# Patient Record
Sex: FEMALE | Race: WHITE | Employment: OTHER | URBAN - METROPOLITAN AREA
[De-identification: names, ages, dates, MRNs, and addresses within clinical notes are randomized per-mention and may not be internally consistent; named-entity substitution may affect disease eponyms.]

---

## 2020-08-16 ENCOUNTER — HOSPITAL ENCOUNTER (EMERGENCY)
Age: 85
Discharge: HOME | End: 2020-08-16
Attending: EMERGENCY MEDICINE
Payer: COMMERCIAL

## 2020-08-16 ENCOUNTER — APPOINTMENT (OUTPATIENT)
Dept: CT IMAGING | Age: 85
End: 2020-08-16
Payer: COMMERCIAL

## 2020-08-16 ENCOUNTER — APPOINTMENT (OUTPATIENT)
Dept: GENERAL RADIOLOGY | Age: 85
End: 2020-08-16
Payer: COMMERCIAL

## 2020-08-16 VITALS
HEART RATE: 80 BPM | WEIGHT: 138.4 LBS | DIASTOLIC BLOOD PRESSURE: 75 MMHG | SYSTOLIC BLOOD PRESSURE: 162 MMHG | RESPIRATION RATE: 18 BRPM | TEMPERATURE: 98.4 F | OXYGEN SATURATION: 94 %

## 2020-08-16 PROCEDURE — 70450 CT HEAD/BRAIN W/O DYE: CPT

## 2020-08-16 PROCEDURE — 73560 X-RAY EXAM OF KNEE 1 OR 2: CPT

## 2020-08-16 PROCEDURE — 72125 CT NECK SPINE W/O DYE: CPT

## 2020-08-16 PROCEDURE — 72128 CT CHEST SPINE W/O DYE: CPT

## 2020-08-16 PROCEDURE — 99284 EMERGENCY DEPT VISIT MOD MDM: CPT

## 2020-08-16 PROCEDURE — 73502 X-RAY EXAM HIP UNI 2-3 VIEWS: CPT

## 2020-08-16 ASSESSMENT — PAIN DESCRIPTION - PROGRESSION: CLINICAL_PROGRESSION: NOT CHANGED

## 2020-08-16 ASSESSMENT — PAIN SCALES - GENERAL: PAINLEVEL_OUTOF10: 5

## 2020-08-16 ASSESSMENT — PAIN DESCRIPTION - PAIN TYPE: TYPE: ACUTE PAIN

## 2020-08-16 ASSESSMENT — PAIN DESCRIPTION - FREQUENCY: FREQUENCY: CONTINUOUS

## 2020-08-16 ASSESSMENT — PAIN DESCRIPTION - LOCATION: LOCATION: HEAD

## 2020-08-16 ASSESSMENT — PAIN DESCRIPTION - ONSET: ONSET: GRADUAL

## 2020-08-16 ASSESSMENT — PAIN - FUNCTIONAL ASSESSMENT: PAIN_FUNCTIONAL_ASSESSMENT: ACTIVITIES ARE NOT PREVENTED

## 2020-08-16 NOTE — ED PROVIDER NOTES
[address] [facility] [place]      Pt Name: Matthew Segovia  MRN: [id number]  [place] 1935  Date of evaluation: 8/16/2020  Provider: Claudean Shell, MD    CHIEF COMPLAINT       Chief Complaint   Patient presents with   Salvador Filter Fall     pt presents to ED via [facility] from [facility] for fall today. states she started with L knee pain last night and today she was standing and her knee gave out causing her to fall. hit head, no LOC. on 81mg of ASA         HISTORY OF PRESENT ILLNESS   (Location/Symptom, Timing/Onset,Context/Setting, Quality, Duration, Modifying Factors, Severity)  Note limiting factors. Matthew Segovia is a 82 y.o. female who presents to the emergency department after a fall today. Patient reports that she was standing at her dresser and was trying to open a drawer, when she lost her balance, which she states she does frequently due to her Parkinson's disease, and was unable to stop herself from falling backwards. She states she hit her upper back and her head on the ground. She denies dizziness, chest pain, palpitations before or after the fall. She not lose consciousness. She takes a baby aspirin daily, no other blood thinners. Denies any visual changes, focal numbness, tingling, or weakness but does endorse headache. Patient is also complaining of left hip and left knee pain. She states that the left knee has known arthritis and she had worsening pain throughout the day yesterday. She states that she did not have hip pain prior to the fall. She is able to range the hip and the knee, but endorses pain with this. She denies any chest pain, abdominal pain, other extremity pain. Otherwise has been feeling well recently has no other complaints at the time of my evaluation. NursingNotes were reviewed. REVIEW OF SYSTEMS    (2-9 systems for level 4, 10 or more for level 5)       Constitutional: No fever or chills.   Eye: No visual disturbances. No eye pain. Ear/Nose/Mouth/Throat: No nasal congestion. No sore throat. Respiratory: No cough, No shortness of breath, No sputum production. Cardiovascular: No chest pain. No palpitations. Gastrointestinal: No abdominal pain. No nausea or vomiting  Genitourinary: No dysuria. No hematuria. Hematology/Lymphatics: No bleeding or bruising tendency. Immunologic: No malaise. No swollen glands. Musculoskeletal: Upper back pain. Left hip and knee pain. Integumentary: No rash. No abrasions. Neurologic: Headache. No focal numbness or weakness. PAST MEDICAL HISTORY     Past Medical History:   Diagnosis Date    High blood pressure     Parkinson disease ([facility] [address]          SURGICALHISTORY       Past Surgical History:   Procedure Laterality Date    APPENDECTOMY  1948    DILATION AND CURETTAGE OF UTERUS  1959 58 64 57    HYSTERECTOMY  1974    TONSILLECTOMY AND ADENOIDECTOMY  1943    VAGINA SURGERY  2006    repair vagina bladder and rectum         CURRENT MEDICATIONS       Discharge Medication List as of 8/16/2020  8:29 PM      CONTINUE these medications which have NOT CHANGED    Details   verapamil (CALAN-SR) 240 MG CR tablet Take 240 mg by mouth nightly. Historical Med             ALLERGIES     Estrogens conjugated; Penicillins; and Terramycin [oxytetracycline-lidocaine]    FAMILY HISTORY     History reviewed. No pertinent family history.        SOCIAL HISTORY       Social History     Socioeconomic History    Marital status:      Spouse name: None    Number of children: None    Years of education: None    Highest education level: None   Occupational History    None   Social Needs    Financial resource strain: None    Food insecurity     Worry: None     Inability: None    Transportation needs     Medical: None     Non-medical: None   Tobacco Use    Smoking status: Never Smoker   Substance and Sexual Activity    Alcohol use: Yes     Comment: occasional    Drug use: No    Sexual activity: None   Lifestyle    Physical activity     Days per week: None     Minutes per session: None    Stress: None   Relationships    Social connections     Talks on phone: None     Gets together: None     Attends Mosque service: None     Active member of club or organization: None     Attends meetings of clubs or organizations: None     Relationship status: None    Intimate partner violence     Fear of current or ex partner: None     Emotionally abused: None     Physically abused: None     Forced sexual activity: None   Other Topics Concern    None   Social History Narrative    None       SCREENINGS             PHYSICAL EXAM    (up to 7 for level 4, 8 or more for level 5)     ED Triage Vitals [08/16/20 1720]   BP Temp Temp Source Pulse Resp SpO2 Height Weight   (!) 146/70 98.4 °F (36.9 °C) Oral 80 18 94 % -- 138 lb 6.4 oz (62.8 kg)       General: Alert and oriented, No acute distress. Eye: Normal conjunctiva. Pupils equal and reactive. HENT: Oral mucosa is moist.  Occipital scalp hematoma, no lacerations. No facial bruising or abrasions. Respiratory: Lungs are clear to auscultation, Respirations are non-labored. Cardiovascular: Normal rate, Regular rhythm. No chest wall tenderness. Gastrointestinal: Soft, Non-tender, Non-distended. Musculoskeletal: Tenderness laterally over the left hip, as well as the left knee. No deformity of either joint, pain with range of motion of both joints. No other extremity tenderness, deformity, or swelling. No cervical or lumbar spinal tenderness to palpation. Patient does have mid to upper thoracic spinal tenderness. No step-off. Integumentary: Warm, Dry. No other bruising or abrasions noted. Neurologic: Alert, Oriented, No focal defects. Normal speech and cranial nerve exam.  Motor and sensation intact in all extremities. Psychiatric: Cooperative.     DIAGNOSTIC RESULTS     EKG: All EKG's are interpreted by the Emergency Department Physician who thoracic spine tenderness, no cervical spine tenderness. Normal neurologic exam.  There is a scalp hematoma to the occipital scalp. CT of the head, cervical, and thoracic spines were obtained and negative for acute traumatic injury. X-rays of the left knee show arthritic changes, but no acute osseous abnormality. X-rays of the left hip are negative. Patient was counseled regarding all findings. Recommended to take Tylenol and Motrin as needed for headache, and to follow-up closely with her PCP for possible mild concussion. Her and her son are agreeable with plan of care and she is discharged her nursing facility in stable condition. CRITICAL CARE TIME   Total Critical Care time was 0 minutes, excluding separately reportable procedures. There was a high probability of clinically significant/life threatening deterioration in the patient's condition which required my urgent intervention. CONSULTS:  None    PROCEDURES:  Unless otherwise noted below, none         FINAL IMPRESSION      1. Closed head injury, initial encounter    2.  Hematoma of scalp, initial encounter          DISPOSITION/PLAN   DISPOSITION Decision To Discharge 08/16/2020 08:05:06 PM      PATIENT REFERRED TO:  Your PCP    In 1 day        DISCHARGE MEDICATIONS:  Discharge Medication List as of 8/16/2020  8:29 PM             (Please note that portions of this note were completed with a voice recognition program.Efforts were made to edit the dictations but occasionally words are mis-transcribed.)    Simona Cadet MD (electronically signed)  Attending Emergency Physician        Simona Cadet MD  08/16/20 [id number]

## 2020-08-16 NOTE — ED NOTES
Bed: B-05  Expected date: 8/16/20  Expected time: 5:13 PM  Means of arrival:   Comments:  [place] fall/head injury/loss balance d/t knee     Manolo Colvin RN  08/16/20 2813

## 2020-08-16 NOTE — ED TRIAGE NOTES
pt presents to ED via [facility] from [address] for fall today. states she started with L knee pain last night and today she was standing and her knee gave out causing her to fall. hit head, no LOC. on 81mg of ASA. States a month ago she fell and she thinks that is how she hurt her knee. Mild edema noted. Skin warm to touch with distal pulse intact. Sensation intact    Fall risk assessment completed every shift. All precautions in place. Pt has call light within reach at all times. Room clear of clutter. Pt aware to call for assistance when getting up.

## 2020-08-17 NOTE — ED NOTES
D/C: Order noted for d/c. Pt confirmed d/c paperwork does have correct name. Discharge and education instructions reviewed with patient. Teach-back successful. Pt verbalized understanding and signed d/c papers. Pt denied questions at this time. No acute distress noted. Patient instructed to follow-up as noted - return to emergency department if symptoms worsen. Patient verbalized understanding. Discharged per EDMD with discharge instructions. Pt [facility]. Patient stable upon departure. Thanked patient for choosing [facility] for care.       Neel Rodriguez RN  08/16/20 [id number]

## 2020-12-01 RX ORDER — LISINOPRIL 5 MG/1
5 TABLET ORAL DAILY
COMMUNITY

## 2020-12-01 RX ORDER — MULTIVITAMIN
1 TABLET ORAL DAILY
Status: ON HOLD | COMMUNITY
End: 2021-02-26 | Stop reason: ALTCHOICE

## 2020-12-01 RX ORDER — MULTIVIT WITH MINERALS/LUTEIN
1000 TABLET ORAL
COMMUNITY

## 2020-12-01 RX ORDER — POLYETHYLENE GLYCOL 3350 17 G/17G
17 POWDER, FOR SOLUTION ORAL DAILY PRN
COMMUNITY

## 2020-12-01 RX ORDER — BUSPIRONE HYDROCHLORIDE 5 MG/1
7.5 TABLET ORAL NIGHTLY
Status: ON HOLD | COMMUNITY
End: 2021-02-26 | Stop reason: ALTCHOICE

## 2020-12-01 RX ORDER — FAMOTIDINE 20 MG/1
20 TABLET, FILM COATED ORAL 2 TIMES DAILY
Status: ON HOLD | COMMUNITY
End: 2021-03-05 | Stop reason: HOSPADM

## 2020-12-01 RX ORDER — CARBOXYMETHYLCELLULOSE SODIUM 5 MG/ML
1 SOLUTION/ DROPS OPHTHALMIC 4 TIMES DAILY PRN
COMMUNITY

## 2020-12-01 RX ORDER — PRAVASTATIN SODIUM 20 MG
20 TABLET ORAL NIGHTLY
COMMUNITY
Start: 2019-02-11

## 2020-12-01 RX ORDER — ASPIRIN 81 MG/1
81 TABLET ORAL DAILY
COMMUNITY

## 2020-12-01 RX ORDER — ACETAMINOPHEN 325 MG/1
650 TABLET ORAL EVERY 6 HOURS PRN
COMMUNITY

## 2020-12-01 RX ORDER — ALENDRONATE SODIUM 70 MG/1
70 TABLET ORAL
COMMUNITY
Start: 2019-03-12

## 2020-12-01 NOTE — PROGRESS NOTES
Preoperative Screening for Elective Surgery/Invasive Procedures While COVID-19 present in the community     Have you tested positive or have been told to self-isolate for COVID-19 like symptoms within the past 28 days? no   Do you currently have any of the following symptoms?no  o Fever >100.0 F or 99.9 F in immunocompromised patients? o New onset cough, shortness of breath or difficulty breathing?  o New onset sore throat, myalgia (muscle aches and pains), headache, loss of taste/smell or diarrhea?  Have you had a potential exposure to COVID-19 within the past 14 days by:no  o Close contact with a confirmed case?no  o Close contact with a healthcare worker,  or essential infrastructure worker (grocery store, [employer], gas station, public utilities or transportation)?no  o Do you reside in a congregate setting such as; skilled nursing facility, adult home, correctional facility, homeless shelter or other institutional setting?no  o Have you had recent travel to a known COVID-19 hotspot? no    Indicate if the patient has a positive screen by answering yes to one or more of the above questions. Patients who test positive or screen positive prior to surgery or on the day of surgery should be evaluated in conjunction with the surgeon/proceduralist/anesthesiologist to determine the urgency of the procedure. Remember. Caroline Langston Safety First! Call before you Fall

## 2020-12-02 ENCOUNTER — ANESTHESIA EVENT (OUTPATIENT)
Dept: OPERATING ROOM | Age: 85
End: 2020-12-02
Payer: COMMERCIAL

## 2020-12-02 NOTE — PROGRESS NOTES
Pre-operative instructions faxed with confirmation, to nursing home and [facility] aware .

## 2020-12-02 NOTE — PROGRESS NOTES
[facility] time___1230_________        Surgery time___1440_________    Take the following medications with a sip of water: Follow your MD/Surgeons pre-procedure instructions regarding your medications    Do not eat or drink anything after 12:00 midnight prior to your surgery. This includes water chewing gum, mints and ice chips. You may brush your teeth and gargle the morning of your surgery, but do not swallow the water     Please see your family doctor/pediatrician for a history and physical and/or concerning medications. Bring any test results/reports from your physicians office. If you are under the care of a heart doctor or specialist doctor, please be aware that you may be asked to them for clearance    You may be asked to stop blood thinners such as Coumadin, Plavix, Fragmin, Lovenox, etc., or any anti-inflammatories such as:  Aspirin, Ibuprofen, Advil, Naproxen prior to your surgery. We also ask that you stop any OTC medications such as fish oil, vitamin E, glucosamine, garlic, Multivitamins, COQ 10, etc.    We ask that you do not smoke 24 hours prior to surgery  We ask that you do not  drink any alcoholic beverages 24 hours prior to surgery     You must make arrangements for a responsible adult to take you home after your surgery. For your safety you will not be allowed to leave alone or drive yourself home. Your surgery will be cancelled if you do not have a ride home. Also for your safety, it is strongly suggested that someone stay with you the first 24 hours after your surgery. A parent or legal guardian must accompany a child scheduled for surgery and plan to stay at the hospital until the child is discharged. Please do not bring other children with you. For your comfort, please wear simple loose fitting clothing to the hospital.  Please do not bring valuables.     Do not wear any make-up or nail polish on your fingers or toes      For your safety, please do not wear any jewelry or body piercing's on the day of surgery. All jewelry must be removed. If you have dentures, they will be removed before going to operating room. For your convenience, we will provide you with a container. If you wear contact lenses or glasses, they will be removed, please bring a case for them. If you have a living will and a durable power of  for healthcare, please bring in a copy. As part of our patient safety program to minimize surgical site infections, we ask you to do the following:    · Please notify your surgeon if you develop any illness between         now and the  day of your surgery. · This includes a cough, cold, fever, sore throat, nausea,         or vomiting, and diarrhea, etc.  ·  Please notify your surgeon if you experience dizziness, shortness         of breath or blurred vision between now and the time of your surgery. Do not shave your operative site 96 hours prior to surgery. For face and neck surgery, men may use an electric razor 48 hours   prior to surgery. You may shower the night before surgery or the morning of   your surgery with an antibacterial soap. You will need to bring a photo ID and insurance card    [facility] has an onsite pharmacy, would you like to utilize our pharmacy     If you will be staying overnight and use a C-pap machine, please bring   your C-pap to hospital     Our goal is to provide you with excellent care, therefore, visitors will be limited to two(2) in the room at a time so that we may focus on providing this care for you. Please contact pre-admission testing if you have any further questions. [facility] phone number:  [phone] PAT fax number:  [phone]  Please note these are generalized instructions for all surgical cases, you may be provided with more specific instructions according to your surgery.

## 2020-12-03 ENCOUNTER — ANESTHESIA (OUTPATIENT)
Dept: OPERATING ROOM | Age: 85
End: 2020-12-03
Payer: COMMERCIAL

## 2020-12-03 ENCOUNTER — HOSPITAL ENCOUNTER (OUTPATIENT)
Age: 85
Setting detail: OUTPATIENT SURGERY
Discharge: HOME | End: 2020-12-03
Attending: UROLOGY | Admitting: UROLOGY
Payer: COMMERCIAL

## 2020-12-03 VITALS
TEMPERATURE: 97.3 F | DIASTOLIC BLOOD PRESSURE: 78 MMHG | RESPIRATION RATE: 16 BRPM | HEART RATE: 76 BPM | BODY MASS INDEX: 26.73 KG/M2 | SYSTOLIC BLOOD PRESSURE: 152 MMHG | WEIGHT: 132.6 LBS | HEIGHT: 59 IN | OXYGEN SATURATION: 97 %

## 2020-12-03 VITALS
OXYGEN SATURATION: 100 % | RESPIRATION RATE: 15 BRPM | SYSTOLIC BLOOD PRESSURE: 125 MMHG | DIASTOLIC BLOOD PRESSURE: 62 MMHG | TEMPERATURE: 98.6 F

## 2020-12-03 PROCEDURE — 6360000002 HC RX W HCPCS: Performed by: NURSE ANESTHETIST, CERTIFIED REGISTERED

## 2020-12-03 PROCEDURE — 7100000001 HC PACU RECOVERY - ADDTL 15 MIN: Performed by: UROLOGY

## 2020-12-03 PROCEDURE — 7100000011 HC PHASE II RECOVERY - ADDTL 15 MIN: Performed by: UROLOGY

## 2020-12-03 PROCEDURE — 2500000003 HC RX 250 WO HCPCS: Performed by: NURSE ANESTHETIST, CERTIFIED REGISTERED

## 2020-12-03 PROCEDURE — 3700000001 HC ADD 15 MINUTES (ANESTHESIA): Performed by: UROLOGY

## 2020-12-03 PROCEDURE — 3600000002 HC SURGERY LEVEL 2 BASE: Performed by: UROLOGY

## 2020-12-03 PROCEDURE — 2709999900 HC NON-CHARGEABLE SUPPLY: Performed by: UROLOGY

## 2020-12-03 PROCEDURE — 7100000010 HC PHASE II RECOVERY - FIRST 15 MIN: Performed by: UROLOGY

## 2020-12-03 PROCEDURE — 3600000012 HC SURGERY LEVEL 2 ADDTL 15MIN: Performed by: UROLOGY

## 2020-12-03 PROCEDURE — 3700000000 HC ANESTHESIA ATTENDED CARE: Performed by: UROLOGY

## 2020-12-03 PROCEDURE — 7100000000 HC PACU RECOVERY - FIRST 15 MIN: Performed by: UROLOGY

## 2020-12-03 PROCEDURE — 6360000002 HC RX W HCPCS: Performed by: UROLOGY

## 2020-12-03 PROCEDURE — 2580000003 HC RX 258: Performed by: ANESTHESIOLOGY

## 2020-12-03 RX ORDER — PROMETHAZINE HYDROCHLORIDE 25 MG/ML
6.25 INJECTION, SOLUTION INTRAMUSCULAR; INTRAVENOUS
Status: DISCONTINUED | OUTPATIENT
Start: 2020-12-03 | End: 2020-12-03 | Stop reason: HOSPADM

## 2020-12-03 RX ORDER — MEPERIDINE HYDROCHLORIDE 25 MG/ML
12.5 INJECTION INTRAMUSCULAR; INTRAVENOUS; SUBCUTANEOUS
Status: DISCONTINUED | OUTPATIENT
Start: 2020-12-03 | End: 2020-12-03 | Stop reason: HOSPADM

## 2020-12-03 RX ORDER — SODIUM CHLORIDE 0.9 % (FLUSH) 0.9 %
10 SYRINGE (ML) INJECTION PRN
Status: DISCONTINUED | OUTPATIENT
Start: 2020-12-03 | End: 2020-12-03 | Stop reason: HOSPADM

## 2020-12-03 RX ORDER — HYDROCODONE BITARTRATE AND ACETAMINOPHEN 5; 325 MG/1; MG/1
2 TABLET ORAL PRN
Status: DISCONTINUED | OUTPATIENT
Start: 2020-12-03 | End: 2020-12-03 | Stop reason: HOSPADM

## 2020-12-03 RX ORDER — PROPOFOL 10 MG/ML
INJECTION, EMULSION INTRAVENOUS PRN
Status: DISCONTINUED | OUTPATIENT
Start: 2020-12-03 | End: 2020-12-03 | Stop reason: SDUPTHER

## 2020-12-03 RX ORDER — SODIUM CHLORIDE 9 MG/ML
INJECTION, SOLUTION INTRAVENOUS CONTINUOUS
Status: DISCONTINUED | OUTPATIENT
Start: 2020-12-03 | End: 2020-12-03 | Stop reason: HOSPADM

## 2020-12-03 RX ORDER — CIPROFLOXACIN 2 MG/ML
400 INJECTION, SOLUTION INTRAVENOUS ONCE
Status: COMPLETED | OUTPATIENT
Start: 2020-12-03 | End: 2020-12-03

## 2020-12-03 RX ORDER — GLYCOPYRROLATE 0.2 MG/ML
INJECTION INTRAMUSCULAR; INTRAVENOUS PRN
Status: DISCONTINUED | OUTPATIENT
Start: 2020-12-03 | End: 2020-12-03 | Stop reason: SDUPTHER

## 2020-12-03 RX ORDER — DEXAMETHASONE SODIUM PHOSPHATE 4 MG/ML
INJECTION, SOLUTION INTRA-ARTICULAR; INTRALESIONAL; INTRAMUSCULAR; INTRAVENOUS; SOFT TISSUE PRN
Status: DISCONTINUED | OUTPATIENT
Start: 2020-12-03 | End: 2020-12-03 | Stop reason: SDUPTHER

## 2020-12-03 RX ORDER — HYDRALAZINE HYDROCHLORIDE 20 MG/ML
5 INJECTION INTRAMUSCULAR; INTRAVENOUS EVERY 10 MIN PRN
Status: DISCONTINUED | OUTPATIENT
Start: 2020-12-03 | End: 2020-12-03 | Stop reason: HOSPADM

## 2020-12-03 RX ORDER — SODIUM CHLORIDE 0.9 % (FLUSH) 0.9 %
10 SYRINGE (ML) INJECTION EVERY 12 HOURS SCHEDULED
Status: DISCONTINUED | OUTPATIENT
Start: 2020-12-03 | End: 2020-12-03 | Stop reason: HOSPADM

## 2020-12-03 RX ORDER — ONDANSETRON 2 MG/ML
4 INJECTION INTRAMUSCULAR; INTRAVENOUS
Status: DISCONTINUED | OUTPATIENT
Start: 2020-12-03 | End: 2020-12-03 | Stop reason: HOSPADM

## 2020-12-03 RX ORDER — FENTANYL CITRATE 50 UG/ML
50 INJECTION, SOLUTION INTRAMUSCULAR; INTRAVENOUS EVERY 5 MIN PRN
Status: DISCONTINUED | OUTPATIENT
Start: 2020-12-03 | End: 2020-12-03 | Stop reason: HOSPADM

## 2020-12-03 RX ORDER — FENTANYL CITRATE 50 UG/ML
25 INJECTION, SOLUTION INTRAMUSCULAR; INTRAVENOUS EVERY 5 MIN PRN
Status: DISCONTINUED | OUTPATIENT
Start: 2020-12-03 | End: 2020-12-03 | Stop reason: HOSPADM

## 2020-12-03 RX ORDER — ONDANSETRON 2 MG/ML
INJECTION INTRAMUSCULAR; INTRAVENOUS PRN
Status: DISCONTINUED | OUTPATIENT
Start: 2020-12-03 | End: 2020-12-03 | Stop reason: SDUPTHER

## 2020-12-03 RX ORDER — HYDROCODONE BITARTRATE AND ACETAMINOPHEN 5; 325 MG/1; MG/1
1 TABLET ORAL PRN
Status: DISCONTINUED | OUTPATIENT
Start: 2020-12-03 | End: 2020-12-03 | Stop reason: HOSPADM

## 2020-12-03 RX ADMIN — PROPOFOL 50 MG: 10 INJECTION, EMULSION INTRAVENOUS at 15:05

## 2020-12-03 RX ADMIN — PROPOFOL 30 MG: 10 INJECTION, EMULSION INTRAVENOUS at 15:09

## 2020-12-03 RX ADMIN — GLYCOPYRROLATE 0.2 MG: 0.2 INJECTION, SOLUTION INTRAMUSCULAR; INTRAVENOUS at 15:05

## 2020-12-03 RX ADMIN — CIPROFLOXACIN 400 MG: 2 INJECTION, SOLUTION INTRAVENOUS at 13:09

## 2020-12-03 RX ADMIN — PROPOFOL 20 MG: 10 INJECTION, EMULSION INTRAVENOUS at 15:18

## 2020-12-03 RX ADMIN — PROPOFOL 30 MG: 10 INJECTION, EMULSION INTRAVENOUS at 15:08

## 2020-12-03 RX ADMIN — PROPOFOL 70 MG: 10 INJECTION, EMULSION INTRAVENOUS at 15:02

## 2020-12-03 RX ADMIN — ONDANSETRON 4 MG: 2 INJECTION INTRAMUSCULAR; INTRAVENOUS at 15:05

## 2020-12-03 RX ADMIN — PROPOFOL 30 MG: 10 INJECTION, EMULSION INTRAVENOUS at 15:16

## 2020-12-03 RX ADMIN — SODIUM CHLORIDE: 9 INJECTION, SOLUTION INTRAVENOUS at 13:09

## 2020-12-03 RX ADMIN — DEXAMETHASONE SODIUM PHOSPHATE 8 MG: 4 INJECTION, SOLUTION INTRAMUSCULAR; INTRAVENOUS at 15:05

## 2020-12-03 RX ADMIN — PROPOFOL 50 MG: 10 INJECTION, EMULSION INTRAVENOUS at 15:13

## 2020-12-03 ASSESSMENT — PULMONARY FUNCTION TESTS
PIF_VALUE: 0
PIF_VALUE: 1
PIF_VALUE: 0

## 2020-12-03 ASSESSMENT — PAIN - FUNCTIONAL ASSESSMENT: PAIN_FUNCTIONAL_ASSESSMENT: 0-10

## 2020-12-03 ASSESSMENT — PAIN SCALES - GENERAL
PAINLEVEL_OUTOF10: 0

## 2020-12-03 ASSESSMENT — LIFESTYLE VARIABLES: SMOKING_STATUS: 0

## 2020-12-03 NOTE — DISCHARGE INSTR - ACTIVITY
Learning How To Care for Someone Who Has COVID-19  Things to know  Most people who get COVID-19 will recover with time and home care. Here are some things to know if you're caring for someone who's sick. · Treat the symptoms. Common symptoms include a fever, coughing, and feeling short of breath. Urge the person to get extra rest and drink plenty of fluids to replace fluids lost from fever. To reduce a fever, offer acetaminophen (such as Tylenol). It may also help with muscle aches. Read and follow all instructions on the label. · Watch for signs that the illness is getting worse. The person may need medical care if they're getting sicker (for example, if it's hard to breathe). But call the doctor's office before you go. They can tell you what to do. Call 911 or emergency services if the person has any of these symptoms:  ? Severe trouble breathing or shortness of breath. ? Constant pain or pressure in their chest.  ? Confusion, or trouble thinking clearly. ? A blue tint to their lips or face. Some people are more likely to get very sick and need medical care. Call the doctor as soon as symptoms start if the person you're caring for is over 72, smokes, or has a serious health problem, like asthma, heart disease, diabetes, or an immune system problem. · Protect yourself and others. The virus spreads easily from person to person, so take extra care to avoid catching or spreading the infection. ? Keep the sick person away from others as much as you can. § Have the person stay in one room. If you can, give them their own bathroom to use. § Have only one person take care of them. Keep other people--and pets--out of the sickroom. § Have the person wear a cloth face cover around other people. This includes when anyone is in the room with them or if they leave their room (for example, to go to the bathroom).  If the face cover makes it harder for the sick person to breathe, the other person should wear a face cover. § Don't share personal items. These include dishes, cups, towels, and bedding. ? Wash your hands often and well. Use soap and water, and scrub for at least 20 seconds. This is especially important after you've been around the sick person or touched things they've touched. If soap and water aren't handy, use a hand  with at least 60% alcohol. ? Avoid touching your mouth, nose, and eyes. ? Take care with the person's laundry. It's okay to wash the sick person's laundry with yours. If you have them, wear disposable gloves when handling their dirty laundry, and wash your hands well after you touch it. Wash items in the warmest water allowed for the fabric type, and dry them completely. ? Clean high-touch items every day and anytime the sick person touches them. These include doorknobs, light switches, toilets, counters, and remote controls. Use a household disinfectant or a homemade bleach solution. (Follow the directions on the label.) If the sick person has their own room, have them disinfect it every day. ? Limit visitors to your home. To help protect family and friends, stay in touch with them only by phone or computer. Current as of: July 10, 2020               Content Version: 12.6  © 2991-0782 [employer]. Care instructions adapted under license by [facility] ([facility]). If you have questions about a medical condition or this instruction, always ask your healthcare professional. [place] [address] any warranty or liability for your use of this information.

## 2020-12-03 NOTE — H&P
Update History & Physical    The patient's History and Physical was reviewed with the patient and I examined the patient. There was no change. The surgical site was confirmed by the patient and me. Plan: The risks, benefits, expected outcome, and alternative to the recommended procedure have been discussed with the patient. Patient understands and wants to proceed with the procedure.      Electronically signed by Lorene Perry MD on 12/3/2020 at 2:50 PM

## 2020-12-03 NOTE — PROGRESS NOTES
Patient admitted to PACU 13. Following commands. Denies pain. Resting comfortably. HD's stable and ventilation adequate.

## 2020-12-03 NOTE — ANESTHESIA PRE PROCEDURE
[facility] Department of Anesthesiology  Pre-Anesthesia Evaluation/Consultation       Name:  Luis Lee  : 1935  Age:  82 y.o. MRN:  [id number]  Date: 12/3/2020           Surgeon: Surgeon(s):  Asya Retana MD    Procedure: Procedure(s):  CYSTOSCOPY,  WITH INTRAVESICAL INJECTION OF BOTOX 200UNITS, WITH SUPRAPUBIC TUBE EXCHANGE     Allergies   Allergen Reactions    Estrogens Conjugated Other (See Comments)     lightheaded    Penicillins Rash    Terramycin [Oxytetracycline-Lidocaine] Rash     There is no problem list on file for this patient. Past Medical History:   Diagnosis Date    High blood pressure     Parkinson disease ([address]     Suprapubic catheter ([address]      Past Surgical History:   Procedure Laterality Date    ABDOMEN SURGERY      bowel obstruction    APPENDECTOMY      DILATION AND CURETTAGE OF UTERUS   60 62 64    HYSTERECTOMY  1974    TONSILLECTOMY AND ADENOIDECTOMY      VAGINA SURGERY  2006    repair vagina bladder and rectum     Social History     Tobacco Use    Smoking status: Never Smoker    Smokeless tobacco: Never Used   Substance Use Topics    Alcohol use: Yes     Comment: occasional    Drug use: No     Medications  No current facility-administered medications on file prior to encounter.       Current Outpatient Medications on File Prior to Encounter   Medication Sig Dispense Refill    lisinopril (PRINIVIL;ZESTRIL) 5 MG tablet Take 5 mg by mouth daily      carbidopa-levodopa (SINEMET)  MG per tablet Take 1 tablet by mouth 6 times daily Takes 50mg ER at bedtime      pravastatin (PRAVACHOL) 20 MG tablet Take 20 mg by mouth daily      alendronate (FOSAMAX) 70 MG tablet Take 70 mg by mouth every 7 days      famotidine (PEPCID) 20 MG tablet Take 20 mg by mouth 2 times daily      busPIRone (BUSPAR) 5 MG tablet Take 7.5 mg by mouth nightly      sertraline (ZOLOFT) 50 MG tablet Take 50 mg by mouth daily      vitamin D (CHOLECALCIFEROL) 25 MCG (1000 UT) TABS tablet Take 1,000 Units by mouth daily      vitamin C (ASCORBIC ACID) 500 MG tablet Take 1,000 mg by mouth Daily with lunch      polyethylene glycol (GLYCOLAX) 17 g packet Take 17 g by mouth daily      acetaminophen (TYLENOL) 325 MG tablet Take 650 mg by mouth every 6 hours as needed for Pain      carboxymethylcellulose 1 % ophthalmic solution 1 drop as needed for Dry Eyes      verapamil (CALAN-SR) 240 MG CR tablet Take 360 mg by mouth daily       Cranberry 1000 MG CAPS Take by mouth daily      Multiple Vitamin (MULTIVITAMIN) tablet Take 1 tablet by mouth daily      Probiotic Product (PROBIOTIC-10 PO) Take 1 tablet by mouth daily      MAGNESIUM PO Take 1 tablet by mouth Daily with supper      aspirin (ASPIRIN 81) 81 MG EC tablet daily Stopped for surgery       Current Facility-Administered Medications   Medication Dose Route Frequency Provider Last Rate Last Dose    ciprofloxacin (CIPRO) IVPB 400 mg  400 mg Intravenous Once Amarjit Aburto MD        0.9 % sodium chloride infusion   Intravenous Continuous Allyson Abdalla MD        sodium chloride flush 0.9 % injection 10 mL  10 mL Intravenous 2 times per day Allyson Abdalla MD        sodium chloride flush 0.9 % injection 10 mL  10 mL Intravenous PRN Allyson Abdalla MD         Vital Signs (Current)   Vitals:    12/01/20 1630   Weight: 136 lb (61.7 kg)   Height: 4' 11\" (1.499 m)                                          BP Readings from Last 3 Encounters:   08/16/20 (!) 162/75     Vital Signs Statistics (for past 48 hrs)     No data recorded  BP Readings from Last 3 Encounters:   08/16/20 (!) 162/75       BMI  Body mass index is 27.47 kg/m². Estimated body mass index is 27.47 kg/m² as calculated from the following:    Height as of this encounter: 4' 11\" (1.499 m). Weight as of this encounter: 136 lb (61.7 kg).     CBC No results found for: WBC, RBC, HGB, HCT, MCV, RDW, PLT  CMP  No results found for: NA, K, CL, CO2, BUN, CREATININE, GFRAA, AGRATIO, LABGLOM, GLUCOSE, PROT, CALCIUM, BILITOT, ALKPHOS, AST, ALT  BMP  No results found for: NA, K, CL, CO2, BUN, CREATININE, CALCIUM, GFRAA, LABGLOM, GLUCOSE  POCGlucose  No results for input(s): GLUCOSE in the last 72 hours. Coags  No results found for: PROTIME, INR, APTT  HCG (If Applicable) No results found for: PREGTESTUR, PREGSERUM, HCG, HCGQUANT   ABGs No results found for: PHART, PO2ART, IKH4NLU, MSL4PZJ, BEART, E4NWQLWY   Type & Screen (If Applicable)  No results found for: LABABO, LABRH                         BMI: Wt Readings from Last 3 Encounters:       NPO Status:  >8h                          Anesthesia Evaluation  Patient summary reviewed no history of anesthetic complications:   Airway: Mallampati: II  TM distance: >3 FB   Neck ROM: full  Mouth opening: > = 3 FB Dental: normal exam         Pulmonary: breath sounds clear to auscultation      (-) COPD, asthma, sleep apnea and not a current smoker                           Cardiovascular:    (+) hypertension:,     (-) past MI and CABG/stent      Rhythm: regular  Rate: normal                    Neuro/Psych:   (+) neuromuscular disease: Parkinson's disease,    (-) seizures, TIA and CVA           GI/Hepatic/Renal:        (-) GERD       Endo/Other:        (-) diabetes mellitus, hypothyroidism               Abdominal:           Vascular:     - DVT and PE. Anesthesia Plan      MAC     ASA 3       Induction: intravenous. Anesthetic plan and risks discussed with patient. Plan discussed with CRNA. This pre-anesthesia assessment may be used as a history and physical.    DOS STAFF ADDENDUM:    Pt seen and examined, chart reviewed (including anesthesia, drug and allergy history). No interval changes to history and physical examination.   Anesthetic plan, risks, benefits, alternatives, and personnel involved discussed with patient. Patient verbalized an understanding and agrees to proceed.       Alexandra Alex MD  December 3, 2020  12:52 PM

## 2020-12-03 NOTE — PROGRESS NOTES
Pt alert on arrival to phase II. Denies pain at present. Urge to void. Pt states \"I think I am incontinent of urine. \" VSS. IV d/c'd. Assisted pt to side of stretcher, cleaned pt, placed pull up and assisted pt to dress. Pt is sitting up in chair. Given juice and crackers. Son to room.

## 2020-12-03 NOTE — PROGRESS NOTES
Pt tolerates PO and sitting up in chair. Son present. Both state that there is no need to call report to [facility] since pt is in assisted living and takes care of herself. Son left to get the car. Wheeled pt to son's car.

## 2020-12-04 NOTE — OP NOTE
[facility]           [address]                                OPERATIVE REPORT    PATIENT NAME: Zacarias Romo                   :        1935  MED REC NO:   [id number]                          ROOM:  ACCOUNT NO:   [de-identified]                           ADMIT DATE: 2020  PROVIDER:     Karl Hilario MD    DATE OF PROCEDURE:  2020    PREOPERATIVE DIAGNOSES:  Neurogenic bladder, urgency incontinence. POSTOPERATIVE DIAGNOSES:  Neurogenic bladder, urgency incontinence. OPERATION PERFORMED:  Suprapubic catheter exchange, cystoscopy,  intravesical injection of Botox 200 units. SURGEON:  Karl Hilario MD    ANESTHESIA:  TIVA. COMPLICATIONS:  None. BLOOD LOSS:  Minimal.    INDICATIONS:  An 61-year-old female with a history of Parkinson's  disease leading to neurogenic bladder. She has a hyperactive, hypotonic  bladder. She is managed with a suprapubic catheter and Botox  injections. She is here for her regular Botox injection. OPERATIVE PROCEDURE:  The preoperative urine culture was negative. She  was given Cipro. She was taken to the operative suite. She was moved  onto the table. Anesthesia was induced. Her indwelling suprapubic  catheter was removed. A new catheter of 16-Cuban was inserted and the  balloon was filled with 10 mL of sterile water. The catheter was  clamped, and then the genitalia were prepped and draped. The 21-Cuban cystoscope advanced easily through the urethra into the  bladder. The bladder showed no specific abnormalities. Both ureteral  orifices were normal.  The suprapubic catheter was seen at the dome. 200 units of Botox were reconstituted in 20 mL of injectable saline. The injection needle was inserted and flushed. The Botox was then  injected in 1 mL increments throughout the bladder.   A total of 21 injections were given with the final being flushed. The bladder was then drained, and the scope was withdrawn. The clamp  was taken off the suprapubic catheter and the catheter plug was placed. The patient was awakened and transferred to recovery having tolerated  the procedure well. She will follow up in the office in two weeks.         Graciela Fraser MD    D: 12/03/2020 15:36:22       T: 12/03/2020 15:44:57     [id number]  Job#: [id number]     Doc#: [id number]    CC:

## 2020-12-13 ENCOUNTER — APPOINTMENT (OUTPATIENT)
Dept: CT IMAGING | Age: 85
DRG: 300 | End: 2020-12-13
Payer: COMMERCIAL

## 2020-12-13 ENCOUNTER — HOSPITAL ENCOUNTER (INPATIENT)
Age: 85
LOS: 4 days | Discharge: NURSING, REHAB OR LONG-TERM CARE | DRG: 300 | End: 2020-12-17
Attending: EMERGENCY MEDICINE | Admitting: INTERNAL MEDICINE
Payer: COMMERCIAL

## 2020-12-13 ENCOUNTER — APPOINTMENT (OUTPATIENT)
Dept: GENERAL RADIOLOGY | Age: 85
DRG: 300 | End: 2020-12-13
Payer: COMMERCIAL

## 2020-12-13 PROBLEM — I26.99 PULMONARY EMBOLISM WITHOUT ACUTE COR PULMONALE (HCC): Status: ACTIVE | Noted: 2020-12-13

## 2020-12-13 LAB
A/G RATIO: 1.1 (ref 1.1–2.2)
A/G RATIO: 1.4 (ref 1.1–2.2)
ALBUMIN SERPL-MCNC: 3.3 G/DL (ref 3.4–5)
ALBUMIN SERPL-MCNC: 3.8 G/DL (ref 3.4–5)
ALP BLD-CCNC: 125 U/L (ref 40–129)
ALP BLD-CCNC: 92 U/L (ref 40–129)
ALT SERPL-CCNC: <5 U/L (ref 10–40)
ALT SERPL-CCNC: <5 U/L (ref 10–40)
ANION GAP SERPL CALCULATED.3IONS-SCNC: 11 MMOL/L (ref 3–16)
ANION GAP SERPL CALCULATED.3IONS-SCNC: 11 MMOL/L (ref 3–16)
ANION GAP SERPL CALCULATED.3IONS-SCNC: 12 MMOL/L (ref 3–16)
APTT: 47.7 SEC (ref 24.2–36.2)
APTT: 80.8 SEC (ref 24.2–36.2)
AST SERPL-CCNC: 12 U/L (ref 15–37)
AST SERPL-CCNC: 15 U/L (ref 15–37)
BASOPHILS ABSOLUTE: 0 K/UL (ref 0–0.2)
BASOPHILS ABSOLUTE: 0.1 K/UL (ref 0–0.2)
BASOPHILS RELATIVE PERCENT: 0.5 %
BASOPHILS RELATIVE PERCENT: 0.8 %
BILIRUB SERPL-MCNC: 0.3 MG/DL (ref 0–1)
BILIRUB SERPL-MCNC: 0.3 MG/DL (ref 0–1)
BILIRUBIN URINE: NEGATIVE
BLOOD, URINE: NEGATIVE
BUN BLDV-MCNC: 11 MG/DL (ref 7–20)
BUN BLDV-MCNC: 11 MG/DL (ref 7–20)
BUN BLDV-MCNC: 16 MG/DL (ref 7–20)
CALCIUM IONIZED: 1.06 MMOL/L (ref 1.12–1.32)
CALCIUM SERPL-MCNC: 7.7 MG/DL (ref 8.3–10.6)
CALCIUM SERPL-MCNC: 8.2 MG/DL (ref 8.3–10.6)
CALCIUM SERPL-MCNC: 8.3 MG/DL (ref 8.3–10.6)
CHLORIDE BLD-SCNC: 97 MMOL/L (ref 99–110)
CHLORIDE BLD-SCNC: 97 MMOL/L (ref 99–110)
CHLORIDE BLD-SCNC: 98 MMOL/L (ref 99–110)
CLARITY: CLEAR
CO2: 23 MMOL/L (ref 21–32)
COLOR: YELLOW
CREAT SERPL-MCNC: <0.5 MG/DL (ref 0.6–1.2)
EKG ATRIAL RATE: 77 BPM
EKG DIAGNOSIS: NORMAL
EKG P AXIS: 45 DEGREES
EKG P-R INTERVAL: 174 MS
EKG Q-T INTERVAL: 396 MS
EKG QRS DURATION: 74 MS
EKG QTC CALCULATION (BAZETT): 448 MS
EKG R AXIS: -24 DEGREES
EKG T AXIS: 32 DEGREES
EKG VENTRICULAR RATE: 77 BPM
EOSINOPHILS ABSOLUTE: 0.1 K/UL (ref 0–0.6)
EOSINOPHILS ABSOLUTE: 0.1 K/UL (ref 0–0.6)
EOSINOPHILS RELATIVE PERCENT: 1 %
EOSINOPHILS RELATIVE PERCENT: 1.4 %
GFR AFRICAN AMERICAN: >60
GFR NON-AFRICAN AMERICAN: >60
GLOBULIN: 2.8 G/DL
GLOBULIN: 2.9 G/DL
GLUCOSE BLD-MCNC: 107 MG/DL (ref 70–99)
GLUCOSE BLD-MCNC: 108 MG/DL (ref 70–99)
GLUCOSE BLD-MCNC: 113 MG/DL (ref 70–99)
GLUCOSE URINE: NEGATIVE MG/DL
HCT VFR BLD CALC: 34.7 % (ref 36–48)
HCT VFR BLD CALC: 38.5 % (ref 36–48)
HEMOGLOBIN: 11.7 G/DL (ref 12–16)
HEMOGLOBIN: 12.5 G/DL (ref 12–16)
KETONES, URINE: NEGATIVE MG/DL
LEUKOCYTE ESTERASE, URINE: NEGATIVE
LYMPHOCYTES ABSOLUTE: 1.7 K/UL (ref 1–5.1)
LYMPHOCYTES ABSOLUTE: 1.8 K/UL (ref 1–5.1)
LYMPHOCYTES RELATIVE PERCENT: 22.9 %
LYMPHOCYTES RELATIVE PERCENT: 26 %
MAGNESIUM: 2.2 MG/DL (ref 1.8–2.4)
MCH RBC QN AUTO: 29.9 PG (ref 26–34)
MCH RBC QN AUTO: 30.5 PG (ref 26–34)
MCHC RBC AUTO-ENTMCNC: 32.4 G/DL (ref 31–36)
MCHC RBC AUTO-ENTMCNC: 33.7 G/DL (ref 31–36)
MCV RBC AUTO: 90.5 FL (ref 80–100)
MCV RBC AUTO: 92.4 FL (ref 80–100)
MICROSCOPIC EXAMINATION: NORMAL
MONOCYTES ABSOLUTE: 0.5 K/UL (ref 0–1.3)
MONOCYTES ABSOLUTE: 0.6 K/UL (ref 0–1.3)
MONOCYTES RELATIVE PERCENT: 7.2 %
MONOCYTES RELATIVE PERCENT: 7.8 %
NEUTROPHILS ABSOLUTE: 4.5 K/UL (ref 1.7–7.7)
NEUTROPHILS ABSOLUTE: 5.1 K/UL (ref 1.7–7.7)
NEUTROPHILS RELATIVE PERCENT: 65.3 %
NEUTROPHILS RELATIVE PERCENT: 67.1 %
NITRITE, URINE: NEGATIVE
PDW BLD-RTO: 14.6 % (ref 12.4–15.4)
PDW BLD-RTO: 14.8 % (ref 12.4–15.4)
PH UA: 7.5 (ref 5–8)
PH VENOUS: 7.51 (ref 7.35–7.45)
PLATELET # BLD: 239 K/UL (ref 135–450)
PLATELET # BLD: 248 K/UL (ref 135–450)
PMV BLD AUTO: 7 FL (ref 5–10.5)
PMV BLD AUTO: 7.4 FL (ref 5–10.5)
POTASSIUM REFLEX MAGNESIUM: 4 MMOL/L (ref 3.5–5.1)
POTASSIUM REFLEX MAGNESIUM: 4.3 MMOL/L (ref 3.5–5.1)
POTASSIUM SERPL-SCNC: 4 MMOL/L (ref 3.5–5.1)
PRO-BNP: 120 PG/ML (ref 0–449)
PRO-BNP: 170 PG/ML (ref 0–449)
PROTEIN UA: NEGATIVE MG/DL
RBC # BLD: 3.83 M/UL (ref 4–5.2)
RBC # BLD: 4.17 M/UL (ref 4–5.2)
SARS-COV-2, NAAT: NOT DETECTED
SODIUM BLD-SCNC: 131 MMOL/L (ref 136–145)
SODIUM BLD-SCNC: 131 MMOL/L (ref 136–145)
SODIUM BLD-SCNC: 133 MMOL/L (ref 136–145)
SPECIFIC GRAVITY UA: >1.03 (ref 1–1.03)
TOTAL PROTEIN: 6.2 G/DL (ref 6.4–8.2)
TOTAL PROTEIN: 6.6 G/DL (ref 6.4–8.2)
TROPONIN: <0.01 NG/ML
TROPONIN: <0.01 NG/ML
URINE REFLEX TO CULTURE: NORMAL
URINE TYPE: NORMAL
UROBILINOGEN, URINE: 0.2 E.U./DL
WBC # BLD: 6.9 K/UL (ref 4–11)
WBC # BLD: 7.6 K/UL (ref 4–11)

## 2020-12-13 PROCEDURE — 80053 COMPREHEN METABOLIC PANEL: CPT

## 2020-12-13 PROCEDURE — U0002 COVID-19 LAB TEST NON-CDC: HCPCS

## 2020-12-13 PROCEDURE — 99284 EMERGENCY DEPT VISIT MOD MDM: CPT

## 2020-12-13 PROCEDURE — 82330 ASSAY OF CALCIUM: CPT

## 2020-12-13 PROCEDURE — 36415 COLL VENOUS BLD VENIPUNCTURE: CPT

## 2020-12-13 PROCEDURE — 71045 X-RAY EXAM CHEST 1 VIEW: CPT

## 2020-12-13 PROCEDURE — 6370000000 HC RX 637 (ALT 250 FOR IP): Performed by: INTERNAL MEDICINE

## 2020-12-13 PROCEDURE — 85730 THROMBOPLASTIN TIME PARTIAL: CPT

## 2020-12-13 PROCEDURE — 93005 ELECTROCARDIOGRAM TRACING: CPT | Performed by: EMERGENCY MEDICINE

## 2020-12-13 PROCEDURE — 81003 URINALYSIS AUTO W/O SCOPE: CPT

## 2020-12-13 PROCEDURE — 2500000003 HC RX 250 WO HCPCS: Performed by: EMERGENCY MEDICINE

## 2020-12-13 PROCEDURE — 83880 ASSAY OF NATRIURETIC PEPTIDE: CPT

## 2020-12-13 PROCEDURE — 2500000003 HC RX 250 WO HCPCS: Performed by: INTERNAL MEDICINE

## 2020-12-13 PROCEDURE — 85025 COMPLETE CBC W/AUTO DIFF WBC: CPT

## 2020-12-13 PROCEDURE — 6360000004 HC RX CONTRAST MEDICATION: Performed by: EMERGENCY MEDICINE

## 2020-12-13 PROCEDURE — 83735 ASSAY OF MAGNESIUM: CPT

## 2020-12-13 PROCEDURE — 71260 CT THORAX DX C+: CPT

## 2020-12-13 PROCEDURE — 2580000003 HC RX 258: Performed by: INTERNAL MEDICINE

## 2020-12-13 PROCEDURE — 6360000002 HC RX W HCPCS: Performed by: EMERGENCY MEDICINE

## 2020-12-13 PROCEDURE — 87040 BLOOD CULTURE FOR BACTERIA: CPT

## 2020-12-13 PROCEDURE — 1200000000 HC SEMI PRIVATE

## 2020-12-13 PROCEDURE — 84484 ASSAY OF TROPONIN QUANT: CPT

## 2020-12-13 RX ORDER — LOTEPREDNOL ETABONATE 5 MG/ML
1 SUSPENSION/ DROPS OPHTHALMIC 2 TIMES DAILY PRN
COMMUNITY

## 2020-12-13 RX ORDER — ASPIRIN 81 MG/1
81 TABLET ORAL DAILY
Status: DISCONTINUED | OUTPATIENT
Start: 2020-12-13 | End: 2020-12-17 | Stop reason: HOSPADM

## 2020-12-13 RX ORDER — SODIUM CHLORIDE 0.9 % (FLUSH) 0.9 %
10 SYRINGE (ML) INJECTION PRN
Status: DISCONTINUED | OUTPATIENT
Start: 2020-12-13 | End: 2020-12-17 | Stop reason: HOSPADM

## 2020-12-13 RX ORDER — CARBIDOPA AND LEVODOPA 50; 200 MG/1; MG/1
1 TABLET, EXTENDED RELEASE ORAL NIGHTLY
Status: DISCONTINUED | OUTPATIENT
Start: 2020-12-13 | End: 2020-12-17 | Stop reason: HOSPADM

## 2020-12-13 RX ORDER — TRAMADOL HYDROCHLORIDE 50 MG/1
50 TABLET ORAL EVERY 6 HOURS PRN
Status: DISCONTINUED | OUTPATIENT
Start: 2020-12-13 | End: 2020-12-17 | Stop reason: HOSPADM

## 2020-12-13 RX ORDER — HEPARIN SODIUM 1000 [USP'U]/ML
4200 INJECTION, SOLUTION INTRAVENOUS; SUBCUTANEOUS ONCE
Status: COMPLETED | OUTPATIENT
Start: 2020-12-13 | End: 2020-12-13

## 2020-12-13 RX ORDER — HEPARIN SODIUM 1000 [USP'U]/ML
40 INJECTION, SOLUTION INTRAVENOUS; SUBCUTANEOUS PRN
Status: DISCONTINUED | OUTPATIENT
Start: 2020-12-13 | End: 2020-12-13

## 2020-12-13 RX ORDER — PRAVASTATIN SODIUM 10 MG
20 TABLET ORAL DAILY
Status: CANCELLED | OUTPATIENT
Start: 2020-12-13

## 2020-12-13 RX ORDER — ACETAMINOPHEN 650 MG/1
650 SUPPOSITORY RECTAL EVERY 4 HOURS PRN
Status: DISCONTINUED | OUTPATIENT
Start: 2020-12-13 | End: 2020-12-17 | Stop reason: HOSPADM

## 2020-12-13 RX ORDER — POLYETHYLENE GLYCOL 3350 17 G/17G
17 POWDER, FOR SOLUTION ORAL DAILY
Status: DISCONTINUED | OUTPATIENT
Start: 2020-12-13 | End: 2020-12-17 | Stop reason: HOSPADM

## 2020-12-13 RX ORDER — POLYETHYLENE GLYCOL 3350 17 G/17G
17 POWDER, FOR SOLUTION ORAL DAILY PRN
Status: DISCONTINUED | OUTPATIENT
Start: 2020-12-13 | End: 2020-12-17 | Stop reason: HOSPADM

## 2020-12-13 RX ORDER — HEPARIN SODIUM 1000 [USP'U]/ML
80 INJECTION, SOLUTION INTRAVENOUS; SUBCUTANEOUS PRN
Status: DISCONTINUED | OUTPATIENT
Start: 2020-12-13 | End: 2020-12-13

## 2020-12-13 RX ORDER — ONDANSETRON 2 MG/ML
4 INJECTION INTRAMUSCULAR; INTRAVENOUS EVERY 4 HOURS PRN
Status: DISCONTINUED | OUTPATIENT
Start: 2020-12-13 | End: 2020-12-17 | Stop reason: HOSPADM

## 2020-12-13 RX ORDER — HEPARIN SODIUM 10000 [USP'U]/100ML
8.4 INJECTION, SOLUTION INTRAVENOUS CONTINUOUS
Status: DISCONTINUED | OUTPATIENT
Start: 2020-12-13 | End: 2020-12-17 | Stop reason: HOSPADM

## 2020-12-13 RX ORDER — VERAPAMIL HYDROCHLORIDE 360 MG/1
360 CAPSULE, DELAYED RELEASE PELLETS ORAL EVERY MORNING
COMMUNITY

## 2020-12-13 RX ORDER — ACETAMINOPHEN 325 MG/1
650 TABLET ORAL EVERY 4 HOURS PRN
Status: DISCONTINUED | OUTPATIENT
Start: 2020-12-13 | End: 2020-12-17 | Stop reason: HOSPADM

## 2020-12-13 RX ORDER — FAMOTIDINE 20 MG/1
20 TABLET, FILM COATED ORAL 2 TIMES DAILY
Status: DISCONTINUED | OUTPATIENT
Start: 2020-12-13 | End: 2020-12-17 | Stop reason: HOSPADM

## 2020-12-13 RX ORDER — LISINOPRIL 5 MG/1
5 TABLET ORAL DAILY
Status: DISCONTINUED | OUTPATIENT
Start: 2020-12-13 | End: 2020-12-17 | Stop reason: HOSPADM

## 2020-12-13 RX ORDER — BUSPIRONE HYDROCHLORIDE 7.5 MG/1
7.5 TABLET ORAL NIGHTLY
Status: DISCONTINUED | OUTPATIENT
Start: 2020-12-13 | End: 2020-12-17 | Stop reason: HOSPADM

## 2020-12-13 RX ORDER — SODIUM CHLORIDE 0.9 % (FLUSH) 0.9 %
10 SYRINGE (ML) INJECTION EVERY 12 HOURS SCHEDULED
Status: DISCONTINUED | OUTPATIENT
Start: 2020-12-13 | End: 2020-12-17 | Stop reason: HOSPADM

## 2020-12-13 RX ADMIN — HEPARIN SODIUM 9.4 ML/HR: 10000 INJECTION, SOLUTION INTRAVENOUS at 03:23

## 2020-12-13 RX ADMIN — LISINOPRIL 5 MG: 5 TABLET ORAL at 08:26

## 2020-12-13 RX ADMIN — HYDROMORPHONE HYDROCHLORIDE 0.5 MG: 1 INJECTION, SOLUTION INTRAMUSCULAR; INTRAVENOUS; SUBCUTANEOUS at 03:08

## 2020-12-13 RX ADMIN — SODIUM CHLORIDE, PRESERVATIVE FREE 10 ML: 5 INJECTION INTRAVENOUS at 21:06

## 2020-12-13 RX ADMIN — TRAMADOL HYDROCHLORIDE 50 MG: 50 TABLET, FILM COATED ORAL at 11:43

## 2020-12-13 RX ADMIN — CALCIUM GLUCONATE 1 G: 20 INJECTION, SOLUTION INTRAVENOUS at 15:16

## 2020-12-13 RX ADMIN — FAMOTIDINE 20 MG: 20 TABLET, FILM COATED ORAL at 08:26

## 2020-12-13 RX ADMIN — IOPAMIDOL 75 ML: 755 INJECTION, SOLUTION INTRAVENOUS at 02:11

## 2020-12-13 RX ADMIN — CARBIDOPA AND LEVODOPA 2 TABLET: 25; 100 TABLET ORAL at 11:43

## 2020-12-13 RX ADMIN — ASPIRIN 81 MG: 81 TABLET, COATED ORAL at 08:26

## 2020-12-13 RX ADMIN — BUSPIRONE HYDROCHLORIDE 7.5 MG: 7.5 TABLET ORAL at 21:05

## 2020-12-13 RX ADMIN — VERAPAMIL HYDROCHLORIDE 360 MG: 180 TABLET, FILM COATED, EXTENDED RELEASE ORAL at 08:26

## 2020-12-13 RX ADMIN — HEPARIN SODIUM 4200 UNITS: 1000 INJECTION INTRAVENOUS; SUBCUTANEOUS at 03:24

## 2020-12-13 RX ADMIN — CARBIDOPA AND LEVODOPA 2 TABLET: 25; 100 TABLET ORAL at 08:26

## 2020-12-13 RX ADMIN — CARBIDOPA AND LEVODOPA 1 TABLET: 50; 200 TABLET, EXTENDED RELEASE ORAL at 21:13

## 2020-12-13 RX ADMIN — CARBIDOPA AND LEVODOPA 2 TABLET: 25; 100 TABLET ORAL at 15:16

## 2020-12-13 RX ADMIN — SERTRALINE HYDROCHLORIDE 50 MG: 50 TABLET ORAL at 08:26

## 2020-12-13 RX ADMIN — FAMOTIDINE 20 MG: 20 TABLET, FILM COATED ORAL at 21:05

## 2020-12-13 RX ADMIN — CARBIDOPA AND LEVODOPA 2 TABLET: 25; 100 TABLET ORAL at 17:13

## 2020-12-13 ASSESSMENT — PAIN DESCRIPTION - ORIENTATION
ORIENTATION: RIGHT
ORIENTATION: RIGHT
ORIENTATION: RIGHT;UPPER
ORIENTATION: LEFT

## 2020-12-13 ASSESSMENT — PAIN DESCRIPTION - LOCATION
LOCATION: CHEST
LOCATION: CHEST;NECK

## 2020-12-13 ASSESSMENT — PAIN SCALES - GENERAL
PAINLEVEL_OUTOF10: 10
PAINLEVEL_OUTOF10: 8
PAINLEVEL_OUTOF10: 6
PAINLEVEL_OUTOF10: 6
PAINLEVEL_OUTOF10: 8
PAINLEVEL_OUTOF10: 8

## 2020-12-13 ASSESSMENT — PAIN DESCRIPTION - FREQUENCY
FREQUENCY: CONTINUOUS

## 2020-12-13 ASSESSMENT — PAIN DESCRIPTION - DESCRIPTORS
DESCRIPTORS: SHARP
DESCRIPTORS: SHARP

## 2020-12-13 ASSESSMENT — PAIN DESCRIPTION - ONSET
ONSET: ON-GOING

## 2020-12-13 ASSESSMENT — PAIN DESCRIPTION - PROGRESSION
CLINICAL_PROGRESSION: GRADUALLY WORSENING
CLINICAL_PROGRESSION: NOT CHANGED

## 2020-12-13 ASSESSMENT — PAIN DESCRIPTION - PAIN TYPE
TYPE: ACUTE PAIN

## 2020-12-13 ASSESSMENT — PAIN - FUNCTIONAL ASSESSMENT
PAIN_FUNCTIONAL_ASSESSMENT: PREVENTS OR INTERFERES SOME ACTIVE ACTIVITIES AND ADLS
PAIN_FUNCTIONAL_ASSESSMENT: PREVENTS OR INTERFERES SOME ACTIVE ACTIVITIES AND ADLS

## 2020-12-13 NOTE — PROGRESS NOTES
Clinical Pharmacy Note  Heparin Dosing       Lab Results   Component Value Date    APTT 47.7 12/13/2020     Lab Results   Component Value Date    HGB 11.7 12/13/2020    HCT 34.7 12/13/2020     12/13/2020       Current Infusion Rate: 8.4 mL/hr    Plan:  Bolus: 000 units  Rate: 9.4 mL/hr  Next aPTT: 0100    Pharmacy will continue to monitor and adjust based on aPTT results.

## 2020-12-13 NOTE — ED NOTES
Pt daughter at bedside now and requesting to speak to MD and asking for pain medication for the patient.       Elinor Warren RN  12/13/20 6440

## 2020-12-13 NOTE — ED NOTES
ED SBAR report provider to Rajani Lopez RN. Patient to be transported to Room 4130 via stretcher by ED tech. Patient transported with bedside cardiac monitor. IV site clean, dry, and intact. MEWS score and pain assessed as 1 and documented. Updated patient and family on plan of care.      Ira Haines RN  12/13/20 7841

## 2020-12-13 NOTE — CONSULTS
Clinical Pharmacy Note  Heparin Dosing Consult    Luis Lee is a 82 y.o. female ordered heparin per high dose nomogram by Dr. Vlad Espinoza. No results found for: APTT  Lab Results   Component Value Date    HGB 12.5 12/13/2020    HCT 38.5 12/13/2020     12/13/2020       Ht Readings from Last 1 Encounters:   12/03/20 4' 11\" (1.499 m)        Wt Readings from Last 1 Encounters:   12/13/20 144 lb 10 oz (65.6 kg)     Dosing weight: 52.2 kg    Assessment/Plan:  Initial bolus: 4200 units  Initial infusion rate: 9.4 mL/hr  Next aPTT: 0930  12/13/20    Pharmacy will continue to monitor adjust heparin based on aPTT results using nomogram below:     HIGH DOSE HEPARIN PROTOCOL (DVT/PE)     Initial Bolus: 80 units/kg Max Bolus: 10,000 units       Initial Rate: 18 units/kg/hr Max Initial Rate: 2,100 units/hr     aPTT < 36   Heparin 80 units/kg bolus Increase infusion by 4 units/kg/hr       (maximum 10,000 units)   aPTT 37-48   Heparin 40 units/kg bolus Increase infusion by 2 units/kg/hr       (maximum 5,000 units)   aPTT 49-76   No bolus   No change   aPTT 77-85   No bolus   Decrease infusion by 2 units/kg/hr   aPTT 86-94   Hold heparin for 1 hour Decrease infusion by 3 units/kg/hr   aPTT      Hold heparin for 1 hour Decrease infusion by 4 units/kg/hr   aPTT > 103   Hold heparin for 1 hour Decrease infusion by 6 units/kg/hr    Obtain aPTT 6 hours after initial bolus and 6 hours after any dose change until two consecutive therapeutic aPTTs are achieved - then daily.     Sangeetha Mcneill, PharmD  12/13/2020 4:39 AM

## 2020-12-13 NOTE — PROGRESS NOTES
Pt arrived on the unit @ 5:33 am pt is alert and oriented*4, pt vs stable and does not complain of any pain, bed is locked and in the lowest position and call light within reach.

## 2020-12-13 NOTE — PROGRESS NOTES
Pt asking to walk to restroom to have a BM. Assisted pt with standing, but she was unable to maintain posture and unable to take any steps. Assisted pt to sit on edge of bed, but she was then unable to assist herself back into bed due to weakness. Called for assistance from other staff who helped reposition pt back in bed. Utilized bedpan, but pt unable to have BM.

## 2020-12-13 NOTE — CONSULTS
[address] [address]                                  CONSULTATION    PATIENT NAME: Kaitlyn Pal                   :        1935  MED REC NO:   [id number]                          ROOM:       9984  ACCOUNT NO:   [de-identified]                           ADMIT DATE: 2020  PROVIDER:     Renee Montague MD    HEMATOLOGY CONSULTATION    CONSULT DATE:  2020    REASON FOR CONSULTATION:  Pulmonary embolus. CONSULTING PROVIDER:  Amee Olvera MD    HISTORY OF PRESENT ILLNESS:  The patient is an 82-year-old female with a  history of Parkinson's disease who recently underwent a procedure for a  neurogenic bladder who presented to the hospital with a few day history  of progressive pleuritic chest pain especially on the right side. A CT  pulmonary embolus study was done that showed right-sided pulmonary  emboli. There was a small right pleural effusion. She is now on  heparin. She is feeling a bit better. She has never had a clot in the  past nor has a family history of clotting. She denies any weight loss. A few years ago, she stopped getting her cancer screenings including  mammograms and colonoscopies due to her advanced age and desire not to  treat to get them anymore. She does follow quite a bit because of her  Parkinson's disease unfortunately. PAST MEDICAL HISTORY:  1. Hypertension. 2.  Parkinson's disease. 3.  Neurogenic bladder. 4.  Hiatal hernia. PAST SURGICAL HISTORY:  1.  Status post _____  2. Status post hysterectomy. ALLERGIES:  She is allergic to NORVASC, ESTROGEN, PENICILLIN. MEDICINES:  Her medicines are BuSpar 7.5 mg p.o. daily, aspirin 81 mg  p.o. daily, Pepcid 20 mg p.o. b.i.d., Sinemet two tablets five times  daily, lisinopril 5 mg p.o. daily, Zoloft 50 mg p.o. daily, verapamil SR  360 mg p.o. daily. SOCIAL HISTORY:  She is  and lives in an assisted living at [facility]. She does not drink or smoke. She is retired. FAMILY HISTORY:  Her daughter had breast cancer and has CML. There were  no other cancers or blood clots in the family. REVIEW OF SYSTEMS:  She denies any recent fever, chills, sweats, nausea,  vomiting, abdominal pain, shortness of breath, headaches, any new bone  aches, dysphagia, odynophagia, diarrhea, constipation, hemoptysis,  hematemesis, change in vision/hearing/smell/taste, weakness, neuropathy,  skin rashes, productive cough, urinary or bowel prolapse or  incontinence, petechiae, vaginal bleeding, pruritus, hallucinations,  nasal congestion or drainage, seizures, strokes, syncope, depression,  anxiety, suicidal ideations, melena, or hematochezia. She has mild to  moderate fatigue and chest pain as above. Her 10-system review of  systems is otherwise negative. PHYSICAL EXAMINATION:  VITAL SIGNS:  She is afebrile with normal vital signs. GENERAL:  She is in no acute distress. HEENT:  Her pupils are round and reactive to light and accommodation. Extraocular muscles are intact. NECK:  She has no jugular venous distention. No thyromegaly. Oropharynx is clear. She has no carotid bruits. She has no palpable  supraclavicular or axillary lymphadenopathy. HEART:  Regular rate and rhythm. LUNGS:  Clear to auscultation bilaterally. ABDOMEN:  Nondistended, nontender with bowel sounds x4. No  hepatosplenomegaly. EXTREMITIES:  She has no peripheral clubbing, cyanosis, or edema. NEUROLOGIC EXAM:  Stable. LABORATORY DATA:  Her white blood cell count is 6.9, hemoglobin 11.7,  platelets of 366. ASSESSMENT AND PLAN:  Pulmonary embolus. This could have been provoked  by her chronic immobility. Her Parkinson's causes her to be chronically  immobile.   She does get up several times a day to go to the bathroom and do various other chores, however. I will send a partial hypercoag  workup. I do typically recommend Eliquis or Xarelto. We will need to  make sure it does not interact with any of her other medications. In  addition, her main problem is that she falls frequently. She often hits  her head. This could be a very dangerous situation on blood thinners. Therefore, we may need to consider putting in a temporary IVC filter and  not sending her out on anticoagulation. I will discuss this with the  other doctors. She has no signs or symptoms of an occult malignancy. She has no desire to go looking for that since she would not treat it  even if she found it. Thank you for the consultation. I will follow closely.         Juan Rojo MD    D: 12/13/2020 11:05:27       T: 12/13/2020 12:58:57     [id number]  Job#: [id number]     Doc#: [id number]    CC:  Negrita Estrella MD

## 2020-12-13 NOTE — PLAN OF CARE
Problem: Falls - Risk of:  Goal: Will remain free from falls  Description: Will remain free from falls  Outcome: Ongoing  Note: Pt free from injury or falls at this time, fall precautions in place, bed in low position, side rail up x2, Ware Fall Risk: Medium (25-44), bed alarm on, reoriented to room and call light, reminded not to get up without assistance, call light in reach, will continue to monitor. Pt verbalizes understanding of fall risk procedures. Goal: Absence of physical injury  Description: Absence of physical injury  Outcome: Ongoing  Note: Pt has not incurred any type of physical injury this shift. Problem: Pain:  Goal: Patient's pain/discomfort is manageable  Description: Patient's pain/discomfort is manageable  Outcome: Ongoing  Note: Pt c/o right-sided chest pain. Will monitor and medicate as needed.

## 2020-12-13 NOTE — PROGRESS NOTES
4 Eyes Skin Assessment     NAME:  Krissy Ybarra  YOB: 1935  MEDICAL RECORD NUMBER:  [id number]    The patient is being assess for  Admission    I agree that 2 RN's have performed a thorough Head to Toe Skin Assessment on the patient. ALL assessment sites listed below have been assessed. Areas assessed by both nurses:    Head, Face, Ears, Shoulders, Back, Chest, Arms, Elbows, Hands, Sacrum. Buttock, Coccyx, Ischium and Legs. Feet and Heels        Does the Patient have a Wound?  No noted wound(s)       Raheem Prevention initiated:  Yes   Wound Care Orders initiated:  No    Pressure Injury (Stage 3,4, Unstageable, DTI, NWPT, and Complex wounds) if present place consult order under [de-identified] No    New and Established Ostomies if present place consult order under : No      Nurse 1 eSignature: Electronically signed by Lynda Shi RN on 12/13/20 at 7:01 AM EST    **SHARE this note so that the co-signing nurse is able to place an eSignature**    Nurse 2 eSignature: Electronically signed by Yuriy Singh RN on 12/13/20 at 2:18 PM EST

## 2020-12-13 NOTE — ED NOTES
Pt in by squad from [facility] for chest pain. Pt resting comfortably in bed. She still complains of right sided chest pain with pain by neck as well. I spoke to daughter who called and gave her an update and told her I'd call when the covid swab was back and she was allowed to have visitors. No needs at this time. Will continue to monitor.      Andreea Swann RN  12/13/20 [id number]

## 2020-12-13 NOTE — ED NOTES
Bed: B-33  Expected date: 12/13/20  Expected time: 12:27 AM  Means of arrival: [facility]  Comments:  Chest pain     Adant Bell Doe, [address] [facility]  12/13/20 9891

## 2020-12-13 NOTE — CONSULTS
Hematology Consult    See dictation    A/P:  1. PE. She is chronically immobile due to Parkinson's. I would switch to DOAC but need to make sure it doesn't interact with her other meds. The other problem is that she falls frequently. Therefore, we may need to put temporary filter in and not anticoagulate. Will think about it for now. Continue heparin. Will send partial hypercoag workup. Thanks for consult. Will follow closely.     Nancy Sutton MD

## 2020-12-13 NOTE — PROGRESS NOTES
Clinical Pharmacy Note  Heparin Dosing       Lab Results   Component Value Date    APTT 80.8 12/13/2020     Lab Results   Component Value Date    HGB 11.7 12/13/2020    HCT 34.7 12/13/2020     12/13/2020       Current Infusion Rate: 9.4 mL/hr    Plan:  Rate: decrease to 8.4 mL/hr  Next aPTT: 12/13/20 1630    Pharmacy will continue to monitor and adjust based on aPTT results.   Becky Hardwick RP,12/13/2020,10:25 AM

## 2020-12-13 NOTE — ED PROVIDER NOTES
[facility]  eMERGENCY dEPARTMENT eNCOUnter      Pt Name: Annamaria Mariano  MRN: [id number]  [place] 1935  Date of evaluation: 12/13/2020  Provider: Rodolfo Bartlett MD  PCP: [facility]       Chief Complaint   Patient presents with    Chest Pain       HISTORY OFPRESENT ILLNESS   (Location/Symptom, Timing/Onset, Context/Setting, Quality, Duration, Modifying Factors,Severity)  Note limiting factors. Annamaria Mariano is a 82 y.o. female pain on the right side of her chest that was on and off all day then at night she is felt like she could fall asleep and she felt short of breath like she could not catch her breath she denies any fever she lives in a nursing home she says she is not been exposed to coronavirus    Nursing Notes were all reviewed and agreed with or any disagreements were addressed  in the HPI. REVIEW OF SYSTEMS    (2-9 systems for level 4, 10 or more for level 5)     Review of Systems    Positives and Pertinent negatives as per HPI. Except as noted above in the ROS, all other systems were reviewed andnegative.        PASTMEDICAL HISTORY     Past Medical History:   Diagnosis Date    Hiatal hernia     High blood pressure     Parkinson disease ([address]     Suprapubic catheter ([address]          SURGICAL HISTORY       Past Surgical History:   Procedure Laterality Date    ABDOMEN SURGERY      bowel obstruction    APPENDECTOMY  1948    CYSTOSCOPY N/A 12/3/2020    CYSTOSCOPY,  WITH INTRAVESICAL INJECTION OF BOTOX 200UNITS, WITH SUPRAPUBIC TUBE EXCHANGE performed by Jing Shook MD at [address] [facility] 60 62 63   R [facility] 56 SURGERY  2006    repair vagina bladder and rectum         CURRENT MEDICATIONS       Previous Medications    ACETAMINOPHEN (TYLENOL) 325 MG TABLET    Take 650 mg by mouth every 6 hours as needed for Pain    ALENDRONATE (FOSAMAX) 70 MG TABLET    Take 70 mg by mouth every 7 days    ASPIRIN (ASPIRIN 81) 81 MG EC TABLET    daily Stopped for surgery    BUSPIRONE (BUSPAR) 5 MG TABLET    Take 7.5 mg by mouth nightly    CARBIDOPA-LEVODOPA (SINEMET)  MG PER TABLET    Take 1 tablet by mouth 6 times daily Takes 50mg ER at bedtime    CARBOXYMETHYLCELLULOSE 1 % OPHTHALMIC SOLUTION    1 drop as needed for Dry Eyes    CRANBERRY 1000 MG CAPS    Take by mouth daily    FAMOTIDINE (PEPCID) 20 MG TABLET    Take 20 mg by mouth 2 times daily    LISINOPRIL (PRINIVIL;ZESTRIL) 5 MG TABLET    Take 5 mg by mouth daily    MAGNESIUM PO    Take 1 tablet by mouth Daily with supper    MULTIPLE VITAMIN (MULTIVITAMIN) TABLET    Take 1 tablet by mouth daily    POLYETHYLENE GLYCOL (GLYCOLAX) 17 G PACKET    Take 17 g by mouth daily    PRAVASTATIN (PRAVACHOL) 20 MG TABLET    Take 20 mg by mouth daily    PROBIOTIC PRODUCT (PROBIOTIC-10 PO)    Take 1 tablet by mouth daily    SERTRALINE (ZOLOFT) 50 MG TABLET    Take 50 mg by mouth daily    VERAPAMIL (CALAN-SR) 240 MG CR TABLET    Take 360 mg by mouth daily     VITAMIN C (ASCORBIC ACID) 500 MG TABLET    Take 1,000 mg by mouth Daily with lunch    VITAMIN D (CHOLECALCIFEROL) 25 MCG (1000 UT) TABS TABLET    Take 1,000 Units by mouth daily       ALLERGIES     Caduet [amlodipine-atorvastatin], Estrogens conjugated, Penicillins, and Terramycin [oxytetracycline-lidocaine]    FAMILY HISTORY     No family history on file.        SOCIAL HISTORY       Social History     Socioeconomic History    Marital status:      Spouse name: Not on file    Number of children: Not on file    Years of education: Not on file    Highest education level: Not on file   Occupational History    Not on file   Social Needs    Financial resource strain: Not on file    Food insecurity     Worry: Not on file     Inability: Not on file    Transportation needs     Medical: Not on file     Non-medical: Not on file Tobacco Use    Smoking status: Never Smoker    Smokeless tobacco: Never Used   Substance and Sexual Activity    Alcohol use: Yes     Comment: occasional    Drug use: No    Sexual activity: Not on file   Lifestyle    Physical activity     Days per week: Not on file     Minutes per session: Not on file    Stress: Not on file   Relationships    Social connections     Talks on phone: Not on file     Gets together: Not on file     Attends Synagogue service: Not on file     Active member of club or organization: Not on file     Attends meetings of clubs or organizations: Not on file     Relationship status: Not on file    Intimate partner violence     Fear of current or ex partner: Not on file     Emotionally abused: Not on file     Physically abused: Not on file     Forced sexual activity: Not on file   Other Topics Concern    Not on file   Social History Narrative    Not on file       SCREENINGS      @[facility]([id number])@      PHYSICAL EXAM    (up to 7 for level 4, 8 or more for level 5)     ED Triage Vitals [12/13/20 0039]   BP Temp Temp Source Pulse Resp SpO2 Height Weight   (!) 176/76 98.2 °F (36.8 °C) Oral 78 16 100 % -- 144 lb 10 oz (65.6 kg)       Physical Exam      General Appearance:  Alert, cooperative, no distress, appears stated age. Head:  Normocephalic, without obviousabnormality, atraumatic. Eyes:  conjunctiva/corneas clear, EOM's intact. Sclera anicteric. ENT: Mucous membranes moist.   Neck: Supple, symmetrical, trachea midline, no adenopathy. No jugular venous distention. Lungs:   Clear to auscultation bilaterally, respirationsunlabored. No rales, rhonchi or wheezes. Chest Wall:  No tenderness. Heart:  Regular rate and rhythm, S1 and S2 normal, no murmur, rub or gallop. Abdomen:   Soft, non-tender, bowel sounds active,   no masses, no organomegaly. Extremities: No edema, cords or calf tenderness. Full range of motion.    Pulses: 2+ and symmetric   Skin: Turgor is normal, no rashes or lesions. Neurologic: Alert and oriented X 3. No focal findings. Motor grossly normal.  Speech clear, no drift, CN III-XII grossly intact,        DIAGNOSTIC RESULTS   LABS:    Labs Reviewed   COMPREHENSIVE METABOLIC PANEL - Abnormal; Notable for the following components:       Result Value    Sodium 133 (*)     Chloride 98 (*)     Glucose 107 (*)     CREATININE <0.5 (*)     ALT <5 (*)     All other components within normal limits    Narrative:     Performed at:  [facility]  [address], [place] [address]   Phone [phone]   CULTURE, BLOOD 1   CULTURE, BLOOD 2   CBC WITH AUTO DIFFERENTIAL    Narrative:     Performed at:  [facility]  [address], [facility] [place] [address]   Phone [phone]   MAGNESIUM    Narrative:     Performed at:  [place] [address], [facility] [place] [address]   Phone [phone]   TROPONIN    Narrative:     Performed at:  [address] [facility] [place] [address]   Phone [phone]   BRAIN NATRIURETIC PEPTIDE    Narrative:     Performed at:  [facility]  [facility] [address], [facility] [place] [address]   Phone [phone]    Narrative:     Performed at:  [facility]  [facility] [address], [facility] [place] [address]   Phone [phone]   CBC WITH AUTO DIFFERENTIAL   COMPREHENSIVE METABOLIC PANEL W/ REFLEX TO MG FOR LOW K   TROPONIN   BRAIN NATRIURETIC PEPTIDE   URINE RT REFLEX TO CULTURE   COVID-19   COVID-19   COVID-19   COVID-19   CBC   APTT   APTT       All other labs were within normal range or not returned as of this dictation. EKG:  All EKG's are interpreted by the Emergency Department Physician who eithersigns or Co-signs this chart in the absence of a cardiologist.    The Ekg interpreted by me in the absence of a cardiologist shows.  Normal Sinus rhythm   Rate of   77  Axis is   Normal  QTc is  within an acceptable range  Intervals and Durations are unremarkable. Nonspecific ST-T wave changes appreciated. No evidence of acute ischemia. RADIOLOGY:   Non-plain film images such as CT, Ultrasound and MRI are read by the radiologist. Plain radiographic images are visualized by myself. *    Interpretation per the Radiologist below, if available at the time of this note:    CT CHEST PULMONARY EMBOLISM W CONTRAST   Preliminary Result   1. Right lung pulmonary emboli. 2. Small right pleural effusion with adjacent atelectasis. Findings were discussed with Dr. Philippa Holstein on 12/13/2020 at 2:35 a.m.         XR CHEST PORTABLE   Preliminary Result   1. Mild perihilar vascular congestion without overt failure. PROCEDURES   Unless otherwise noted below, none     Procedures    *    CRITICAL CARE TIME   N/A      EMERGENCY DEPARTMENT COURSE and DIFFERENTIALDIAGNOSIS/MDM:   Vitals:    Vitals:    12/13/20 0039 12/13/20 0100 12/13/20 0130 12/13/20 0145   BP: (!) 176/76 (!) 156/66 (!) 148/64 (!) 157/56   Pulse: 78 78 76 79   Resp: 16 24 24 21   Temp: 98.2 °F (36.8 °C)      TempSrc: Oral      SpO2: 100% 92% 95% 95%   Weight: 144 lb 10 oz (65.6 kg)          Patient was given thefollowing medications:  Medications   heparin (porcine) injection 5,250 Units (has no administration in time range)   heparin (porcine) injection 5,250 Units (has no administration in time range)   heparin (porcine) injection 2,620 Units (has no administration in time range)   heparin 25,000 unit in sodium chloride 0.45% 250 mL infusion (has no administration in time range)   iopamidol (ISOVUE-370) 76 % injection 75 mL (75 mLs Intravenous Given 12/13/20 0211)           The patient tolerated their visit well. The patient and / or the familywere informed of the results of any tests, a time was given to answer questions.     FINAL IMPRESSION      1. Pulmonary embolus, right [facility])          DISPOSITION/PLAN   DISPOSITION Decision To Admit 12/13/2020 02:35:34 AM      PATIENT REFERRED TO:  No follow-up provider specified.     DISCHARGE MEDICATIONS:  New Prescriptions    No medications on file       DISCONTINUED MEDICATIONS:  Discontinued Medications    No medications on file              (Please note that portions of this note were completed with a voice recognition program.  Efforts were made to edit the dictations but occasionally words are mis-transcribed.)    Rodolfo Bartlett MD (electronically signed)      Rodolfo Bartlett MD  12/13/20 7435

## 2020-12-13 NOTE — ED NOTES
Pt is resting comfortably at this time. Pain is minimal. Daughter is at bedside. Catheter emptied (700cc output). Waiting on room upstairs to get cleaned. Will continue to monitor.      Tommy Magallon RN  12/13/20 9310

## 2020-12-13 NOTE — PROGRESS NOTES
Pt sitting up in bed eating breakfast at beginning of shift. She c/o ongoing right-sided chest pain rated 6/10, described as sharp. She denies having any nausea, numbness, tingling, or SOB. LS clear. She is SR on telemetry. Suprapubic catheter in place to drainage bag with clear yellow urine present. Fall precautions in place, belongings within reach. Will continue to monitor and assess.

## 2020-12-13 NOTE — PROGRESS NOTES
Pt resting in bed in evening. She seems to be slightly confused at times, but is easily reoriented. Heparin gtt remains infusing at 8.4 mL/hr. Pt remains SR on telemetry. Will continue to monitor.

## 2020-12-13 NOTE — H&P
rectum       Medications Prior to Admission:    Prior to Admission medications    Medication Sig Start Date End Date Taking?  Authorizing Provider   carbidopa-levodopa (SINEMET)  MG per tablet Take 2 tablets by mouth 5 times daily    Yes Historical Provider, MD   verapamil (CALAN SR) 180 MG extended release tablet Take 360 mg by mouth every morning   Yes Historical Provider, MD   loteprednol (LOTEMAX) 0.5 % ophthalmic suspension Place 1 drop into both eyes 2 times daily   Yes Historical Provider, MD   lisinopril (PRINIVIL;ZESTRIL) 5 MG tablet Take 5 mg by mouth daily   Yes Historical Provider, MD   carbidopa-levodopa (SINEMET)  MG per tablet Take 2 tablets by mouth 5 times daily    Yes Historical Provider, MD   pravastatin (PRAVACHOL) 20 MG tablet Take 20 mg by mouth daily 2/11/19  Yes Historical Provider, MD   alendronate (FOSAMAX) 70 MG tablet Take 70 mg by mouth every 7 days Indications: Sundays  3/12/19  Yes Historical Provider, MD   famotidine (PEPCID) 20 MG tablet Take 20 mg by mouth 2 times daily   Yes Historical Provider, MD   busPIRone (BUSPAR) 5 MG tablet Take 7.5 mg by mouth nightly   Yes Historical Provider, MD   sertraline (ZOLOFT) 50 MG tablet Take 50 mg by mouth daily 3/11/19  Yes Historical Provider, MD   Cranberry 1000 MG CAPS Take by mouth daily   Yes Historical Provider, MD   Multiple Vitamin (MULTIVITAMIN) tablet Take 1 tablet by mouth daily   Yes Historical Provider, MD   Probiotic Product (PROBIOTIC-10 PO) Take 1 tablet by mouth daily   Yes Historical Provider, MD   vitamin D (CHOLECALCIFEROL) 25 MCG (1000 UT) TABS tablet Take 1,000 Units by mouth daily   Yes Historical Provider, MD   vitamin C (ASCORBIC ACID) 500 MG tablet Take 1,000 mg by mouth Daily with lunch   Yes Historical Provider, MD   MAGNESIUM PO Take 400 mg by mouth Daily with supper    Yes Historical Provider, MD   aspirin (ASPIRIN 81) 81 MG EC tablet Take 81 mg by mouth daily Stopped for surgery   Yes Historical Provider, MD   polyethylene glycol (GLYCOLAX) 17 g packet Take 17 g by mouth daily   Yes Historical Provider, MD   acetaminophen (TYLENOL) 325 MG tablet Take 650 mg by mouth every 6 hours as needed for Pain   Yes Historical Provider, MD   carboxymethylcellulose 1 % ophthalmic solution 1 drop as needed for Dry Eyes   Yes Historical Provider, MD       Allergies:  Caduet [amlodipine-atorvastatin], Estrogens conjugated, Penicillins, and Terramycin [oxytetracycline-lidocaine]    Social History:  The patient currently lives at [facility]:   reports that she has never smoked. She has never used smokeless tobacco.  ETOH:   reports current alcohol use. Family History:  Reviewed in detail and negative for DM, Early CAD, Cancer, CVA. Positive as follows:    No family history on file. REVIEW OF SYSTEMS:   Positive for and as noted in the HPI. All other systems reviewed and negative. PHYSICAL EXAM:    BP (!) 158/82   Pulse 81   Temp 98.2 °F (36.8 °C) (Oral)   Resp 18   Ht 4' 11\" (1.499 m)   Wt 143 lb 11.8 oz (65.2 kg)   SpO2 92%   BMI 29.03 kg/m²     General appearance: No apparent distress appears stated age and cooperative. HEENT Normal cephalic, atraumatic without obvious deformity. Pupils equal, round, and reactive to light. Extra ocular muscles intact. Conjunctivae/corneas clear. Neck: Supple, No jugular venous distention/bruits. Trachea midline without thyromegaly or adenopathy with full range of motion. Lungs: Clear to auscultation, bilaterally without Rales/Wheezes/Rhonchi with good respiratory effort. Heart: Regular rate and rhythm with Normal S1/S2 without murmurs, rubs or gallops, point of maximum impulse non-displaced  Abdomen: Soft, non-tender or non-distended without rigidity or guarding and positive bowel sounds all four quadrants. Extremities: No clubbing, cyanosis, or edema bilaterally. Full range of motion without deformity and normal gait intact.   Skin: Skin color, Hospital Problems    Diagnosis Date Noted    Pulmonary embolism without acute cor pulmonale ([address] [I26.99] 12/13/2020         PHYSICIANS CERTIFICATION:    I certify that Krissy Ybarra is expected to be hospitalized for more than 2 midnights based on the following assessment and plan:      ASSESSMENT/PLAN:      Pulmonary embolism - unclear if provoked or unprovoked. Possibly provoked by recent procedure, great immobility per patient but not clear. COVID PCR test is pending.  -on heparin gtt  -heme/onc consulted, recs appreciated  -Echocardiogram pending  -LE Dopplers pending  -no evidence of right heart strain on CTA Chest with contrast and hemodynamically stable  -rapid COVID negative, PCR test is pending  -droplet plus precautions until PCR result returns  -warfarin vs DOAC at discharge, will confer with hematology    Essential hypertension  -continue home meds    Parkinson's disease  -continue home meds    Neurogenic bladder s/p suprapubic catheter  -no active issues    DVT Prophylaxis: heparin gtt  Diet: DIET GENERAL;  Code Status: Full Code  PT/OT Eval Status: ordered    Dispo - admit med/surg tele inpatient       Evone MD Barbara    Thank you Ambika Hall for the opportunity to be involved in this patient's care. If you have any questions or concerns please feel free to contact me at [phone].

## 2020-12-13 NOTE — ED TRIAGE NOTES
Patient came in from [facility] via EMS complaining of chest pain x1 day. States her chest started hurting in the morning and progressed throughout the da. Pain is 8/10 under her left breast and increases with inspiration. States she has bladder surgery last week. A&O x4.

## 2020-12-14 LAB
ANION GAP SERPL CALCULATED.3IONS-SCNC: 8 MMOL/L (ref 3–16)
APTT: 42.1 SEC (ref 24.2–36.2)
APTT: 55.4 SEC (ref 24.2–36.2)
APTT: 80.1 SEC (ref 24.2–36.2)
BASOPHILS ABSOLUTE: 0.1 K/UL (ref 0–0.2)
BASOPHILS RELATIVE PERCENT: 1 %
BUN BLDV-MCNC: 8 MG/DL (ref 7–20)
CALCIUM SERPL-MCNC: 8.4 MG/DL (ref 8.3–10.6)
CHLORIDE BLD-SCNC: 102 MMOL/L (ref 99–110)
CO2: 27 MMOL/L (ref 21–32)
CREAT SERPL-MCNC: <0.5 MG/DL (ref 0.6–1.2)
EOSINOPHILS ABSOLUTE: 0.1 K/UL (ref 0–0.6)
EOSINOPHILS RELATIVE PERCENT: 2.3 %
GFR AFRICAN AMERICAN: >60
GFR NON-AFRICAN AMERICAN: >60
GLUCOSE BLD-MCNC: 112 MG/DL (ref 70–99)
HCT VFR BLD CALC: 36.5 % (ref 36–48)
HEMOGLOBIN: 12.3 G/DL (ref 12–16)
LV EF: 60 %
LVEF MODALITY: NORMAL
LYMPHOCYTES ABSOLUTE: 1.4 K/UL (ref 1–5.1)
LYMPHOCYTES RELATIVE PERCENT: 23.6 %
MCH RBC QN AUTO: 30.2 PG (ref 26–34)
MCHC RBC AUTO-ENTMCNC: 33.6 G/DL (ref 31–36)
MCV RBC AUTO: 89.8 FL (ref 80–100)
MONOCYTES ABSOLUTE: 0.5 K/UL (ref 0–1.3)
MONOCYTES RELATIVE PERCENT: 7.8 %
NEUTROPHILS ABSOLUTE: 3.8 K/UL (ref 1.7–7.7)
NEUTROPHILS RELATIVE PERCENT: 65.3 %
PDW BLD-RTO: 14.6 % (ref 12.4–15.4)
PLATELET # BLD: 232 K/UL (ref 135–450)
PMV BLD AUTO: 6.9 FL (ref 5–10.5)
POTASSIUM REFLEX MAGNESIUM: 3.9 MMOL/L (ref 3.5–5.1)
RBC # BLD: 4.07 M/UL (ref 4–5.2)
SODIUM BLD-SCNC: 137 MMOL/L (ref 136–145)
WBC # BLD: 5.8 K/UL (ref 4–11)

## 2020-12-14 PROCEDURE — 93970 EXTREMITY STUDY: CPT

## 2020-12-14 PROCEDURE — 86146 BETA-2 GLYCOPROTEIN ANTIBODY: CPT

## 2020-12-14 PROCEDURE — 85025 COMPLETE CBC W/AUTO DIFF WBC: CPT

## 2020-12-14 PROCEDURE — 2500000003 HC RX 250 WO HCPCS: Performed by: INTERNAL MEDICINE

## 2020-12-14 PROCEDURE — 85730 THROMBOPLASTIN TIME PARTIAL: CPT

## 2020-12-14 PROCEDURE — 97116 GAIT TRAINING THERAPY: CPT

## 2020-12-14 PROCEDURE — 81241 F5 GENE: CPT

## 2020-12-14 PROCEDURE — 36415 COLL VENOUS BLD VENIPUNCTURE: CPT

## 2020-12-14 PROCEDURE — 1200000000 HC SEMI PRIVATE

## 2020-12-14 PROCEDURE — 86147 CARDIOLIPIN ANTIBODY EA IG: CPT

## 2020-12-14 PROCEDURE — 85613 RUSSELL VIPER VENOM DILUTED: CPT

## 2020-12-14 PROCEDURE — 97535 SELF CARE MNGMENT TRAINING: CPT

## 2020-12-14 PROCEDURE — 6360000002 HC RX W HCPCS: Performed by: FAMILY MEDICINE

## 2020-12-14 PROCEDURE — 2580000003 HC RX 258: Performed by: INTERNAL MEDICINE

## 2020-12-14 PROCEDURE — 97166 OT EVAL MOD COMPLEX 45 MIN: CPT

## 2020-12-14 PROCEDURE — 80048 BASIC METABOLIC PNL TOTAL CA: CPT

## 2020-12-14 PROCEDURE — 97530 THERAPEUTIC ACTIVITIES: CPT

## 2020-12-14 PROCEDURE — 97162 PT EVAL MOD COMPLEX 30 MIN: CPT

## 2020-12-14 PROCEDURE — 81240 F2 GENE: CPT

## 2020-12-14 PROCEDURE — 93306 TTE W/DOPPLER COMPLETE: CPT

## 2020-12-14 PROCEDURE — 6370000000 HC RX 637 (ALT 250 FOR IP): Performed by: INTERNAL MEDICINE

## 2020-12-14 PROCEDURE — 99221 1ST HOSP IP/OBS SF/LOW 40: CPT | Performed by: STUDENT IN AN ORGANIZED HEALTH CARE EDUCATION/TRAINING PROGRAM

## 2020-12-14 PROCEDURE — 85610 PROTHROMBIN TIME: CPT

## 2020-12-14 RX ORDER — HEPARIN SODIUM 1000 [USP'U]/ML
2100 INJECTION, SOLUTION INTRAVENOUS; SUBCUTANEOUS ONCE
Status: COMPLETED | OUTPATIENT
Start: 2020-12-14 | End: 2020-12-14

## 2020-12-14 RX ADMIN — SODIUM CHLORIDE, PRESERVATIVE FREE 10 ML: 5 INJECTION INTRAVENOUS at 21:31

## 2020-12-14 RX ADMIN — CARBIDOPA AND LEVODOPA 2 TABLET: 25; 100 TABLET ORAL at 10:44

## 2020-12-14 RX ADMIN — SODIUM CHLORIDE, PRESERVATIVE FREE 10 ML: 5 INJECTION INTRAVENOUS at 10:43

## 2020-12-14 RX ADMIN — HEPARIN SODIUM 9.4 ML/HR: 10000 INJECTION, SOLUTION INTRAVENOUS at 08:10

## 2020-12-14 RX ADMIN — BUSPIRONE HYDROCHLORIDE 7.5 MG: 7.5 TABLET ORAL at 21:02

## 2020-12-14 RX ADMIN — LISINOPRIL 5 MG: 5 TABLET ORAL at 10:48

## 2020-12-14 RX ADMIN — FAMOTIDINE 20 MG: 20 TABLET, FILM COATED ORAL at 21:02

## 2020-12-14 RX ADMIN — CARBIDOPA AND LEVODOPA 1 TABLET: 50; 200 TABLET, EXTENDED RELEASE ORAL at 21:30

## 2020-12-14 RX ADMIN — CARBIDOPA AND LEVODOPA 2 TABLET: 25; 100 TABLET ORAL at 12:47

## 2020-12-14 RX ADMIN — CARBIDOPA AND LEVODOPA 2 TABLET: 25; 100 TABLET ORAL at 17:43

## 2020-12-14 RX ADMIN — ASPIRIN 81 MG: 81 TABLET, COATED ORAL at 10:44

## 2020-12-14 RX ADMIN — FAMOTIDINE 20 MG: 20 TABLET, FILM COATED ORAL at 10:43

## 2020-12-14 RX ADMIN — CARBIDOPA AND LEVODOPA 2 TABLET: 25; 100 TABLET ORAL at 06:51

## 2020-12-14 RX ADMIN — CARBIDOPA AND LEVODOPA 2 TABLET: 25; 100 TABLET ORAL at 15:06

## 2020-12-14 RX ADMIN — HEPARIN SODIUM 2100 UNITS: 1000 INJECTION INTRAVENOUS; SUBCUTANEOUS at 12:47

## 2020-12-14 RX ADMIN — VERAPAMIL HYDROCHLORIDE 360 MG: 180 TABLET, FILM COATED, EXTENDED RELEASE ORAL at 10:48

## 2020-12-14 RX ADMIN — SERTRALINE HYDROCHLORIDE 50 MG: 50 TABLET ORAL at 10:44

## 2020-12-14 RX ADMIN — POLYETHYLENE GLYCOL 3350 17 G: 17 POWDER, FOR SOLUTION ORAL at 10:43

## 2020-12-14 ASSESSMENT — PAIN SCALES - GENERAL: PAINLEVEL_OUTOF10: 0

## 2020-12-14 ASSESSMENT — ENCOUNTER SYMPTOMS
BACK PAIN: 0
ABDOMINAL PAIN: 0
COUGH: 0
SHORTNESS OF BREATH: 1

## 2020-12-14 NOTE — PROGRESS NOTES
Clinical Pharmacy Note  Heparin Dosing       Lab Results   Component Value Date    APTT 55.4 12/14/2020     Lab Results   Component Value Date    HGB 11.7 12/13/2020    HCT 34.7 12/13/2020     12/13/2020       Current Infusion Rate: 8.4 mL/hr    Plan:  Rate: Continue 9.4 mL/hr  Next aPTT: 0800  12/14/20    Pharmacy will continue to monitor and adjust based on aPTT results.     Rossi Ervin PharmD  12/14/2020 5:13 AM

## 2020-12-14 NOTE — CARE COORDINATION
12/14 Patient is from [address] [place] [address]. Plan is to return to facility. Electronically signed by Galina Traylor RN on 12/14/2020 at 2:29 PM

## 2020-12-14 NOTE — CONSULTS
[facility]  Consultation Note    Chief Complaint / Reason for Consultation  Pulmonary Embolism, contraindication to anticoagulation    History of Present Illness  Patient is a 82 y.o. female presenting with acute right sided PE. The patient states that since Saturday evening into Sunday she has had intermittent shortness of breath and right sided pleuritic chest pain. She denies any history of clotting disorders nor family history. She does have a history of Parkinson's disease and apparently this limits her mobility and makes her prone to falls and accidents. She denies any fever or chills. She was tested for COVID which was negative. She denies any pain in her legs. She does have some edema. She has a suprapubic catheter in place that was recently replaced due to neurogenic bladder. Overall she is feeling better than prior to admission secondary to heparin initiation. Review of Systems  Review of Systems   Constitutional: Negative for activity change, chills and fever. HENT: Negative for congestion. Eyes: Negative for visual disturbance. Respiratory: Positive for shortness of breath. Negative for cough. Cardiovascular: Positive for chest pain and leg swelling. Negative for palpitations. Gastrointestinal: Negative for abdominal pain. Genitourinary: Positive for difficulty urinating. Musculoskeletal: Negative for back pain. Skin: Negative for wound. Neurological: Positive for tremors and weakness. Hematological: Does not bruise/bleed easily. Psychiatric/Behavioral: Negative for agitation and behavioral problems.         Past Medical History:   Diagnosis Date    Hiatal hernia     High blood pressure     Parkinson disease [address] [address]     Suprapubic catheter [facility])        Past Surgical History:   Procedure Laterality Date    ABDOMEN SURGERY      bowel obstruction    APPENDECTOMY  1948    CYSTOSCOPY N/A 12/3/2020    CYSTOSCOPY,  WITH INTRAVESICAL INJECTION OF BOTOX 200UNITS, WITH SUPRAPUBIC TUBE EXCHANGE performed by Bonnie Arce MD at [address] 62 63   R [facility] 56 SURGERY  2006    repair vagina bladder and rectum       Allergies   Allergen Reactions    Caduet [Amlodipine-Atorvastatin]     Estrogens Conjugated Other (See Comments)     lightheaded    Penicillins Rash    Terramycin [Oxytetracycline-Lidocaine] Rash       Social History     Socioeconomic History    Marital status:      Spouse name: Not on file    Number of children: Not on file    Years of education: Not on file    Highest education level: Not on file   Occupational History    Not on file   Social Needs    Financial resource strain: Not on file    Food insecurity     Worry: Not on file     Inability: Not on file    Transportation needs     Medical: Not on file     Non-medical: Not on file   Tobacco Use    Smoking status: Never Smoker    Smokeless tobacco: Never Used   Substance and Sexual Activity    Alcohol use: Yes     Comment: occasional    Drug use: No    Sexual activity: Not on file   Lifestyle    Physical activity     Days per week: Not on file     Minutes per session: Not on file    Stress: Not on file   Relationships    Social connections     Talks on phone: Not on file     Gets together: Not on file     Attends Church service: Not on file     Active member of club or organization: Not on file     Attends meetings of clubs or organizations: Not on file     Relationship status: Not on file    Intimate partner violence     Fear of current or ex partner: Not on file     Emotionally abused: Not on file     Physically abused: Not on file     Forced sexual activity: Not on file   Other Topics Concern    Not on file   Social History Narrative    Not on file       No family history on file.   - No history of bleeding or clotting disorders    Vital Signs  Vitals: 12/13/20 2004 12/14/20 0023 12/14/20 0422 12/14/20 1047   BP: 112/68 (!) 145/69 (!) 182/89 (!) 171/77   Pulse: 76 77 75 70   Resp: 18 18 18 17   Temp: 97.4 °F (36.3 °C) 98.2 °F (36.8 °C) 98 °F (36.7 °C) 97.9 °F (36.6 °C)   TempSrc: Oral Oral Oral Oral   SpO2: 92% 92% 95% 98%   Weight:   143 lb 8.3 oz (65.1 kg)    Height:           Physical Examination  General: No acute distress, on nasal cannula, conversant  Psychiatric: affect appropriate  Head/Eyes/Ears/Nose/Throat:  Atraumatic, vision and hearing intact, face symmetric  Neck:  supple  Chest/Lungs: short shallow breaths  Cardiac:  Regular rate and rhythm  Abdomen: soft, nontender  Extremities: warm and well perfused, mild edema-pitting  - bilateral upper extremity motorsensory intact  - bilateral lower extremity motorsensory intact  Vascular exam:  - R femoral: palp  - L femoral: palp  Skin: no wounds present    Labs  Lab Results   Component Value Date    WBC 5.8 12/14/2020    HGB 12.3 12/14/2020    HCT 36.5 12/14/2020    MCV 89.8 12/14/2020     12/14/2020     Lab Results   Component Value Date     12/14/2020    K 3.9 12/14/2020     12/14/2020    CO2 27 12/14/2020    BUN 8 12/14/2020    CREATININE <0.5 12/14/2020      No components found for: GLU    Imaging: CTA-PE  1. Right lung pulmonary emboli. 2. Small right pleural effusion with adjacent atelectasis. Assessment:   PE with concerns about anticoagulation      Plan: This is an 82year old female patient who presents with a right sided symptomatic PE. Due to her propensity for falls there is concern about full anticoagulation and its potential detrimental effect should she ever fall and hit her head. Would follow the results of the echocardiogram first and foremost and see how her heart is responding to the PE given that it is fairly sizeable.  If her right heart is strained then it might be worth considering pulmonary thrombectomy or EKOS to debulk the thrombus and then place filter

## 2020-12-14 NOTE — FLOWSHEET NOTE
Pt a&o x4 and follows commands. Pt ambulated to transport bed x2 with walker. Pt tolerated well. Pt expresses no needs at this time. Pt going down to vascular.  Electronically signed by Goldie Hassan RN on 12/14/2020 at 1:15 PM

## 2020-12-14 NOTE — PROGRESS NOTES
Hematology Oncology Daily Progress Note    Admit Date: 12/13/2020  Hospital day several    Subjective:     Patient has complaints of improved ADAIR/CP. Medication side effects: none    Scheduled Meds:   verapamil  360 mg Oral Daily    sertraline  50 mg Oral Daily    polyethylene glycol  17 g Oral Daily    lisinopril  5 mg Oral Daily    famotidine  20 mg Oral BID    busPIRone  7.5 mg Oral Nightly    aspirin  81 mg Oral Daily    sodium chloride flush  10 mL Intravenous 2 times per day    carbidopa-levodopa  1 tablet Oral Nightly    carbidopa-levodopa  2 tablet Oral 5x Daily     Continuous Infusions:   heparin (PORCINE) Infusion 9.4 mL/hr (12/14/20 0810)     PRN Meds:dextran 70-hypromellose, sodium chloride flush, ondansetron, polyethylene glycol, acetaminophen **OR** acetaminophen, carbidopa-levodopa, traMADol    Review of Systems  Pertinent items are noted in HPI. REVIEW OF SYSTEMS:         · Constitutional: Denies fever, sweats, weight loss     · Eyes: No visual changes or diplopia. No scleral icterus. · ENT: No Headaches, hearing loss or vertigo. No mouth sores or sore throat. · Cardiovascular: No chest pain, dyspnea on exertion, palpitations or loss of consciousness. · Respiratory: No cough or wheezing, no sputum production. No hemoptysis. .    · Gastrointestinal: No abdominal pain, appetite loss, blood in stools. No change in bowel habits. · Genitourinary: No dysuria, trouble voiding, or hematuria. · Musculoskeletal:  Generalized weakness. No joint complaints. · Integumentary: No rash or pruritis. · Neurological: No headache, diplopia. No change in gait, balance, or coordination. No paresthesias. · Endocrine: No temperature intolerance. No excessive thirst, fluid intake, or urination. · Hematologic/Lymphatic: No abnormal bruising or ecchymoses, blood clots or swollen lymph nodes. · Allergic/Immunologic: No nasal congestion or hives.    ·     Objective:     Patient Vitals for the past 8 hrs:   BP Temp Temp src Pulse Resp SpO2 Weight   12/14/20 0422 (!) 182/89 98 °F (36.7 °C) Oral 75 18 95 % 143 lb 8.3 oz (65.1 kg)     I/O last 3 completed shifts: In: 842.9 [P.O.:720; I.V.:122.9]  Out: 2450 [Urine:2450]  No intake/output data recorded. BP (!) 182/89   Pulse 75   Temp 98 °F (36.7 °C) (Oral)   Resp 18   Ht 4' 11\" (1.499 m)   Wt 143 lb 8.3 oz (65.1 kg)   SpO2 95%   BMI 28.99 kg/m²     General Appearance:    Alert, cooperative, no distress, appears stated age   Head:    Normocephalic, without obvious abnormality, atraumatic   Eyes:    PERRL, conjunctiva/corneas clear, EOM's intact, fundi     benign, both eyes        Ears:    Normal TM's and external ear canals, both ears   Nose:   Nares normal, septum midline, mucosa normal, no drainage    or sinus tenderness   Throat:   Lips, mucosa, and tongue normal; teeth and gums normal   Neck:   Supple, symmetrical, trachea midline, no adenopathy;        thyroid:  No enlargement/tenderness/nodules; no carotid    bruit or JVD   Back:     Symmetric, no curvature, ROM normal, no CVA tenderness   Lungs:     Clear to auscultation bilaterally, respirations unlabored   Chest wall:    No tenderness or deformity   Heart:    Regular rate and rhythm, S1 and S2 normal, no murmur, rub   or gallop   Abdomen:     Soft, non-tender, bowel sounds active all four quadrants,     no masses, no organomegaly           Extremities:   Extremities normal, atraumatic, no cyanosis or edema   Pulses:   2+ and symmetric all extremities   Skin:   Skin color, texture, turgor normal, no rashes or lesions   Lymph nodes:   Cervical, supraclavicular, and axillary nodes normal   Neurologic:   Stable         Data Review  CBC:   Lab Results   Component Value Date    WBC 5.8 12/14/2020    RBC 4.07 12/14/2020       Assessment:     Active Problems:    Pulmonary embolism without acute cor pulmonale (HCC)  Resolved Problems:    * No resolved hospital problems. *      Plan:     1.   Pulmonary embolus. This is likely provoked by her chronic immobility. I did send a hypercoag work-up. Typically I would send her out on Eliquis or Xarelto but she has frequent falls due to her Parkinson's. She frequently hits her head. Therefore, she will likely need a few more days of intravenous anticoagulation and an IVC filter placed.   Perhaps vascular surgery can weigh in.        Electronically signed by Kacy Kidd MD on 12/14/2020 at 9:44 AM

## 2020-12-14 NOTE — PROGRESS NOTES
Hospitalist Progress Note      PCP: Orquidea Liz    Date of Admission: 12/13/2020    Chief Complaint: Acute PE    Hospital Course:     Subjective: No chest pain or shortness of breath. Medications:  Reviewed    Infusion Medications    heparin (PORCINE) Infusion 10.4 mL/hr (12/14/20 1248)     Scheduled Medications    verapamil  360 mg Oral Daily    sertraline  50 mg Oral Daily    polyethylene glycol  17 g Oral Daily    lisinopril  5 mg Oral Daily    famotidine  20 mg Oral BID    busPIRone  7.5 mg Oral Nightly    aspirin  81 mg Oral Daily    sodium chloride flush  10 mL Intravenous 2 times per day    carbidopa-levodopa  1 tablet Oral Nightly    carbidopa-levodopa  2 tablet Oral 5x Daily     PRN Meds: perflutren lipid microspheres, dextran 70-hypromellose, sodium chloride flush, ondansetron, polyethylene glycol, acetaminophen **OR** acetaminophen, carbidopa-levodopa, traMADol      Intake/Output Summary (Last 24 hours) at 12/14/2020 1802  Last data filed at 12/14/2020 1043  Gross per 24 hour   Intake 250 ml   Output 1700 ml   Net -1450 ml       Exam:    /65   Pulse 80   Temp 98 °F (36.7 °C) (Axillary)   Resp 16   Ht 4' 11\" (1.499 m)   Wt 143 lb 8.3 oz (65.1 kg)   SpO2 96%   BMI 28.99 kg/m²     General appearance: No apparent distress, appears stated age and cooperative. HEENT: Pupils equal, round, and reactive to light. Conjunctivae/corneas clear. Neck: Supple, with full range of motion. No jugular venous distention. Trachea midline. Respiratory:  Normal respiratory effort. Clear to auscultation, bilaterally without Rales/Wheezes/Rhonchi. Cardiovascular: Regular rate and rhythm with normal S1/S2 without murmurs, rubs or gallops. Abdomen: Soft, non-tender, non-distended with normal bowel sounds. Musculoskeletal: No clubbing, cyanosis or edema bilaterally. Full range of motion without deformity. Skin: Skin color, texture, turgor normal.  No rashes or lesions.   Neurologic: Neurovascularly intact without any focal sensory/motor deficits. Cranial nerves: II-XII intact, grossly non-focal.  Psychiatric: Alert and oriented, thought content appropriate, normal insight  Capillary Refill: Brisk,< 3 seconds   Peripheral Pulses: +2 palpable, equal bilaterally       Labs:   Recent Labs     12/13/20 0055 12/13/20  0638 12/14/20  0903   WBC 7.6 6.9 5.8   HGB 12.5 11.7* 12.3   HCT 38.5 34.7* 36.5    239 232     Recent Labs     12/13/20 0055 12/13/20  0638 12/14/20  0903   * 131*  131* 137   K 4.0 4.0  4.3 3.9   CL 98* 97*  97* 102   CO2 23 23  23 27   BUN 16 11  11 8   CREATININE <0.5* <0.5*  <0.5* <0.5*   CALCIUM 8.3 7.7*  8.2* 8.4     Recent Labs     12/13/20 0055 12/13/20  0638   AST 15 12*   ALT <5* <5*   BILITOT 0.3 0.3   ALKPHOS 125 92     No results for input(s): INR in the last 72 hours. Recent Labs     12/13/20 0055 12/13/20  0638   TROPONINI <0.01 <0.01       Urinalysis:      Lab Results   Component Value Date    NITRU Negative 12/13/2020    BLOODU Negative 12/13/2020    SPECGRAV >1.030 12/13/2020    GLUCOSEU Negative 12/13/2020       Radiology:  VL Extremity Venous Bilateral         CT CHEST PULMONARY EMBOLISM W CONTRAST   Final Result   1. Right lung pulmonary emboli. 2. Small right pleural effusion with adjacent atelectasis. Findings were discussed with Dr. Destiny Jackson on 12/13/2020 at 2:35 a.m.         XR CHEST PORTABLE   Final Result   Mild perihilar vascular congestion without overt failure. Assessment/Plan:    Active Hospital Problems    Diagnosis Date Noted    Pulmonary embolism without acute cor pulmonale ([facility] [address] [I26.99] 12/13/2020        Pulmonary embolism - unclear if provoked or unprovoked. Possibly provoked by recent procedure, great immobility per patient but not clear.  COVID PCR test is pending.  -on heparin gtt  -heme/onc consulted, recs appreciated  -Echocardiogram pending  -LE Dopplers pending  -no evidence of right heart strain on CTA Chest with contrast and hemodynamically stable  -rapid COVID negative, PCR test is pending  -droplet plus precautions until PCR result returns  -warfarin vs DOAC at discharge, will confer with hematology     Essential hypertension  -continue home meds     Parkinson's disease  -continue home meds     Neurogenic bladder s/p suprapubic catheter  -no active issues    DVT Prophylaxis: Heparin drip  Diet: DIET GENERAL;  Code Status: Full Code    PT/OT Eval Status: SNF    Nicholas Diamond MD

## 2020-12-14 NOTE — PROGRESS NOTES
Occupational Therapy   Occupational Therapy Initial Assessment  Date: 2020   Patient Name: Ina Pratt  MRN: [id number]     : 1935    Date of Service: 2020    Discharge Recommendations:  3-5 sessions per week  OT Equipment Recommendations  Equipment Needed: No     Ina [place] scored a 16/24 on the AM-PAC ADL Inpatient form. Current research shows that an AM-PAC score of 17 or less is typically not associated with a discharge to the patient's home setting. Based on the patient's AM-PAC score and their current ADL deficits, it is recommended that the patient have 3-5 sessions per week of Occupational Therapy at d/c to increase the patient's independence. Please see assessment section for further patient specific details. If patient discharges prior to next session this note will serve as a discharge summary. Please see below for the latest assessment towards goals. Assessment   Performance deficits / Impairments: Decreased functional mobility ; Decreased safe awareness;Decreased balance;Decreased ADL status; Decreased endurance;Decreased strength  Assessment: Pt is a 83 yo female admitted to the hospital for Pulmonary Embolism and HTN with right sided chest pain. Pt lives in assisted living at [facility] in a private room recieving assist with IADLs, bathing (~5 days a week) and dressing ~3 days a week. Pt completes grooming and ambulates with 4WW throughout [facility] independently. Today, pt requires Max A for LE dressing, Mod A for toileting and transfers, and Min A for functional mobility with 4WW. Pt with decreased safety awareness and posterior lean with transfers. Pt would benefit from continued acute OT services and OT services 3-5x/week upon acute d/c.   Prognosis: Good  Decision Making: Medium Complexity  OT Education: OT Role;Plan of Care;Family Education  REQUIRES OT FOLLOW UP: Yes  Activity Tolerance  Activity Tolerance: Patient limited by fatigue;Patient Tolerated right pleural effusion with adjacent atelectasis\"  Family / Caregiver Present: Yes(Son)  Referring Practitioner: Lidia Diaz MD  Diagnosis: Pulmonary Embolism. HTN  Subjective  Subjective: Pt cooperative and agreeable to OT/PT session. General Comment  Comments: RN ok to tx. 2L 02  Patient Currently in Pain: No  Vital Signs  Temp: 98 °F (36.7 °C)  Temp Source: Axillary  Pulse: 80  Heart Rate Source: Monitor  Resp: 16  BP: 107/65  BP Location: Right Arm  MAP (mmHg): 79  Patient Currently in Pain: No  Oxygen Therapy  SpO2: 96 %  O2 Device: Nasal cannula  Social/Functional History  Social/Functional History  Lives With: ([facility]- 6 years. Own room. Assistive Living)  Type of Home: Assisted living  Bathroom Shower/Tub: Walk-in shower, Shower chair without back  Bathroom Toilet: Handicap height  Bathroom Equipment: Grab bars around toilet, Grab bars in shower  Bathroom Accessibility: Accessible  Home Equipment: 4 wheeled walker(All the time)  Receives Help From: Family(Assisted living)  ADL Assistance: Needs assistance(Assist with bathing always. Dressing about half the days a week from daughter and assisted livng. Has a cathetar at all times)  Homemaking Assistance: Needs assistance(Assisted living)  Homemaking Responsibilities: No  Ambulation Assistance: Independent(4WW)  Transfer Assistance: Independent(uses leg lifters)  Active : No  Occupation: Retired  Additional Comments: Adjustable bed. No O2 at home. 1 fall a week ago. 3-4 falls a month     Objective   Vision: Impaired  Vision Exceptions: Wears glasses at all times  Hearing: Within functional limits    Orientation  Overall Orientation Status: Within Normal Limits     Balance  Sitting Balance: Stand by assistance  Standing Balance:  Moderate assistance  Standing Balance  Time: 2 minutes  Activity: pt stood for toileting management and pericare with grab bar use  Functional Mobility  Functional - Mobility Device: 4-Wheeled Walker  Activity: To/from bathroom(bed>chair>bathroom)  Assist Level: Minimal assistance  Toilet Transfers  Toilet - Technique: Ambulating(4WW)  Equipment Used: Grab bars  Toilet Transfer: Moderate assistance  ADL  LE Dressing: Maximum assistance  Toileting: Moderate assistance(Assist with Pericare and steading)  Additional Comments: Anticipate Mod A with UE dressing and Max A bathing due to endurance, safety awareness and strength     Bed mobility  Supine to Sit: Minimal assistance(HOB elevated (uses at home))  Scooting: Contact guard assistance  Comment: Pt resting in chair with alarm placed, call light within reach and all needs met at EOS  Transfers  Sit to stand: Moderate assistance(4WW)  Stand to sit: Moderate assistance(4WW)     Cognition  Overall Cognitive Status: Exceptions  Arousal/Alertness: Appropriate responses to stimuli  Following Commands:  Follows all commands without difficulty  Attention Span: Appears intact  Memory: Appears intact  Safety Judgement: Decreased awareness of need for assistance  Problem Solving: Assistance required to generate solutions;Assistance required to implement solutions  Insights: Decreased awareness of deficits  Initiation: Requires cues for some  Sequencing: Requires cues for some     Sensation  Overall Sensation Status: WFL      LUE AROM (degrees)  LUE AROM : WFL  RUE AROM (degrees)  RUE AROM : WFL    Plan   Plan  Times per week: 3-5  Times per day: Daily  Current Treatment Recommendations: Strengthening, Safety Education & Training, Balance Training, Patient/Caregiver Education & Training, Self-Care / ADL, Functional Mobility Training, Endurance Training    AM-PAC Score        AM-[facility] Inpatient Daily Activity Raw Score: 16 (12/14/20 1611)  AM-PAC Inpatient ADL T-Scale Score : 35.96 (12/14/20 1611)  ADL Inpatient CMS 0-100% Score: 53.32 (12/14/20 1611)  ADL Inpatient CMS G-Code Modifier : CK (12/14/20 1611)    Goals  Short term goals  Time Frame for Short term goals: Prior to d/c  Short term goal 1: tolerate functional standing activity for 5 minutes  Short term goal 2: complete toileting with SBA  Short term goal 3: complete ADL transfers with SBA  Short term goal 4: complete LB with AE with SBA  Patient Goals   Patient goals : To return to assisted living       Therapy Time   Individual Concurrent Group Co-treatment   Time In 1513         Time Out 1606         Minutes 53         Timed Code Treatment Minutes: 38 Minutes     This note to serve as OT d/c summary if pt is d/c-ed prior to next therapy session.     MARY De Leon, OTR/L

## 2020-12-14 NOTE — PROGRESS NOTES
Physical Therapy  Attempt Note  Adine Dice    The pt out of the room at testing. Will attempt later if the pt is available; if not will see the pt tomorrow.    Electronically signed by Mateusz Jimenez, PT [id number] on 12/14/2020 at 2:44 PM

## 2020-12-14 NOTE — PROGRESS NOTES
Clinical Pharmacy Note  Heparin Dosing       Lab Results   Component Value Date    APTT 42.1 12/14/2020     Lab Results   Component Value Date    HGB 12.3 12/14/2020    HCT 36.5 12/14/2020     12/14/2020       Current Infusion Rate: 9.4 mL/hr    Plan:  Rate: Continue 10.4 mL/hr  Next aPTT: 1830 12/14/20    Pharmacy will continue to monitor and adjust based on aPTT results.     Valentine Estrada, PharmSKYLA  12/14/2020 12:16 PM

## 2020-12-14 NOTE — PROGRESS NOTES
Physical Therapy    Facility/Department: [address] MED SURG  Initial Assessment  If patient discharges prior to next session this note will serve as a discharge summary. Please see below for the latest assessment towards goals. NAME: Benito Neil  : 1935  MRN: [id number]    Date of Service: 2020    Discharge Recommendations:  Patient would benefit from continued therapy after discharge, 3-5 sessions per week   Benito Neil scored a 15/24 on the AM-PAC short mobility form. Current research shows that an AM-PAC score of 17 or less is typically not associated with a discharge to the patient's home setting. Based on the patient's AM-PAC score and their current functional mobility deficits, it is recommended that the patient have 3-5 sessions per week of Physical Therapy at d/c to increase the patient's independence. Please see assessment section for further patient specific details. PT Equipment Recommendations  Equipment Needed: No  Other: will monitor    Assessment   Assessment: The pt is an 83 yo female who came to the ED from the [facility] with chest pain. R sided pleuritic chest pain that is owrsened with deep breathing. CTA Chest with contrast showed right lung pulmonary emboli with small right pleural effusion with adjacent atelectasis. The pt recently had a suprapubic catheter placed due to neurogenic bladder. Doppler negative for DVT's. The pt lives at [facility] [address] and uses a rollator for all mobility. She does report numerous falls. PMHx: HTN, Parkinson's, suprapubic catheter, bowel resection     Today, the pt presnted as willing to work with therapy, she denies pain. She appears to be functioning below her reported baseline as she needed min A for bed mobility, mod A for transfers and min A for ambulation in the room with her rollator. She admits to frequent falls and would benefit from con't skilled PT at d/c to work on strnegthening, balance and progressive mobility.  Will con't Limits  Subjective  Subjective: the pt was found to already be sitting EOB with OT; the pt denies pain and was willing to work with therapy  Pain Screening  Patient Currently in Pain: No          Orientation  Orientation  Overall Orientation Status: Within Functional Limits  Social/Functional History  Social/Functional History  Lives With: ([facility]- 6 years. Own room. Assistive Living)  Type of Home: Assisted living  Bathroom Shower/Tub: Walk-in shower, Shower chair without back  Bathroom Toilet: Handicap height  Bathroom Equipment: Grab bars around toilet, Grab bars in shower  Bathroom Accessibility: Accessible  Home Equipment: 4 wheeled walker(All the time)  Receives Help From: Family(Assisted living)  ADL Assistance: Needs assistance(Assist with bathing always. Dressing about half the days a week from daughter and assisted livng. Has a cathetar at all times)  Homemaking Assistance: Needs assistance(Assisted living)  Homemaking Responsibilities: No  Ambulation Assistance: Independent(4WW)  Transfer Assistance: Independent(uses leg lifters)  Active : No  Occupation: Retired  Additional Comments: Adjustable bed. No O2 at home. 1 fall a week ago. 3-4 falls a month  Cognition   Cognition  Overall Cognitive Status: Exceptions  Arousal/Alertness: Appropriate responses to stimuli  Following Commands:  Follows all commands without difficulty  Attention Span: Appears intact  Memory: Appears intact  Safety Judgement: Decreased awareness of need for assistance  Problem Solving: Assistance required to generate solutions;Assistance required to implement solutions  Insights: Decreased awareness of deficits  Initiation: Requires cues for some  Sequencing: Requires cues for some    Objective          AROM RLE (degrees)  RLE General AROM: limited knee extension, other functional  AROM LLE (degrees)  LLE General AROM: limited knee extension, other functional  Strength RLE  Comment: functionally fair  Strength LLE  Comment: functionally fair        Bed mobility  Supine to Sit: Minimal assistance(HOB elevated (uses at home))  Scooting: Contact guard assistance  Transfers  Sit to Stand: Moderate Assistance  Stand to sit: Minimal Assistance  Ambulation  Ambulation?: Yes  Ambulation 1  Surface: level tile  Device: Rolling Walker  Other Apparatus: O2(O2 line and IV pole managed by therapist)  Assistance: Minimal assistance  Quality of Gait: slowed pace, posterior lean, fall risk, slightly impulsive with the rollator  Distance: 10 feet x 1, 25 feet x 1  Comments: the pt used the commode during session  Stairs/Curb  Stairs?: No     Balance  Posture: Fair  Sitting - Static: Fair  Sitting - Dynamic: Fair;-  Standing - Static: Fair;-  Standing - Dynamic: Fair(with rollator)  Comments: the pt has a posterior lean in static standing requiring at least min A and when in the chair she had a lean to the L requiring a pillow for support (the pt reports she always falls backward and she always leans to the L when sitting)        Plan   Plan  Times per week: 3-5x/week  Current Treatment Recommendations: Functional Mobility Training, Transfer Training, Gait Training, Strengthening  Safety Devices  Type of devices: Call light within reach, Chair alarm in place, Gait belt, Patient at risk for falls, Left in chair, Nurse notified      AM-PAC Score  AM-PAC Inpatient Mobility Raw Score : 15 (12/14/20 1601)  AM-PAC Inpatient T-Scale Score : 39.45 (12/14/20 1601)  Mobility Inpatient CMS 0-100% Score: 57.7 (12/14/20 1601)  Mobility Inpatient CMS G-Code Modifier : CK (12/14/20 1601)          Goals  Short term goals  Time Frame for Short term goals: upon d/c  Short term goal 1: Bed mobility with CGA. Short term goal 2: Transfer sit <> stand with CGA. Short term goal 3: Ambulate with rollator 50 feet with CGA.   Patient Goals   Patient goals : to go back to [facility] and get therapy       Therapy Time   Individual Concurrent Group Co-treatment   Time In  Time Out 1610         Minutes 38         Timed Code Treatment Minutes: 23 Minutes       Electronically signed by Manuel Chiu, PT [id number] on 12/14/2020 at 4:10 PM

## 2020-12-14 NOTE — PROGRESS NOTES
PCR covid came back non-detected. MD notified, okay to remove precautions.     Electronically signed by Ihsan Barba RN on 12/14/2020 at 11:55 AM

## 2020-12-15 LAB
ANION GAP SERPL CALCULATED.3IONS-SCNC: 10 MMOL/L (ref 3–16)
ANTICARDIOLIPIN IGG ANTIBODY: 6 GPL (ref 0–14)
APTT: 57.5 SEC (ref 24.2–36.2)
APTT: 57.8 SEC (ref 24.2–36.2)
APTT: 69.5 SEC (ref 24.2–36.2)
APTT: 77.1 SEC (ref 24.2–36.2)
BASOPHILS ABSOLUTE: 0 K/UL (ref 0–0.2)
BASOPHILS RELATIVE PERCENT: 0.7 %
BETA-2 GLYCOPROTEIN 1 IGG ANTIBODY: 0 SGU (ref 0–20)
BETA-2 GLYCOPROTEIN 1 IGM ANTIBODY: 2 SMU (ref 0–20)
BUN BLDV-MCNC: 13 MG/DL (ref 7–20)
CALCIUM SERPL-MCNC: 8.2 MG/DL (ref 8.3–10.6)
CARDIOLIPIN AB IGM: 19 MPL (ref 0–12)
CHLORIDE BLD-SCNC: 101 MMOL/L (ref 99–110)
CO2: 25 MMOL/L (ref 21–32)
CREAT SERPL-MCNC: <0.5 MG/DL (ref 0.6–1.2)
EOSINOPHILS ABSOLUTE: 0.2 K/UL (ref 0–0.6)
EOSINOPHILS RELATIVE PERCENT: 3.1 %
GFR AFRICAN AMERICAN: >60
GFR NON-AFRICAN AMERICAN: >60
GLUCOSE BLD-MCNC: 106 MG/DL (ref 70–99)
HCT VFR BLD CALC: 34 % (ref 36–48)
HEMOGLOBIN: 11.3 G/DL (ref 12–16)
LYMPHOCYTES ABSOLUTE: 2 K/UL (ref 1–5.1)
LYMPHOCYTES RELATIVE PERCENT: 34.3 %
MAGNESIUM: 2.3 MG/DL (ref 1.8–2.4)
MCH RBC QN AUTO: 30.3 PG (ref 26–34)
MCHC RBC AUTO-ENTMCNC: 33.3 G/DL (ref 31–36)
MCV RBC AUTO: 90.9 FL (ref 80–100)
MONOCYTES ABSOLUTE: 0.4 K/UL (ref 0–1.3)
MONOCYTES RELATIVE PERCENT: 7.4 %
NEUTROPHILS ABSOLUTE: 3.2 K/UL (ref 1.7–7.7)
NEUTROPHILS RELATIVE PERCENT: 54.5 %
PDW BLD-RTO: 14.4 % (ref 12.4–15.4)
PLATELET # BLD: 242 K/UL (ref 135–450)
PMV BLD AUTO: 7.1 FL (ref 5–10.5)
POTASSIUM REFLEX MAGNESIUM: 3.5 MMOL/L (ref 3.5–5.1)
RBC # BLD: 3.74 M/UL (ref 4–5.2)
SODIUM BLD-SCNC: 136 MMOL/L (ref 136–145)
WBC # BLD: 5.8 K/UL (ref 4–11)

## 2020-12-15 PROCEDURE — 2580000003 HC RX 258: Performed by: INTERNAL MEDICINE

## 2020-12-15 PROCEDURE — 97530 THERAPEUTIC ACTIVITIES: CPT

## 2020-12-15 PROCEDURE — 83735 ASSAY OF MAGNESIUM: CPT

## 2020-12-15 PROCEDURE — 80048 BASIC METABOLIC PNL TOTAL CA: CPT

## 2020-12-15 PROCEDURE — 2500000003 HC RX 250 WO HCPCS: Performed by: INTERNAL MEDICINE

## 2020-12-15 PROCEDURE — 85730 THROMBOPLASTIN TIME PARTIAL: CPT

## 2020-12-15 PROCEDURE — 97116 GAIT TRAINING THERAPY: CPT

## 2020-12-15 PROCEDURE — 85025 COMPLETE CBC W/AUTO DIFF WBC: CPT

## 2020-12-15 PROCEDURE — 36415 COLL VENOUS BLD VENIPUNCTURE: CPT

## 2020-12-15 PROCEDURE — 6370000000 HC RX 637 (ALT 250 FOR IP): Performed by: INTERNAL MEDICINE

## 2020-12-15 PROCEDURE — 1200000000 HC SEMI PRIVATE

## 2020-12-15 PROCEDURE — 99232 SBSQ HOSP IP/OBS MODERATE 35: CPT | Performed by: STUDENT IN AN ORGANIZED HEALTH CARE EDUCATION/TRAINING PROGRAM

## 2020-12-15 RX ADMIN — BUSPIRONE HYDROCHLORIDE 7.5 MG: 7.5 TABLET ORAL at 22:38

## 2020-12-15 RX ADMIN — HEPARIN SODIUM 8.4 ML/HR: 10000 INJECTION, SOLUTION INTRAVENOUS at 12:25

## 2020-12-15 RX ADMIN — SERTRALINE HYDROCHLORIDE 50 MG: 50 TABLET ORAL at 09:28

## 2020-12-15 RX ADMIN — CARBIDOPA AND LEVODOPA 2 TABLET: 25; 100 TABLET ORAL at 09:28

## 2020-12-15 RX ADMIN — VERAPAMIL HYDROCHLORIDE 360 MG: 180 TABLET, FILM COATED, EXTENDED RELEASE ORAL at 09:28

## 2020-12-15 RX ADMIN — CARBIDOPA AND LEVODOPA 2 TABLET: 25; 100 TABLET ORAL at 12:22

## 2020-12-15 RX ADMIN — ASPIRIN 81 MG: 81 TABLET, COATED ORAL at 09:28

## 2020-12-15 RX ADMIN — CARBIDOPA AND LEVODOPA 2 TABLET: 25; 100 TABLET ORAL at 15:34

## 2020-12-15 RX ADMIN — SODIUM CHLORIDE, PRESERVATIVE FREE 10 ML: 5 INJECTION INTRAVENOUS at 22:39

## 2020-12-15 RX ADMIN — POLYETHYLENE GLYCOL 3350 17 G: 17 POWDER, FOR SOLUTION ORAL at 09:28

## 2020-12-15 RX ADMIN — FAMOTIDINE 20 MG: 20 TABLET, FILM COATED ORAL at 22:38

## 2020-12-15 RX ADMIN — CARBIDOPA AND LEVODOPA 1 TABLET: 50; 200 TABLET, EXTENDED RELEASE ORAL at 22:38

## 2020-12-15 RX ADMIN — LISINOPRIL 5 MG: 5 TABLET ORAL at 09:28

## 2020-12-15 RX ADMIN — CARBIDOPA AND LEVODOPA 2 TABLET: 25; 100 TABLET ORAL at 06:14

## 2020-12-15 RX ADMIN — FAMOTIDINE 20 MG: 20 TABLET, FILM COATED ORAL at 09:28

## 2020-12-15 RX ADMIN — CARBIDOPA AND LEVODOPA 2 TABLET: 25; 100 TABLET ORAL at 18:21

## 2020-12-15 ASSESSMENT — PAIN SCALES - GENERAL
PAINLEVEL_OUTOF10: 0
PAINLEVEL_OUTOF10: 0

## 2020-12-15 NOTE — PROGRESS NOTES
[facility]) Vascular Surgery Progress Note       : SAJAN Umanzor is a 82 y.o. female patient. Subjective  Chest pain overnight  Appears resolved as of now  ECHO completed  No shortness of breath    1. Pulmonary embolus, right [facility])      Past Medical History:   Diagnosis Date    Hiatal hernia     High blood pressure     Parkinson disease ([address]     Suprapubic catheter (HCC)      No past surgical history pertinent negatives on file. Scheduled Meds:   verapamil  360 mg Oral Daily    sertraline  50 mg Oral Daily    polyethylene glycol  17 g Oral Daily    lisinopril  5 mg Oral Daily    famotidine  20 mg Oral BID    busPIRone  7.5 mg Oral Nightly    aspirin  81 mg Oral Daily    sodium chloride flush  10 mL Intravenous 2 times per day    carbidopa-levodopa  1 tablet Oral Nightly    carbidopa-levodopa  2 tablet Oral 5x Daily     Continuous Infusions:   heparin (PORCINE) Infusion 9.4 mL/hr (12/14/20 2102)     PRN Meds:perflutren lipid microspheres, dextran 70-hypromellose, sodium chloride flush, ondansetron, polyethylene glycol, acetaminophen **OR** acetaminophen, carbidopa-levodopa, traMADol    Active Problems:    Pulmonary embolism without acute cor pulmonale (HCC)  Resolved Problems:    * No resolved hospital problems. *    Blood pressure (!) 179/78, pulse 72, temperature 97.6 °F (36.4 °C), resp. rate 15, height 4' 11\" (1.499 m), weight 143 lb 1.3 oz (64.9 kg), SpO2 94 %. Objective:  Vital signs (most recent): Blood pressure (!) 179/78, pulse 72, temperature 97.6 °F (36.4 °C), resp. rate 15, height 4' 11\" (1.499 m), weight 143 lb 1.3 oz (64.9 kg), SpO2 94 %. General appearance: Comfortable and in no acute distress. Lungs:  Normal respiratory rate and normal effort. Heart: Normal rate. Chest: Asymmetric chest wall expansion. Extremities: There is normal range of motion. Neurological: The patient is alert.         Assessment & Plan    Noted that ECHO WNL  No need for pulmonary thrombectomy  Given patient's symptoms I do believe that [facility] is probably warranted  However willing to place IVC filter as stop-gap in case patient would have an issue while being on [facility] or this could be done at a later date      Kendy Sousa DO, [address] [facility] Vascular and Endovascular Surgery

## 2020-12-15 NOTE — PLAN OF CARE
Problem: Falls - Risk of:  Goal: Will remain free from falls  Description: Will remain free from falls  Outcome: Ongoing  Goal: Absence of physical injury  Description: Absence of physical injury  Outcome: Ongoing     Problem: Pain:  Goal: Patient's pain/discomfort is manageable  Description: Patient's pain/discomfort is manageable  Outcome: Ongoing  Goal: Pain level will decrease  Description: Pain level will decrease  Outcome: Ongoing  Goal: Control of acute pain  Description: Control of acute pain  Outcome: Ongoing  Goal: Control of chronic pain  Description: Control of chronic pain  Outcome: Ongoing

## 2020-12-15 NOTE — PROGRESS NOTES
Clinical Pharmacy Note  Heparin Dosing       Lab Results   Component Value Date    APTT 77.1 12/15/2020     Lab Results   Component Value Date    HGB 11.3 12/15/2020    HCT 34.0 12/15/2020     12/15/2020       Current Infusion Rate: 9.4 mL/hr    Plan:  Rate: Decrease to 8.4 mL/hr  Next aPTT: 1700  12/15/20    Pharmacy will continue to monitor and adjust based on aPTT results.   Becky Hardwick RP,12/15/2020,11:36 AM

## 2020-12-15 NOTE — FLOWSHEET NOTE
Pt A&O x4 and responds to commands. Pt complains of headache pain 2/10 and does not want anything for pain at this time. Pt resting comfortably in bed. Bedside table within reach. Call light within reach. Will continue to monitor.

## 2020-12-15 NOTE — PROGRESS NOTES
Hematology Oncology Daily Progress Note    Admit Date: 12/13/2020  Hospital day several    Subjective:     Patient has complaints of improved ADAIR and CP. Medication side effects: none    Scheduled Meds:   verapamil  360 mg Oral Daily    sertraline  50 mg Oral Daily    polyethylene glycol  17 g Oral Daily    lisinopril  5 mg Oral Daily    famotidine  20 mg Oral BID    busPIRone  7.5 mg Oral Nightly    aspirin  81 mg Oral Daily    sodium chloride flush  10 mL Intravenous 2 times per day    carbidopa-levodopa  1 tablet Oral Nightly    carbidopa-levodopa  2 tablet Oral 5x Daily     Continuous Infusions:   heparin (PORCINE) Infusion 9.4 mL/hr (12/14/20 2102)     PRN Meds:perflutren lipid microspheres, dextran 70-hypromellose, sodium chloride flush, ondansetron, polyethylene glycol, acetaminophen **OR** acetaminophen, carbidopa-levodopa, traMADol    Review of Systems  Pertinent items are noted in HPI. REVIEW OF SYSTEMS:         · Constitutional: Denies fever, sweats, weight loss     · Eyes: No visual changes or diplopia. No scleral icterus. · ENT: No Headaches, hearing loss or vertigo. No mouth sores or sore throat. · Cardiovascular: No chest pain, dyspnea on exertion, palpitations or loss of consciousness. · Respiratory: No cough or wheezing, no sputum production. No hemoptysis. .    · Gastrointestinal: No abdominal pain, appetite loss, blood in stools. No change in bowel habits. · Genitourinary: No dysuria, trouble voiding, or hematuria. · Musculoskeletal:  Generalized weakness. No joint complaints. · Integumentary: No rash or pruritis. · Neurological: No headache, diplopia. No change in gait, balance, or coordination. No paresthesias. · Endocrine: No temperature intolerance. No excessive thirst, fluid intake, or urination. · Hematologic/Lymphatic: No abnormal bruising or ecchymoses, blood clots or swollen lymph nodes. · Allergic/Immunologic: No nasal congestion or hives.    ·     Objective: Patient Vitals for the past 8 hrs:   BP Temp Temp src Pulse Resp SpO2 Weight   12/15/20 0927 109/65 98.3 °F (36.8 °C) Oral 74 16 95 % --   12/15/20 0546 -- -- -- -- -- -- 143 lb 1.3 oz (64.9 kg)   12/15/20 0423 (!) 179/78 97.6 °F (36.4 °C) -- 72 15 94 % --     I/O last 3 completed shifts: In: 841.6 [P.O.:360; I.V.:481.6]  Out: 1475 [Urine:1475]  No intake/output data recorded.     /65   Pulse 74   Temp 98.3 °F (36.8 °C) (Oral)   Resp 16   Ht 4' 11\" (1.499 m)   Wt 143 lb 1.3 oz (64.9 kg)   SpO2 95%   BMI 28.90 kg/m²     General Appearance:    Alert, cooperative, no distress, appears stated age   Head:    Normocephalic, without obvious abnormality, atraumatic   Eyes:    PERRL, conjunctiva/corneas clear, EOM's intact, fundi     benign, both eyes        Ears:    Normal TM's and external ear canals, both ears   Nose:   Nares normal, septum midline, mucosa normal, no drainage    or sinus tenderness   Throat:   Lips, mucosa, and tongue normal; teeth and gums normal   Neck:   Supple, symmetrical, trachea midline, no adenopathy;        thyroid:  No enlargement/tenderness/nodules; no carotid    bruit or JVD   Back:     Symmetric, no curvature, ROM normal, no CVA tenderness   Lungs:     Clear to auscultation bilaterally, respirations unlabored   Chest wall:    No tenderness or deformity   Heart:    Regular rate and rhythm, S1 and S2 normal, no murmur, rub   or gallop   Abdomen:     Soft, non-tender, bowel sounds active all four quadrants,     no masses, no organomegaly           Extremities:   Extremities normal, atraumatic, no cyanosis or edema   Pulses:   2+ and symmetric all extremities   Skin:   Skin color, texture, turgor normal, no rashes or lesions   Lymph nodes:   Cervical, supraclavicular, and axillary nodes normal   Neurologic:   Stable         Data Review  CBC:   Lab Results   Component Value Date    WBC 5.8 12/15/2020    RBC 3.74 12/15/2020       Assessment:     Active Problems:    Pulmonary

## 2020-12-15 NOTE — PROGRESS NOTES
last 72 hours. UA:No results for input(s): NITRITE, LABCAST, WBCUA, RBCUA, MUCUS in the last 72 hours. TROPONIN:   Recent Labs     12/13/20  0055 12/13/20  0638   TROPONINI <0.01 <0.01       Lab Results   Component Value Date/Time    URRFLXCULT Not Indicated 12/13/2020 04:48 AM       No results for input(s): TSHREFLEX in the last 72 hours. No components found for: AVF5199  POC GLUCOSE:  No results for input(s): POCGLU in the last 72 hours. No results for input(s): LABA1C in the last 72 hours. No results found for: LABA1C      ASSESSMENT AND PLAN  Pulmonary embolism -  Possibly provoked by recent procedure, COVID PCR test is pending.  -Echocardiogram pending  -LE Dopplers pending  -no evidence of right heart strain on CTA Chest with contrast and hemodynamically stable  -rapid COVID negative,   -droplet plus precautions until PCR result returns  Hematology consult is appreciated  They recommend heparin infusion for now and no anticoagulation on discharge  IVC filter is recommended  Patient has a history of frequent falls       Essential hypertension  -continue home meds     Parkinson's disease  -continue home meds     Neurogenic bladder s/p suprapubic catheter  -no active issues      Code Status: Full Code        Dispo -cc        The patient and / or the family were informed of the results of any tests, a time was given to answer questions, a plan was proposed and they agreed with plan. Pravin Howell MD    This note was transcribed using [id number]. Please disregard any translational errors.

## 2020-12-15 NOTE — PROGRESS NOTES
Clinical Pharmacy Note  Heparin Dosing       Lab Results   Component Value Date    APTT 69.5 12/15/2020     Lab Results   Component Value Date    HGB 11.3 12/15/2020    HCT 34.0 12/15/2020     12/15/2020       Current Infusion Rate: 9.4 mL/hr    Plan:  Rate: Continue 9.4 mL/hr  Next aPTT: 1000  12/15/20    Pharmacy will continue to monitor and adjust based on aPTT results.     Isha Marte, PharmD  12/15/2020 5:40 AM

## 2020-12-15 NOTE — PROGRESS NOTES
IVC filter Thursday Morning    Lien Nguyen DO, [address] [facility] Vascular and Endovascular Surgery

## 2020-12-15 NOTE — PROGRESS NOTES
Physical Therapy  Facility/Department: [address] MED SURG  Daily Treatment Note  NAME: Desirae Bonilla  : 1935  MRN: [id number]    Date of Service: 12/15/2020    Discharge Recommendations:  Patient would benefit from continued therapy after discharge, 3-5 sessions per week   PT Equipment Recommendations  Equipment Needed: No  Other: will monitor  Desirae Bonilla scored a 16/24 on the AM-PAC short mobility form. Current research shows that an AM-PAC score of 17 or less is typically not associated with a discharge to the patient's home setting. Based on the patient's AM-PAC score and their current functional mobility deficits, it is recommended that the patient have 3-5 sessions per week of Physical Therapy at d/c to increase the patient's independence. Please see assessment section for further patient specific details. If patient discharges prior to next session this note will serve as a discharge summary. Please see below for the latest assessment towards goals. Assessment   Body structures, Functions, Activity limitations: Decreased functional mobility ; Decreased safe awareness;Decreased endurance;Decreased coordination;Decreased balance  Assessment: The pt is an 83 yo female who came to the ED from the [facility] with chest pain. R sided pleuritic chest pain that is owrsened with deep breathing. CTA Chest with contrast showed right lung pulmonary emboli with small right pleural effusion with adjacent atelectasis. The pt recently had a suprapubic catheter placed due to neurogenic bladder. Doppler negative for DVT's. The pt lives at [address] [place] [address] and uses a rollator for all mobility. She does report numerous falls. PMHx: HTN, Parkinson's, suprapubic catheter, bowel resection     Today, the pt presnted as willing to work with therapy, she denies pain.  She appears to be functioning below her reported baseline as she needed min A for bed mobility, CGA-min A with increased time for transfers and min A for ambulation in the room with her rollator. One episode of moderate assistance due to LOB with safety concerns with O2 line management. She admits to frequent falls and would benefit from con't skilled PT at d/c to work on strnegthening, balance and progressive mobility. Will con't to follow on acute care. Prognosis: Good  PT Education: PT Role;General Safety;Goals;Transfer Training;Equipment;Orientation;Gait Training  Barriers to Learning: none  REQUIRES PT FOLLOW UP: Yes  Activity Tolerance  Activity Tolerance: Patient Tolerated treatment well  Activity Tolerance: poor safety insight     Patient Diagnosis(es): The encounter diagnosis was Pulmonary embolus, right ([address] has a past medical history of Hiatal hernia, High blood pressure, Parkinson disease ([address] and Suprapubic catheter ([address] has a past surgical history that includes Tonsillectomy and adenoidectomy (1943); Appendectomy (1948); Dilation and curettage of uterus (1959 60 62 63); Hysterectomy (1974); Vagina surgery (2006); Abdomen surgery; and Cystoscopy (N/A, 12/3/2020). Restrictions  Restrictions/Precautions  Restrictions/Precautions: Fall Risk  Position Activity Restriction  Other position/activity restrictions: camp, IV and O2 at 2L  Subjective   General  Chart Reviewed: Yes  Additional Pertinent Hx: Per H&P on 12- of Mario Angelo MD: The pt is an 83 yo female who came to the ED from the [facility] with chest pain. R sided pleuritic chest pain that is owrsened with deep breathing. CTA Chest with contrast showed right lung pulmonary emboli with small right pleural effusion with adjacent atelectasis. The pt recently had a suprapubic catheter placed due to neurogenic bladder. Doppler negative for DVT's. PMHx: HTN, Parkinson's, suprapubic catheter, bowel resection  Response To Previous Treatment: Patient with no complaints from previous session.   Family / Caregiver Present: No  Referring Practitioner: Mario Angelo MD  Subjective  Subjective: Pt sitting in recliner upon arrival to room. Pt without complaints at this time, eager to participate with therapy. Orientation  Orientation  Overall Orientation Status: Within Functional Limits    Objective   Bed mobility  Supine to Sit: Unable to assess  Sit to Supine: Unable to assess  Scooting: Unable to assess  Comment: OOB at this time, unable to perform at this time  Transfers  Sit to Stand: Contact guard assistance;Minimal Assistance(increased time to perform with posterior leaning pushing through chair with posterior aspect of posterior thighs)  Stand to sit: Contact guard assistance;Minimal Assistance(poor eccentric control, including one episodeof LOB backwards into couch with difficulty maintian O2 lines due to impulsive in nature)  Ambulation  Ambulation?: Yes  Ambulation 1  Surface: level tile  Device: Rollator  Other Apparatus: O2(2L for ambulation although patient pushing for removal, PT managed IV pole)  Assistance: Minimal assistance  Quality of Gait: slowed pace, posterior lean, fall risk, slightly impulsive with the rollator, festering gait with turns and retro with multiple small LOB posterior needing assistance for safety, impuslive with poor safety awareness with O2 line needing frequent readjustment. Distance: 61' wiht multiple turns X 2 trials  Comments: first ambulation with 2 L O2 >97% throughout without . Second ambulation on room air initially 85% at rest but quickly recovers to >90% with rest break. 2L replaced increased 98%, educated patient on importance of slow removal of O2 but poor understanding  Stairs/Curb  Stairs?: No     Balance  Posture: Fair  Sitting - Static: Good  Sitting - Dynamic: Good  Standing - Static: Fair  Standing - Dynamic: Fair  Comments: Sitting supervivision.  Standing due to frequent posterior leaning CGA_min assist for safety throughout with constant cues, mod assist one episode due to distracted with line needs safety assistance at this time. Exercises  Comments: Seated exercises X 15 each including LAQ and marching. Standing B heel raises X 10 and marching X 10 BLEs max cues and increased time to perform. AM-PAC Score  AM-PAC Inpatient Mobility Raw Score : 16 (12/15/20 1551)  AM-PAC Inpatient T-Scale Score : 40.78 (12/15/20 1551)  Mobility Inpatient CMS 0-100% Score: 54.16 (12/15/20 1551)  Mobility Inpatient CMS G-Code Modifier : CK (12/15/20 1551)          Goals  Short term goals  Time Frame for Short term goals: upon d/c  Short term goal 1: Bed mobility with CGA. Short term goal 2: Transfer sit <> stand with CGA. Short term goal 3: Ambulate with rollator 50 feet with CGA. Patient Goals   Patient goals : to go back to [facility] and get therapy    Plan    Plan  Times per week: 3-5x/week  Current Treatment Recommendations: Functional Mobility Training, Transfer Training, Gait Training, Strengthening  Safety Devices  Type of devices:  All fall risk precautions in place, Call light within reach, Chair alarm in place, Gait belt, Patient at risk for falls, Left in chair  Restraints  Initially in place: No     Therapy Time   Individual Concurrent Group Co-treatment   Time In 1505         Time Out 1550         Minutes 45         Timed Code Treatment Minutes: George Carbone, PT     Electronically signed by Sasha Deal PT on 12/15/20 at 3:53 PM EST

## 2020-12-15 NOTE — PLAN OF CARE
Problem: Falls - Risk of:  Goal: Will remain free from falls  Description: Will remain free from falls  12/15/2020 1746 by Stefanie Dumont RN  Outcome: Ongoing  Note: Pt free from falls this shift. Fall precautions in place at all times. Call light within reach. Pt able to and agreeable to contact for safety appropriately. Problem: Falls - Risk of:  Goal: Absence of physical injury  Description: Absence of physical injury  12/15/2020 1746 by Stefnaie Dumont RN  Outcome: Ongoing     Problem: Pain:  Goal: Patient's pain/discomfort is manageable  Description: Patient's pain/discomfort is manageable  12/15/2020 1746 by Stefanie Dumont RN  Outcome: Ongoing  Note: Pain/discomfort being managed with PRN analgesics per MD order.   Pt able to express presence and absence of pain and rate pain appropriately using numerical scale       Problem: Pain:  Goal: Pain level will decrease  Description: Pain level will decrease  12/15/2020 1746 by Stefanie Dumont RN  Outcome: Ongoing     Problem: Pain:  Goal: Control of chronic pain  Description: Control of chronic pain  12/15/2020 1746 by Stefanie Dumont RN  Outcome: Ongoing

## 2020-12-15 NOTE — PROGRESS NOTES
Clinical Pharmacy Note  Heparin Dosing       Lab Results   Component Value Date    APTT 80.1 12/14/2020     Lab Results   Component Value Date    HGB 12.3 12/14/2020    HCT 36.5 12/14/2020     12/14/2020       Current Infusion Rate: 10.4 mL/hr    Plan:  Bolus: 000 units  Rate: 9.4 mL/hr  Next aPTT: 0300    Pharmacy will continue to monitor and adjust based on aPTT results.

## 2020-12-16 LAB
ANION GAP SERPL CALCULATED.3IONS-SCNC: 12 MMOL/L (ref 3–16)
APTT: 60.9 SEC (ref 24.2–36.2)
BASOPHILS ABSOLUTE: 0.1 K/UL (ref 0–0.2)
BASOPHILS RELATIVE PERCENT: 0.9 %
BUN BLDV-MCNC: 10 MG/DL (ref 7–20)
CALCIUM SERPL-MCNC: 8.7 MG/DL (ref 8.3–10.6)
CHLORIDE BLD-SCNC: 97 MMOL/L (ref 99–110)
CO2: 24 MMOL/L (ref 21–32)
CREAT SERPL-MCNC: 0.5 MG/DL (ref 0.6–1.2)
EOSINOPHILS ABSOLUTE: 0.2 K/UL (ref 0–0.6)
EOSINOPHILS RELATIVE PERCENT: 2.8 %
GFR AFRICAN AMERICAN: >60
GFR NON-AFRICAN AMERICAN: >60
GLUCOSE BLD-MCNC: 126 MG/DL (ref 70–99)
HCT VFR BLD CALC: 37.8 % (ref 36–48)
HEMOGLOBIN: 12.6 G/DL (ref 12–16)
LYMPHOCYTES ABSOLUTE: 1.4 K/UL (ref 1–5.1)
LYMPHOCYTES RELATIVE PERCENT: 23.6 %
MCH RBC QN AUTO: 30.3 PG (ref 26–34)
MCHC RBC AUTO-ENTMCNC: 33.2 G/DL (ref 31–36)
MCV RBC AUTO: 91.3 FL (ref 80–100)
MONOCYTES ABSOLUTE: 0.3 K/UL (ref 0–1.3)
MONOCYTES RELATIVE PERCENT: 5.4 %
NEUTROPHILS ABSOLUTE: 4 K/UL (ref 1.7–7.7)
NEUTROPHILS RELATIVE PERCENT: 67.3 %
PDW BLD-RTO: 14.5 % (ref 12.4–15.4)
PLATELET # BLD: 292 K/UL (ref 135–450)
PMV BLD AUTO: 6.9 FL (ref 5–10.5)
POTASSIUM REFLEX MAGNESIUM: 4 MMOL/L (ref 3.5–5.1)
RBC # BLD: 4.14 M/UL (ref 4–5.2)
SODIUM BLD-SCNC: 133 MMOL/L (ref 136–145)
WBC # BLD: 6 K/UL (ref 4–11)

## 2020-12-16 PROCEDURE — 6370000000 HC RX 637 (ALT 250 FOR IP): Performed by: INTERNAL MEDICINE

## 2020-12-16 PROCEDURE — 36415 COLL VENOUS BLD VENIPUNCTURE: CPT

## 2020-12-16 PROCEDURE — 97530 THERAPEUTIC ACTIVITIES: CPT

## 2020-12-16 PROCEDURE — 85025 COMPLETE CBC W/AUTO DIFF WBC: CPT

## 2020-12-16 PROCEDURE — 2580000003 HC RX 258: Performed by: STUDENT IN AN ORGANIZED HEALTH CARE EDUCATION/TRAINING PROGRAM

## 2020-12-16 PROCEDURE — 2580000003 HC RX 258: Performed by: INTERNAL MEDICINE

## 2020-12-16 PROCEDURE — 80048 BASIC METABOLIC PNL TOTAL CA: CPT

## 2020-12-16 PROCEDURE — 85730 THROMBOPLASTIN TIME PARTIAL: CPT

## 2020-12-16 PROCEDURE — 1200000000 HC SEMI PRIVATE

## 2020-12-16 PROCEDURE — 2500000003 HC RX 250 WO HCPCS: Performed by: INTERNAL MEDICINE

## 2020-12-16 PROCEDURE — 97116 GAIT TRAINING THERAPY: CPT

## 2020-12-16 RX ORDER — SODIUM CHLORIDE 0.9 % (FLUSH) 0.9 %
10 SYRINGE (ML) INJECTION EVERY 12 HOURS SCHEDULED
Status: DISCONTINUED | OUTPATIENT
Start: 2020-12-16 | End: 2020-12-17 | Stop reason: HOSPADM

## 2020-12-16 RX ORDER — SODIUM CHLORIDE 0.9 % (FLUSH) 0.9 %
10 SYRINGE (ML) INJECTION PRN
Status: DISCONTINUED | OUTPATIENT
Start: 2020-12-16 | End: 2020-12-17 | Stop reason: HOSPADM

## 2020-12-16 RX ORDER — SODIUM CHLORIDE 9 MG/ML
INJECTION, SOLUTION INTRAVENOUS CONTINUOUS
Status: DISCONTINUED | OUTPATIENT
Start: 2020-12-16 | End: 2020-12-17 | Stop reason: HOSPADM

## 2020-12-16 RX ADMIN — FAMOTIDINE 20 MG: 20 TABLET, FILM COATED ORAL at 20:20

## 2020-12-16 RX ADMIN — CARBIDOPA AND LEVODOPA 2 TABLET: 25; 100 TABLET ORAL at 09:10

## 2020-12-16 RX ADMIN — VERAPAMIL HYDROCHLORIDE 360 MG: 180 TABLET, FILM COATED, EXTENDED RELEASE ORAL at 09:10

## 2020-12-16 RX ADMIN — ASPIRIN 81 MG: 81 TABLET, COATED ORAL at 09:10

## 2020-12-16 RX ADMIN — FAMOTIDINE 20 MG: 20 TABLET, FILM COATED ORAL at 09:10

## 2020-12-16 RX ADMIN — BUSPIRONE HYDROCHLORIDE 7.5 MG: 7.5 TABLET ORAL at 20:20

## 2020-12-16 RX ADMIN — SERTRALINE HYDROCHLORIDE 50 MG: 50 TABLET ORAL at 09:10

## 2020-12-16 RX ADMIN — CARBIDOPA AND LEVODOPA 2 TABLET: 25; 100 TABLET ORAL at 07:23

## 2020-12-16 RX ADMIN — SODIUM CHLORIDE, PRESERVATIVE FREE 10 ML: 5 INJECTION INTRAVENOUS at 11:40

## 2020-12-16 RX ADMIN — CARBIDOPA AND LEVODOPA 2 TABLET: 25; 100 TABLET ORAL at 18:21

## 2020-12-16 RX ADMIN — CARBIDOPA AND LEVODOPA 2 TABLET: 25; 100 TABLET ORAL at 16:49

## 2020-12-16 RX ADMIN — SODIUM CHLORIDE, PRESERVATIVE FREE 10 ML: 5 INJECTION INTRAVENOUS at 11:39

## 2020-12-16 RX ADMIN — CARBIDOPA AND LEVODOPA 1 TABLET: 50; 200 TABLET, EXTENDED RELEASE ORAL at 20:20

## 2020-12-16 RX ADMIN — LISINOPRIL 5 MG: 5 TABLET ORAL at 09:10

## 2020-12-16 RX ADMIN — SODIUM CHLORIDE: 9 INJECTION, SOLUTION INTRAVENOUS at 16:49

## 2020-12-16 RX ADMIN — HEPARIN SODIUM 8.4 ML/HR: 10000 INJECTION, SOLUTION INTRAVENOUS at 20:21

## 2020-12-16 RX ADMIN — CARBIDOPA AND LEVODOPA 2 TABLET: 25; 100 TABLET ORAL at 12:15

## 2020-12-16 ASSESSMENT — PAIN SCALES - GENERAL
PAINLEVEL_OUTOF10: 0
PAINLEVEL_OUTOF10: 0

## 2020-12-16 NOTE — ACP (ADVANCE CARE PLANNING)
ADVANCED CARE PLANNING    Sravanthi Ryan       :  1935              MRN:  [id number]      Purpose of Encounter: Advanced care planning in light of  PE  Parties in attendance: :Razia Stallings, Mirian Solano MD,   Patient Decisional Capacity: Yes    Subjective/Patient Story: Patient understands that the functional status continues to deteriorate. Patient No longer wishes to continue with the resuscitative measures in case  Of Cardiopulmonary arrest          Goals of Care Determinations: Patient wishes Not to use  the life support measures,   In case of cardiac arrest.      Code Status: At this time patient wishes to be DNR CCA    Time Spent on Advanced Planning Documents: > 15 minutes    Advanced Care Planning Documents: Completed advances directives have been completed ---No        Electronically signed by Mirian Solano MD on 12/15/2020 at 11:53 PM  Thank you Brice Soto for the opportunity to be involved in this patient's care. If you have any questions or concerns please feel free to contact me at [phone].

## 2020-12-16 NOTE — PROGRESS NOTES
Occupational Therapy  Facility/Department: [address] MED SURG  Daily Treatment Note  NAME: Don Ernst  : 1935  MRN: [id number]    Date of Service: 2020    Discharge Recommendations:  3-5 sessions per week       Assessment   Performance deficits / Impairments: Decreased functional mobility ; Decreased safe awareness;Decreased balance;Decreased ADL status; Decreased endurance;Decreased strength  Assessment: Pt tolerated session well. Pt did not have any LOB when walking forward. Pt with a LOB when stepping back. This was discussed with the pt and she reports she has lost her balance other times then when stepping back. Pt required cues for hand placement for transfers as she did not reach back and also did not lock her brakes. Pt would benefit from continued therapy after discharge with 3-5x/week as she has been having falls and demonstrates decreased safety. Prognosis: Good  Activity Tolerance  Activity Tolerance: Patient Tolerated treatment well  Safety Devices  Type of devices: Call light within reach; Chair alarm in place; Left in chair;Gait belt         Patient Diagnosis(es): The encounter diagnosis was Pulmonary embolus, right ([address] has a past medical history of Hiatal hernia, High blood pressure, Parkinson disease ([address] and Suprapubic catheter ([address] has a past surgical history that includes Tonsillectomy and adenoidectomy (); Appendectomy (); Dilation and curettage of uterus ( 60 62 63); Hysterectomy (); Vagina surgery (); Abdomen surgery; and Cystoscopy (N/A, 12/3/2020).     Restrictions  Restrictions/Precautions  Restrictions/Precautions: Fall Risk  Position Activity Restriction  Other position/activity restrictions: camp, IV and O2 at 2L  Subjective   General  Chart Reviewed: Yes, Progress Notes  Patient assessed for rehabilitation services?: Yes  Additional Pertinent Hx: Per H&P \"The patient is a 82 y.o. female with hx HTN, Parkinson's Disease with neurogenic onto the rollator and did not lock the brakes. Pt reports she usually locks brakes at home prior to sitting. Bed mobility  Comment: Pt up in the recliner at the beginning and end of the session. Transfers  Sit to stand: Contact guard assistance  Stand to sit: Contact guard assistance  Transfer Comments: cues for locking the brakes. Plan   Plan  Times per week: 3-5  Times per day: Daily  Current Treatment Recommendations: Strengthening, Safety Education & Training, Balance Training, Patient/Caregiver Education & Training, Self-Care / ADL, Functional Mobility Training, Endurance Training  AM-PAC Score        AM-[facility] Inpatient Daily Activity Raw Score: 16 (12/16/20 1156)  AM-PAC Inpatient ADL T-Scale Score : 35.96 (12/16/20 1156)  ADL Inpatient CMS 0-100% Score: 53.32 (12/16/20 1156)  ADL Inpatient CMS G-Code Modifier : CK (12/16/20 1156)    Goals  Short term goals  Time Frame for Short term goals: Prior to d/c  Short term goal 1: tolerate functional standing activity for 5 minutes- goal met  Short term goal 2: complete toileting with SBA  Short term goal 3: complete ADL transfers with SBA  Short term goal 4: complete LB with AE with SBA  Patient Goals   Patient goals : To return to assisted living       Therapy Time   Individual Concurrent Group Co-treatment   Time In 1120         Time Out 1155         Minutes 35              This note to serve as OT d/c summary if pt is d/c-ed from hospital prior to next OT session.     Peggy Doan, [address]

## 2020-12-16 NOTE — PROGRESS NOTES
Clinical Pharmacy Note  Heparin Dosing       Lab Results   Component Value Date    APTT 57.8 12/15/2020     Lab Results   Component Value Date    HGB 11.3 12/15/2020    HCT 34.0 12/15/2020     12/15/2020       Current Infusion Rate: 8.4 mL/hr    Plan:  Continue same rate: 8.4 mL/hr  Next aPTT: 12/15/20 2300    Pharmacy will continue to monitor and adjust based on aPTT results.   Ralphe Face, [address] [facility]

## 2020-12-16 NOTE — PROGRESS NOTES
Hospitalist Progress Note  12/16/2020 10:22 AM    PCP: Camryn Pérez    [id number]     Date of Admission: 12/13/2020                                                                                                                     HOSPITAL COURSE    Patient demographics:  The patient  Landy Mcgrath is a 82 y.o. female     Significant past medical history:   Patient Active Problem List   Diagnosis    Pulmonary embolism without acute cor pulmonale (HCC)         Presenting symptoms:  Chest pain    Diagnostic workup:      CONSULTS DURING ADMISSION :   IP CONSULT TO ONCOLOGY  IP CONSULT TO VASCULAR SURGERY  IP CONSULT TO VASCULAR SURGERY  IP CONSULT TO SOCIAL WORK      Patient was diagnosed with:        Treatment while inpatient:  Pt presented to [facility] with chest pain.                                                                                        ----------------------------------------------------------      SUBJECTIVE COMPLAINTS- follow up for Chest pain    Diet: DIET GENERAL;  Diet NPO Time Specified Exceptions are: Sips with Meds      OBJECTIVE:   Patient Active Problem List   Diagnosis    Pulmonary embolism without acute cor pulmonale (HCC)       Allergies  Caduet [amlodipine-atorvastatin], Estrogens conjugated, Penicillins, and Terramycin [oxytetracycline-lidocaine]    Medications    Scheduled Meds:   sodium chloride flush  10 mL Intravenous 2 times per day    verapamil  360 mg Oral Daily    sertraline  50 mg Oral Daily    polyethylene glycol  17 g Oral Daily    lisinopril  5 mg Oral Daily    famotidine  20 mg Oral BID    busPIRone  7.5 mg Oral Nightly    aspirin  81 mg Oral Daily    sodium chloride flush  10 mL Intravenous 2 times per day    carbidopa-levodopa  1 tablet Oral Nightly    carbidopa-levodopa  2 tablet Oral 5x Daily     Continuous Infusions:   sodium chloride      heparin (PORCINE) Infusion 8.4 mL/hr (12/15/20 1225)     PRN Meds:  sodium chloride flush, perflutren lipid microspheres, dextran 70-hypromellose, sodium chloride flush, ondansetron, polyethylene glycol, acetaminophen **OR** acetaminophen, carbidopa-levodopa, traMADol    Vitals   Vitals /wt   Patient Vitals for the past 8 hrs:   BP Temp Temp src Pulse Resp SpO2 Weight   12/16/20 0909 134/73 97.8 °F (36.6 °C) Oral 75 18 96 % --   12/16/20 0423 (!) 157/75 97.7 °F (36.5 °C) Oral 65 18 95 % 140 lb 14 oz (63.9 kg)        72HR INTAKE/OUTPUT:      Intake/Output Summary (Last 24 hours) at 12/16/2020 1022  Last data filed at 12/16/2020 0919  Gross per 24 hour   Intake 1119.06 ml   Output 2225 ml   Net -1105.94 ml       Exam:    Gen:   Alert and oriented ×3  Eyes: PERRL. No sclera icterus. No conjunctival injection. ENT: No discharge. Pharynx clear. External appearance of ears and nose normal.  Neck: Trachea midline. No obvious mass. Resp: No accessory muscle use. No crackles. No wheezes. No rhonchi. CV: Regular rate. Regular rhythm. No murmur or rub. No edema. GI: Non-tender. Non-distended. No hernia. Skin: Warm, dry, normal texture and turgor. Lymph: No cervical LAD. No supraclavicular LAD. M/S: / Ext. No cyanosis. No clubbing. No joint deformity. Neuro: CN 2-12 are intact,  no neurologic deficits noted. PT/INR: No results for input(s): PROTIME, INR in the last 72 hours. APTT:   Recent Labs     12/15/20  0928 12/15/20  1633 12/15/20  2208 12/16/20  0939   APTT 77.1* 57.8* 57.5* 60.9*       CBC:   Recent Labs     12/14/20  0903 12/15/20  0348 12/16/20  0939   WBC 5.8 5.8 6.0   HGB 12.3 11.3* 12.6   HCT 36.5 34.0* 37.8   MCV 89.8 90.9 91.3    242 292       BMP:   Recent Labs     12/14/20  0903 12/15/20  0348    136   K 3.9 3.5    101   CO2 27 25   BUN 8 13   CREATININE <0.5* <0.5*       LIVER PROFILE:   No results for input(s): ALKPHOS, AST, ALT, ALB, BILIDIR, BILITOT, ALKPHOS in the last 72 hours. No results for input(s): AMYLASE in the last 72 hours.   No results for input(s): LIPASE in the last 72 hours. UA:No results for input(s): NITRITE, LABCAST, WBCUA, RBCUA, MUCUS in the last 72 hours. TROPONIN:   No results for input(s): Monique Beat in the last 72 hours. Lab Results   Component Value Date/Time    URRFLXCULT Not Indicated 12/13/2020 04:48 AM       No results for input(s): TSHREFLEX in the last 72 hours. No components found for: [id number]  POC GLUCOSE:  No results for input(s): POCGLU in the last 72 hours. No results for input(s): LABA1C in the last 72 hours. No results found for: LABA1C      ASSESSMENT AND PLAN  Pulmonary embolism   No evidence of DVT on venous Doppler  Possibly provoked by recent procedure, COVID PCR test negative  Echocardiogram normal ejection fraction with grade 1 diastolic dysfunction  Hematology oncology consult is appreciated  Patient will get IVC filter on 12/17  Hematology oncology following       Essential hypertension  -continue home meds     Parkinson's disease  -continue home meds     Neurogenic bladder s/p suprapubic catheter  -no active issues      Code Status: Full Code        Dispo -cc        The patient and / or the family were informed of the results of any tests, a time was given to answer questions, a plan was proposed and they agreed with plan. Juan Antonio Aguilar MD    This note was transcribed using [id number] [employer]. Please disregard any translational errors.

## 2020-12-16 NOTE — PROGRESS NOTES
Physician Progress Note      PATIENT:               Christiano Hall  CSN #:                  [id number]  :                       1935  ADMIT DATE:       2020 12:33 AM  DISCH DATE:  RESPONDING  PROVIDER #:        Marguerite Morrison MD          QUERY TEXT:    Pt admitted with Pulmonary Emboli. Pt noted to have had recent urologic   procedure with cystoscopy/Botox for her neurogenic bladder and Suprapubic   catheter placement. If possible, please document in progress notes and   discharge summary:    The medical record reflects the following:  Risk Factors: Parkinson, and recent urologic procedure with cystoscopy/Botox   for her neurogenic bladder and Suprapubic catheter placement  Clinical Indicators: per H&P \"Pulmonary embolism - unclear if provoked or   unprovoked. Possibly provoked by recent procedure, great immobility per   patient but not clear\"  Treatment: Heparin infusion, Vascular surgery consult and monitoring  Options provided:  -- Pulmonary Emboli as an postoperative complication  -- Pulmonary Emboli as an expected/inherent condition that occurred   postoperatively and not a complication  -- Pulmonary Emboli related to other incidental risk factor (please specify)   occurring following surgery and not a complication, Please document incidental   risk factor. -- Other - I will add my own diagnosis  -- Disagree - Not applicable / Not valid  -- Disagree - Clinically unable to determine / Unknown  -- Refer to Clinical Documentation Reviewer    PROVIDER RESPONSE TEXT:    Patient has Pulmonary Emboli as a postoperative complication.     Query created by: Deann Grissom on 2020 2:45 PM      Electronically signed by:  Marguerite Morrison MD 2020 11:44 AM

## 2020-12-16 NOTE — PROGRESS NOTES
IVC filter placement tomorrow  NPO after midnight  Risks and benefits discussed with patient  Did discuss that if IVC too large then would be unable to place    Grisel Prabhakar DO, [address] [facility] End[facility]

## 2020-12-16 NOTE — PLAN OF CARE
Problem: Falls - Risk of:  Goal: Will remain free from falls  Description: Will remain free from falls  12/16/2020 0245 by Leonel Kauffman RN  Outcome: Ongoing  12/15/2020 1746 by Neva Pedroza RN  Outcome: Ongoing  Note: Pt free from falls this shift. Fall precautions in place at all times. Call light within reach. Pt able to and agreeable to contact for safety appropriately. Goal: Absence of physical injury  Description: Absence of physical injury  12/16/2020 0245 by Leonel Kauffman RN  Outcome: Ongoing  12/15/2020 1746 by Neva Pedroza RN  Outcome: Ongoing     Problem: Pain:  Goal: Patient's pain/discomfort is manageable  Description: Patient's pain/discomfort is manageable  12/16/2020 0245 by Leonel Kauffman RN  Outcome: Ongoing  12/15/2020 1746 by Neva Pedorza RN  Outcome: Ongoing  Note: Pain/discomfort being managed with PRN analgesics per MD order.   Pt able to express presence and absence of pain and rate pain appropriately using numerical scale    Goal: Pain level will decrease  Description: Pain level will decrease  12/16/2020 0245 by Leonel Kauffman RN  Outcome: Ongoing  12/15/2020 1746 by Neva Pedroza RN  Outcome: Ongoing  Goal: Control of acute pain  Description: Control of acute pain  12/16/2020 0245 by Leonel Kauffman RN  Outcome: Ongoing  12/15/2020 1746 by Neva Pedroza RN  Outcome: Ongoing  Goal: Control of chronic pain  Description: Control of chronic pain  12/16/2020 0245 by Leonel Kauffman RN  Outcome: Ongoing  12/15/2020 1746 by Neva Pedroza RN  Outcome: Ongoing

## 2020-12-16 NOTE — PROGRESS NOTES
Clinical Pharmacy Note  Heparin Dosing       Lab Results   Component Value Date    APTT 60.9 12/16/2020     Lab Results   Component Value Date    HGB 12.6 12/16/2020    HCT 37.8 12/16/2020     12/16/2020       Current Infusion Rate: 8.4 mL/hr    Plan:  Rate: Continue  8.4 mL/hr  Next aPTT: 0600  12/17/20    Pharmacy will continue to monitor and adjust based on aPTT results.     Heath Verduzco, [address] 12/16/2020 10:28 AM

## 2020-12-16 NOTE — PROGRESS NOTES
disease ([facility] and Suprapubic catheter ([facility] has a past surgical history that includes Tonsillectomy and adenoidectomy (1943); Appendectomy (1948); Dilation and curettage of uterus (1959 60 62 63); Hysterectomy (1974); Vagina surgery (2006); Abdomen surgery; and Cystoscopy (N/A, 12/3/2020). Restrictions  Restrictions/Precautions  Restrictions/Precautions: Fall Risk  Position Activity Restriction  Other position/activity restrictions: IV, 2L O2     Subjective  General  Chart Reviewed: Yes  Additional Pertinent Hx: Per H&P on 12- of Ness Null MD: The pt is an 81 yo female who came to the ED from the [facility] with chest pain. R sided pleuritic chest pain that is owrsened with deep breathing. CTA Chest with contrast showed right lung pulmonary emboli with small right pleural effusion with adjacent atelectasis. The pt recently had a suprapubic catheter placed due to neurogenic bladder. Doppler negative for DVT's. PMHx: HTN, Parkinson's, suprapubic catheter, bowel resection  Response To Previous Treatment: Patient with no complaints from previous session. Family / Caregiver Present: No  Referring Practitioner: Ness Null MD  Subjective  Subjective: Pt sitting in recliner upon arrival to room. Pt without complaints at this time, eager to participate with therapy. Social/Functional History  Social/Functional History  Lives With: ([facility]- 6 years. Own room. Assistive Living)  Type of Home: Assisted living  Bathroom Shower/Tub: Walk-in shower, Shower chair without back  Bathroom Toilet: Handicap height  Bathroom Equipment: Grab bars around toilet, Grab bars in shower  Bathroom Accessibility: Accessible  Home Equipment: 4 wheeled walker(All the time)  Receives Help From: Family(Assisted living)  ADL Assistance: Needs assistance(Assist with bathing always. Dressing about half the days a week from daughter and assisted livng.  Has a cathetar at all times)  Homemaking Assistance: Needs assistance(Assisted living)  Homemaking Responsibilities: No  Ambulation Assistance: Independent(4WW)  Transfer Assistance: Independent(uses leg lifters)  Active : No  Occupation: Retired  Additional Comments: Adjustable bed. No O2 at home. 1 fall a week ago. 3-4 falls a month    Objective  Bed mobility  Comment: Pt up in the recliner at the beginning and end of the session. Transfers  Sit to Stand: Contact guard assistance  Stand to sit: Contact guard assistance  Comment: the pt has an inital posterior lean when 1st standing requiring at least CGA to get her midline balance  Ambulation  Ambulation?: Yes  Ambulation 1  Surface: level tile  Device: Rollator  Other Apparatus: O2(O2 and IV pole managed by therapist)  Assistance: Contact guard assistance  Quality of Gait: moderate pace, no LOB, has a forward flexed posture with the rollator, shuffled steps with shortened steps length B, decreased awareness of multiple lines with turning  Distance: 125 feet and then 25 feet in the room  Comments: the pt on 2L O2 and SpO2 levels remained 95> 98% thru-out; the pt used the commode during session for a BM; needed assist for yair-care and depends change (the pt had a small BM and RN was notified)  Stairs/Curb  Stairs?: No     Balance  Posture: Fair  Sitting - Static: Good  Sitting - Dynamic: Good;-  Standing - Static: Fair  Standing - Dynamic: Fair  Comments: Sitting supervision. Static standing with CGA due to posterior lean        Plan   Plan  Times per week: 3-5x/week  Current Treatment Recommendations: Functional Mobility Training, Transfer Training, Gait Training, Strengthening  Safety Devices  Type of devices:  All fall risk precautions in place, Call light within reach, Chair alarm in place, Gait belt, Patient at risk for falls, Left in chair  Restraints  Initially in place: No      AM-PAC Score  AM-PAC Inpatient Mobility Raw Score : 17 (12/16/20 1548)  AM-PAC Inpatient T-Scale Score : 42.13 (12/16/20 1548)  Mobility Inpatient CMS 0-100% Score: 50.57 (12/16/20 1548)  Mobility Inpatient CMS G-Code Modifier : CK (12/16/20 1548)          Goals  Short term goals  Time Frame for Short term goals: upon d/c  Short term goal 1: Bed mobility with CGA.   Short term goal 2: Transfer sit <> stand with CGA. (met)  Short term goal 3: Ambulate with rollator 50 feet with CGA. (met)  Patient Goals   Patient goals : to go back to [facility] and get therapy       Therapy Time   Individual Concurrent Group Co-treatment   Time In 1513         Time Out 1600         Minutes 47                 Electronically signed by Susan Lenz, PT [id number] on 12/16/2020 at 4:01 PM

## 2020-12-16 NOTE — PROGRESS NOTES
Hematology Oncology Daily Progress Note    Admit Date: 12/13/2020  Hospital day several    Subjective:     Patient has complaints of improved pleuritic CP--essentially resolved  Medication side effects: none    Scheduled Meds:   sodium chloride flush  10 mL Intravenous 2 times per day    verapamil  360 mg Oral Daily    sertraline  50 mg Oral Daily    polyethylene glycol  17 g Oral Daily    lisinopril  5 mg Oral Daily    famotidine  20 mg Oral BID    busPIRone  7.5 mg Oral Nightly    aspirin  81 mg Oral Daily    sodium chloride flush  10 mL Intravenous 2 times per day    carbidopa-levodopa  1 tablet Oral Nightly    carbidopa-levodopa  2 tablet Oral 5x Daily     Continuous Infusions:   sodium chloride      heparin (PORCINE) Infusion 8.4 mL/hr (12/15/20 1225)     PRN Meds:sodium chloride flush, perflutren lipid microspheres, dextran 70-hypromellose, sodium chloride flush, ondansetron, polyethylene glycol, acetaminophen **OR** acetaminophen, carbidopa-levodopa, traMADol    Review of Systems  Pertinent items are noted in HPI. REVIEW OF SYSTEMS:         · Constitutional: Denies fever, sweats, weight loss     · Eyes: No visual changes or diplopia. No scleral icterus. · ENT: No Headaches, hearing loss or vertigo. No mouth sores or sore throat. · Cardiovascular: No chest pain, dyspnea on exertion, palpitations or loss of consciousness. · Respiratory: No cough or wheezing, no sputum production. No hemoptysis. .    · Gastrointestinal: No abdominal pain, appetite loss, blood in stools. No change in bowel habits. · Genitourinary: No dysuria, trouble voiding, or hematuria. · Musculoskeletal:  Generalized weakness. No joint complaints. · Integumentary: No rash or pruritis. · Neurological: No headache, diplopia. No change in gait, balance, or coordination. No paresthesias. · Endocrine: No temperature intolerance. No excessive thirst, fluid intake, or urination.    · Hematologic/Lymphatic: No abnormal bruising or ecchymoses, blood clots or swollen lymph nodes. · Allergic/Immunologic: No nasal congestion or hives. ·     Objective:     Patient Vitals for the past 8 hrs:   BP Temp Temp src Pulse Resp SpO2   12/16/20 1356 121/62 98.4 °F (36.9 °C) Oral 76 16 97 %   12/16/20 0909 134/73 97.8 °F (36.6 °C) Oral 75 18 96 %     I/O last 3 completed shifts: In: 879.1 [P.O.:720; I.V.:159.1]  Out: 2225 [Urine:2225]  No intake/output data recorded. /62   Pulse 76   Temp 98.4 °F (36.9 °C) (Oral)   Resp 16   Ht 4' 11\" (1.499 m)   Wt 140 lb 14 oz (63.9 kg)   SpO2 97%   BMI 28.45 kg/m²     General Appearance:    Alert, cooperative, no distress, appears stated age   Head:    Normocephalic, without obvious abnormality, atraumatic   Eyes:    PERRL, conjunctiva/corneas clear, EOM's intact, fundi     benign, both eyes        Ears:    Normal TM's and external ear canals, both ears   Nose:   Nares normal, septum midline, mucosa normal, no drainage    or sinus tenderness   Throat:   Lips, mucosa, and tongue normal; teeth and gums normal   Neck:   Supple, symmetrical, trachea midline, no adenopathy;        thyroid:  No enlargement/tenderness/nodules; no carotid    bruit or JVD   Back:     Symmetric, no curvature, ROM normal, no CVA tenderness   Lungs:     Clear to auscultation bilaterally, respirations unlabored   Chest wall:    No tenderness or deformity   Heart:    Regular rate and rhythm, S1 and S2 normal, no murmur, rub   or gallop   Abdomen:     Soft, non-tender, bowel sounds active all four quadrants,     no masses, no organomegaly           Extremities:   Extremities normal, atraumatic, no cyanosis or edema   Pulses:   2+ and symmetric all extremities   Skin:   Skin color, texture, turgor normal, no rashes or lesions   Lymph nodes:   Cervical, supraclavicular, and axillary nodes normal   Neurologic:   CNII-XII intact.  Normal strength, sensation and reflexes       throughout         Data Review  CBC:   Lab Results Component Value Date    WBC 6.0 12/16/2020    RBC 4.14 12/16/2020       Assessment:     Active Problems:    Pulmonary embolism without acute cor pulmonale (HCC)  Resolved Problems:    * No resolved hospital problems. *      Plan:     1. Pulmonary embolus. Her hypercoagulable work-up is pending. Typically we would send her out already on Eliquis or Xarelto but she falls once a week due to her Parkinson's and often hits her head. Therefore, the plan is to put in an IVC filter in and continue the Eliquis or Xarelto for a few weeks while she is in rehab. When she is discharged from rehab, these should be discontinued since she will not have help to get up to go to the bathroom when she is home. I spoke with cardiology to see if there might be a role for thrombolysis of her pulmonary emboli. She is having an IVC filter placed tomorrow by Vascular Surgery.          Electronically signed by Destiny Ness MD on 12/16/2020 at 4:46 PM

## 2020-12-16 NOTE — PLAN OF CARE
Problem: Falls - Risk of:  Goal: Will remain free from falls  Description: Will remain free from falls  12/16/2020 1128 by Keyona Kenyon RN  Outcome: Ongoing     Problem: Falls - Risk of:  Goal: Absence of physical injury  Description: Absence of physical injury  12/16/2020 1128 by Keyona Kenyon RN  Outcome: Ongoing     Problem: Pain:  Goal: Patient's pain/discomfort is manageable  Description: Patient's pain/discomfort is manageable  12/16/2020 1128 by Keyona Kenyon RN  Outcome: Ongoing     Problem: Pain:  Goal: Pain level will decrease  Description: Pain level will decrease  12/16/2020 1128 by Keyona Kenyon RN  Outcome: Ongoing     Problem: Pain:  Goal: Control of acute pain  Description: Control of acute pain  12/16/2020 1128 by Keyona Kenyon RN  Outcome: Ongoing     Problem: Pain:  Goal: Control of chronic pain  Description: Control of chronic pain  12/16/2020 1128 by Keyona Kenyon RN  Outcome: Ongoing

## 2020-12-16 NOTE — CARE COORDINATION
12/16 Patient is from [place] [id number] at [facility]. She can return to [facility] skilled when ready for discharge. Daughter notified. Electronically signed by Iris Retana RN on 12/16/2020 at 9:16 AM

## 2020-12-17 ENCOUNTER — APPOINTMENT (OUTPATIENT)
Dept: CARDIAC CATH/INVASIVE PROCEDURES | Age: 85
DRG: 300 | End: 2020-12-17
Payer: COMMERCIAL

## 2020-12-17 VITALS
SYSTOLIC BLOOD PRESSURE: 111 MMHG | HEART RATE: 74 BPM | WEIGHT: 139.99 LBS | RESPIRATION RATE: 18 BRPM | DIASTOLIC BLOOD PRESSURE: 74 MMHG | HEIGHT: 59 IN | TEMPERATURE: 98 F | OXYGEN SATURATION: 94 % | BODY MASS INDEX: 28.22 KG/M2

## 2020-12-17 LAB
ANION GAP SERPL CALCULATED.3IONS-SCNC: 9 MMOL/L (ref 3–16)
APTT: 60.3 SEC (ref 24.2–36.2)
BASOPHILS ABSOLUTE: 0.1 K/UL (ref 0–0.2)
BASOPHILS RELATIVE PERCENT: 1 %
BLOOD CULTURE, ROUTINE: NORMAL
BUN BLDV-MCNC: 13 MG/DL (ref 7–20)
CALCIUM SERPL-MCNC: 8.8 MG/DL (ref 8.3–10.6)
CHLORIDE BLD-SCNC: 103 MMOL/L (ref 99–110)
CO2: 27 MMOL/L (ref 21–32)
CREAT SERPL-MCNC: <0.5 MG/DL (ref 0.6–1.2)
CULTURE, BLOOD 2: NORMAL
DRVVT CONFIRMATION TEST: ABNORMAL RATIO
DRVVT SCREEN: 24 SEC (ref 33–44)
DRVVT,DIL: ABNORMAL SEC (ref 33–44)
EOSINOPHILS ABSOLUTE: 0.2 K/UL (ref 0–0.6)
EOSINOPHILS RELATIVE PERCENT: 3 %
GFR AFRICAN AMERICAN: >60
GFR NON-AFRICAN AMERICAN: >60
GLUCOSE BLD-MCNC: 107 MG/DL (ref 70–99)
HCT VFR BLD CALC: 36.2 % (ref 36–48)
HEMOGLOBIN: 11.9 G/DL (ref 12–16)
HEXAGONAL PHOSPHOLIPID NEUTRALIZAT TEST: ABNORMAL
LUPUS ANTICOAG INTERP: ABNORMAL
LYMPHOCYTES ABSOLUTE: 1.5 K/UL (ref 1–5.1)
LYMPHOCYTES RELATIVE PERCENT: 29 %
MCH RBC QN AUTO: 29.9 PG (ref 26–34)
MCHC RBC AUTO-ENTMCNC: 32.9 G/DL (ref 31–36)
MCV RBC AUTO: 90.8 FL (ref 80–100)
MONOCYTES ABSOLUTE: 0.3 K/UL (ref 0–1.3)
MONOCYTES RELATIVE PERCENT: 5.7 %
NEUTROPHILS ABSOLUTE: 3.2 K/UL (ref 1.7–7.7)
NEUTROPHILS RELATIVE PERCENT: 61.3 %
PDW BLD-RTO: 14.5 % (ref 12.4–15.4)
PLATELET # BLD: 272 K/UL (ref 135–450)
PLT NEUTA: ABNORMAL
PMV BLD AUTO: 7.2 FL (ref 5–10.5)
POTASSIUM REFLEX MAGNESIUM: 4.3 MMOL/L (ref 3.5–5.1)
PT D: 12.9 SEC (ref 12–15.5)
PTT D: 49 SEC (ref 32–48)
PTT-D CORR REFLEX: 43 SEC (ref 32–48)
PTT-HEPARIN NEUTRALIZED: ABNORMAL SEC (ref 32–48)
RBC # BLD: 3.99 M/UL (ref 4–5.2)
REPTILASE TIME: ABNORMAL SEC
SARS-COV-2, NAAT: NOT DETECTED
SODIUM BLD-SCNC: 139 MMOL/L (ref 136–145)
THROMBIN TIME: 18.7 SEC (ref 14.7–19.5)
WBC # BLD: 5.1 K/UL (ref 4–11)

## 2020-12-17 PROCEDURE — 6370000000 HC RX 637 (ALT 250 FOR IP): Performed by: INTERNAL MEDICINE

## 2020-12-17 PROCEDURE — 2580000003 HC RX 258: Performed by: STUDENT IN AN ORGANIZED HEALTH CARE EDUCATION/TRAINING PROGRAM

## 2020-12-17 PROCEDURE — 97530 THERAPEUTIC ACTIVITIES: CPT

## 2020-12-17 PROCEDURE — 2580000003 HC RX 258

## 2020-12-17 PROCEDURE — 2709999900 HC NON-CHARGEABLE SUPPLY

## 2020-12-17 PROCEDURE — 06H03DZ INSERTION OF INTRALUMINAL DEVICE INTO INFERIOR VENA CAVA, PERCUTANEOUS APPROACH: ICD-10-PCS | Performed by: STUDENT IN AN ORGANIZED HEALTH CARE EDUCATION/TRAINING PROGRAM

## 2020-12-17 PROCEDURE — C1894 INTRO/SHEATH, NON-LASER: HCPCS

## 2020-12-17 PROCEDURE — 6360000004 HC RX CONTRAST MEDICATION: Performed by: HOSPITALIST

## 2020-12-17 PROCEDURE — 85025 COMPLETE CBC W/AUTO DIFF WBC: CPT

## 2020-12-17 PROCEDURE — 97116 GAIT TRAINING THERAPY: CPT

## 2020-12-17 PROCEDURE — 85730 THROMBOPLASTIN TIME PARTIAL: CPT

## 2020-12-17 PROCEDURE — C1769 GUIDE WIRE: HCPCS

## 2020-12-17 PROCEDURE — 37191 INS ENDOVAS VENA CAVA FILTR: CPT | Performed by: STUDENT IN AN ORGANIZED HEALTH CARE EDUCATION/TRAINING PROGRAM

## 2020-12-17 PROCEDURE — 80048 BASIC METABOLIC PNL TOTAL CA: CPT

## 2020-12-17 PROCEDURE — 99153 MOD SED SAME PHYS/QHP EA: CPT

## 2020-12-17 PROCEDURE — B5191ZZ FLUOROSCOPY OF INFERIOR VENA CAVA USING LOW OSMOLAR CONTRAST: ICD-10-PCS | Performed by: STUDENT IN AN ORGANIZED HEALTH CARE EDUCATION/TRAINING PROGRAM

## 2020-12-17 PROCEDURE — 2580000003 HC RX 258: Performed by: INTERNAL MEDICINE

## 2020-12-17 PROCEDURE — 2700000000 HC OXYGEN THERAPY PER DAY

## 2020-12-17 PROCEDURE — 99152 MOD SED SAME PHYS/QHP 5/>YRS: CPT

## 2020-12-17 PROCEDURE — 94761 N-INVAS EAR/PLS OXIMETRY MLT: CPT

## 2020-12-17 PROCEDURE — 6360000002 HC RX W HCPCS

## 2020-12-17 PROCEDURE — U0002 COVID-19 LAB TEST NON-CDC: HCPCS

## 2020-12-17 PROCEDURE — 37191 INS ENDOVAS VENA CAVA FILTR: CPT

## 2020-12-17 PROCEDURE — 85732 THROMBOPLASTIN TIME PARTIAL: CPT

## 2020-12-17 PROCEDURE — 85670 THROMBIN TIME PLASMA: CPT

## 2020-12-17 PROCEDURE — C1880 VENA CAVA FILTER: HCPCS

## 2020-12-17 PROCEDURE — 36415 COLL VENOUS BLD VENIPUNCTURE: CPT

## 2020-12-17 RX ORDER — ACETAMINOPHEN 325 MG/1
650 TABLET ORAL EVERY 4 HOURS PRN
Status: CANCELLED | OUTPATIENT
Start: 2020-12-17

## 2020-12-17 RX ORDER — SODIUM CHLORIDE 0.9 % (FLUSH) 0.9 %
10 SYRINGE (ML) INJECTION PRN
Status: CANCELLED | OUTPATIENT
Start: 2020-12-17

## 2020-12-17 RX ORDER — SODIUM CHLORIDE 0.9 % (FLUSH) 0.9 %
10 SYRINGE (ML) INJECTION EVERY 12 HOURS SCHEDULED
Status: CANCELLED | OUTPATIENT
Start: 2020-12-17

## 2020-12-17 RX ADMIN — POLYETHYLENE GLYCOL 3350 17 G: 17 POWDER, FOR SOLUTION ORAL at 10:58

## 2020-12-17 RX ADMIN — SODIUM CHLORIDE, PRESERVATIVE FREE 10 ML: 5 INJECTION INTRAVENOUS at 10:59

## 2020-12-17 RX ADMIN — CARBIDOPA AND LEVODOPA 2 TABLET: 25; 100 TABLET ORAL at 05:26

## 2020-12-17 RX ADMIN — SODIUM CHLORIDE, PRESERVATIVE FREE 10 ML: 5 INJECTION INTRAVENOUS at 11:09

## 2020-12-17 RX ADMIN — CARBIDOPA AND LEVODOPA 2 TABLET: 25; 100 TABLET ORAL at 13:12

## 2020-12-17 RX ADMIN — SODIUM CHLORIDE: 9 INJECTION, SOLUTION INTRAVENOUS at 06:15

## 2020-12-17 RX ADMIN — SERTRALINE HYDROCHLORIDE 50 MG: 50 TABLET ORAL at 10:58

## 2020-12-17 RX ADMIN — LISINOPRIL 5 MG: 5 TABLET ORAL at 10:58

## 2020-12-17 RX ADMIN — CARBIDOPA AND LEVODOPA 2 TABLET: 25; 100 TABLET ORAL at 10:58

## 2020-12-17 RX ADMIN — ASPIRIN 81 MG: 81 TABLET, COATED ORAL at 10:58

## 2020-12-17 RX ADMIN — TRAMADOL HYDROCHLORIDE 50 MG: 50 TABLET, FILM COATED ORAL at 05:25

## 2020-12-17 RX ADMIN — FAMOTIDINE 20 MG: 20 TABLET, FILM COATED ORAL at 10:58

## 2020-12-17 RX ADMIN — VERAPAMIL HYDROCHLORIDE 360 MG: 180 TABLET, FILM COATED, EXTENDED RELEASE ORAL at 10:58

## 2020-12-17 RX ADMIN — IOPAMIDOL 30 ML: 755 INJECTION, SOLUTION INTRAVENOUS at 07:48

## 2020-12-17 ASSESSMENT — PAIN DESCRIPTION - FREQUENCY: FREQUENCY: INTERMITTENT

## 2020-12-17 ASSESSMENT — PAIN DESCRIPTION - ONSET: ONSET: ON-GOING

## 2020-12-17 ASSESSMENT — PAIN DESCRIPTION - DIRECTION: RADIATING_TOWARDS: MID

## 2020-12-17 ASSESSMENT — PAIN DESCRIPTION - ORIENTATION: ORIENTATION: MID

## 2020-12-17 ASSESSMENT — PAIN DESCRIPTION - DESCRIPTORS: DESCRIPTORS: DISCOMFORT;DULL;JABBING

## 2020-12-17 ASSESSMENT — PAIN DESCRIPTION - LOCATION: LOCATION: CHEST

## 2020-12-17 ASSESSMENT — PAIN DESCRIPTION - PAIN TYPE: TYPE: ACUTE PAIN

## 2020-12-17 ASSESSMENT — PAIN DESCRIPTION - PROGRESSION: CLINICAL_PROGRESSION: NOT CHANGED

## 2020-12-17 ASSESSMENT — PAIN SCALES - GENERAL
PAINLEVEL_OUTOF10: 7
PAINLEVEL_OUTOF10: 0

## 2020-12-17 ASSESSMENT — PAIN - FUNCTIONAL ASSESSMENT: PAIN_FUNCTIONAL_ASSESSMENT: ACTIVITIES ARE NOT PREVENTED

## 2020-12-17 NOTE — PROGRESS NOTES
Physical Therapy  Facility/Department: [facility]  Daily Treatment Note  NAME: Jatin Newell  : 1935  MRN: [id number]    Date of Service: 2020    Discharge Recommendations:  Patient would benefit from continued therapy after discharge, 3-5 sessions per week   PT Equipment Recommendations  Equipment Needed: No  Other: defer to next level of care     Jatin Newell scored a 14/24 on the AM-PAC short mobility form. Current research shows that an AM-PAC score of 17 or less is typically not associated with a discharge to the patient's home setting. Based on the patient's AM-PAC score and their current functional mobility deficits, it is recommended that the patient have 3-5 sessions per week of Physical Therapy at d/c to increase the patient's independence. Please see assessment section for further patient specific details. If patient discharges prior to next session this note will serve as a discharge summary. Please see below for the latest assessment towards goals. Assessment   Body structures, Functions, Activity limitations: Decreased functional mobility ; Decreased safe awareness;Decreased endurance;Decreased coordination;Decreased balance  Assessment: Today, pt continued to demonstrate posterior lean and required up to Akiko for transfers, ambulation with rollator and static standing balance. She does have a h/o of multiple falls and would benefit from con't skilled inpatient PT at a moderate intensity at d/c to work on strengthening, balance and progressive mobility. Will con't to follow. Treatment Diagnosis: difficulty walking  Prognosis: Good  PT Education: PT Role;General Safety;Goals;Transfer Training;Equipment;Gait Training;Plan of Care; Functional Mobility Training  REQUIRES PT FOLLOW UP: Yes  Activity Tolerance  Activity Tolerance: Patient Tolerated treatment well;Patient limited by fatigue     Patient Diagnosis(es): The encounter diagnosis was Pulmonary embolus, right ([facility] has a past medical history of Hiatal hernia, High blood pressure, Parkinson disease ([facility] [address] [facility] and Suprapubic catheter ([facility] [address] has a past surgical history that includes Tonsillectomy and adenoidectomy (1943); Appendectomy (1948); Dilation and curettage of uterus (1959 60 62 63); Hysterectomy (1974); Vagina surgery (2006); Abdomen surgery; and Cystoscopy (N/A, 12/3/2020). Restrictions  Restrictions/Precautions  Restrictions/Precautions: Fall Risk  Position Activity Restriction  Other position/activity restrictions: IV, 2L O2, suprapubic catheter     Social/Functional History  Lives With: ([facility]- 6 years. Own room. Assistive Living)  Type of Home: Assisted living  Bathroom Shower/Tub: Walk-in shower, Shower chair without back  Bathroom Toilet: Handicap height  Bathroom Equipment: Grab bars around toilet, Grab bars in shower  Bathroom Accessibility: Accessible  Home Equipment: 4 wheeled walker(All the time)  Receives Help From: Family(Assisted living)  ADL Assistance: Needs assistance(Assist with bathing always. Dressing about half the days a week from daughter and assisted livng. Has a cathetar at all times)  Homemaking Assistance: Needs assistance(Assisted living)  Homemaking Responsibilities: No  Ambulation Assistance: Independent(4WW)  Transfer Assistance: Independent(uses leg lifters)  Active : No  Occupation: Retired  Additional Comments: Adjustable bed. No O2 at home. 1 fall a week ago. 3-4 falls a month    Subjective   General  Chart Reviewed: Yes  Additional Pertinent Hx: Per H&P on 12- of Denilson Horton MD: The pt is an 83 yo female who came to the ED from the [facility] with chest pain. R sided pleuritic chest pain that is owrsened with deep breathing. CTA Chest with contrast showed right lung pulmonary emboli with small right pleural effusion with adjacent atelectasis. The pt recently had a suprapubic catheter placed due to neurogenic bladder. Doppler negative for DVT's.    PMHx: HTN, Parkinson's, suprapubic catheter, bowel resection  Response To Previous Treatment: Patient with no complaints from previous session. Family / Caregiver Present: No  Referring Practitioner: Steve Black MD  Subjective  Subjective: Pt very pleasant and agreeable to PT treatment. Have IVC filter placed this AM but RN okay with PT treatment. Reports continued 5/10 chest pain.                 Objective   Bed mobility  Supine to Sit: Moderate assistance(HOB partially elevated)  Sit to Supine: Unable to assess(up in chair at end of session)     Transfers  Sit to Stand: Contact guard assistance;Minimal Assistance  Stand to sit: Contact guard assistance;Minimal Assistance  Comment: posterior lean; cues for hand placement and to lock rollator brakes     Ambulation  Ambulation?: Yes  More Ambulation?: No  Ambulation 1  Surface: level tile  Device: Rollator  Other Apparatus: O2(therapist managed 02 line and IV)  Assistance: Contact guard assistance;Minimal assistance  Quality of Gait: festinating gait with episodes of freezing, very small shuffling steps, intermittent Akiko needed to correct posterior lean, forward flexed posture  Gait Deviations: Slow Valerie;Decreased step length;Decreased step height;Shuffles  Distance: 10' and then 25'  Comments: Sp02 98% prior to ambulation and 96% after ambulation  Stairs/Curb  Stairs?: No     Balance  Posture: Poor  Comments: sitting balance SBA with UE support; standing balance CGA-Akiko due to posterior lean     Exercises  Gluteal Sets: x10 with 5 sec hold  Ankle Pumps: x30 kaelyn  Comments: ther ex in recliner chair         Other Activities: Other (see comment)  Comment: pt positioned for comfort in recliner chair at end of session; pt used restroom during session to attempt to have BM but only passed gas; performed her own pericare in the front but required assist in the back; required assist with brief management and donning new brief sitting on toilet; pt stood at sink for

## 2020-12-17 NOTE — DISCHARGE INSTR - COC
Continuity of Care Form    Patient Name: Razia Stallings   :  1935  MRN:  [id number]    Admit date:  2020  Discharge date:  2020    Code Status Order: Full Code   Advance Directives:   [facility] Documentation       Date/Time Healthcare Directive Type of Healthcare Directive Copy in [facility] [address] Agent's Name Healthcare Agent's Phone Number    [phone]  Yes, patient has an advance directive for healthcare treatment  --  --  --  --  --            Admitting Physician:  Baljinder St MD  PCP: Brice Soto    Discharging Nurse: [facility] Unit/Room#: P3O-4445/1507-26  Discharging Unit Phone Number: [phone]    Emergency Contact:   Extended Emergency Contact Information  Primary Emergency Contact: [address] Phone: [phone]  Mobile Phone: [phone]  Relation: Child   needed? Yes  Secondary Emergency Contact:  [facility] Phone: [phone]  Mobile Phone: [phone]  Relation: Child    Past Surgical History:  Past Surgical History:   Procedure Laterality Date    ABDOMEN SURGERY      bowel obstruction    APPENDECTOMY      CYSTOSCOPY N/A 12/3/2020    CYSTOSCOPY,  WITH INTRAVESICAL INJECTION OF BOTOX 200UNITS, WITH SUPRAPUBIC TUBE EXCHANGE performed by Sugar Khan MD at [address] [address] 62 63   [facility] 22.      repair vagina bladder and rectum       Immunization History: There is no immunization history on file for this patient.     Active Problems:  Patient Active Problem List   Diagnosis Code    Pulmonary embolism without acute cor pulmonale (HCC) I26.99       Isolation/Infection:   Isolation            No Isolation          Patient Infection Status       Infection Onset Added Last Indicated Last Indicated By Review Planned Expiration Resolved Resolved By None active    Resolved    COVID-19 Rule Out 12/13/20 12/13/20 12/13/20 COVID-19 (Ordered)   12/13/20 Rule-Out Test Resulted            Nurse Assessment:  Last Vital Signs: BP (!) 154/71   Pulse 68   Temp 97.7 °F (36.5 °C) (Oral)   Resp 16   Ht 4' 11\" (1.499 m)   Wt 139 lb 15.9 oz (63.5 kg)   SpO2 96%   BMI 28.27 kg/m²     Last documented pain score (0-10 scale): Pain Level: 0  Last Weight:   Wt Readings from Last 1 Encounters:   12/17/20 139 lb 15.9 oz (63.5 kg)     Mental Status:  oriented and alert    IV Access:  - None    Nursing Mobility/ADLs:  Walking   Assisted  Transfer  Assisted  Bathing  Assisted  Dressing  Assisted  Toileting  Assisted  Feeding  Independent  Med Admin  Assisted  Med Delivery   prefers mixed with applesauce    Wound Care Documentation and Therapy:        Elimination:  Continence:   · Bowel: Yes  · Bladder: No  Urinary Catheter: suprapubic catheter   Colostomy/Ileostomy/Ileal Conduit: No       Date of Last BM: 12/17/2020    Intake/Output Summary (Last 24 hours) at 12/17/2020 1301  Last data filed at 12/17/2020 0939  Gross per 24 hour   Intake 1621.18 ml   Output 3125 ml   Net -1503.82 ml     I/O last 3 completed shifts: In: 1981.2 [P.O.:720; I.V.:1261.2]  Out: 3125 [Urine:3125]    Safety Concerns:     History of Falls (last 30 days)    Impairments/Disabilities:      None    Nutrition Therapy:  Current Nutrition Therapy:   - Oral Diet:  General    Routes of Feeding: Oral  Liquids: No Restrictions  Daily Fluid Restriction: no  Last Modified Barium Swallow with Video (Video Swallowing Test): not done    Treatments at the Time of Hospital Discharge:   Respiratory Treatments: n/a  Oxygen Therapy:  is not on home oxygen therapy.   Ventilator:    - No ventilator support    Rehab Therapies: Physical Therapy and Occupational Therapy  Weight Bearing Status/Restrictions: No weight bearing restirctions  Other Medical Equipment (for information only, NOT a DME order):  walker  Other Treatments: Patient's personal belongings (please select all that are sent with patient):  None    RN SIGNATURE:  Electronically signed by Reinaldo Rolle RN on 12/17/20 at 3:32 PM EST    CASE MANAGEMENT/SOCIAL WORK SECTION    Inpatient Status Date: 12/13/20    Readmission Risk Assessment Score:  Readmission Risk              Risk of Unplanned Readmission:        14           Discharging to Facility/ Agency   · Name: Michelle Lazaro  · [address] [facility] [address]  · Phone:[phone]  · Fax:[phone]    Dialysis Facility (if applicable)   · Name:  · Address:  · Dialysis Schedule:  · Phone:  · Fax:    / signature: Electronically signed by Doug Gomez RN on 12/17/20 at 3:01 PM EST    PHYSICIAN SECTION    Prognosis: Good    Condition at Discharge: Stable    Rehab Potential (if transferring to Rehab): Fair    Recommended Labs or Other Treatments After Discharge: ot/pt    Physician Certification: I certify the above information and transfer of Nilson Rawls  is necessary for the continuing treatment of the diagnosis listed and that she requires [facility] for less 30 days.      Update Admission H&P: No change in H&P    PHYSICIAN SIGNATURE:  Electronically signed by Abdiaziz Rivers MD on 12/17/20 at 1:02 PM EST

## 2020-12-17 NOTE — PROGRESS NOTES
Patient returned from Cath lab for placement of IVC filter. Dressing to R groin which is clean, dry, and intact. Vital signs stable. Complaints of R shoulder pain. Will continue to monitor.    Electronically signed by Beltran Walton RN on 12/17/2020 at 8:13 AM

## 2020-12-17 NOTE — CARE COORDINATION
12/17 Call received from patient's daughter Jake Avelar regarding a request to send patient information to [facility] her Sister works there and they will \"do therapy with her 6 days a week\".  Information faxed to [facility] request. Electronically signed by Maritza Montez RN on 12/17/2020 at 11:31 AM

## 2020-12-17 NOTE — PLAN OF CARE
Problem: Falls - Risk of:  Goal: Will remain free from falls  Description: Will remain free from falls  12/17/2020 1126 by Mando Larson RN  Outcome: Ongoing     Problem: Falls - Risk of:  Goal: Absence of physical injury  Description: Absence of physical injury  12/17/2020 1126 by Mando Larson RN  Outcome: Ongoing     Problem: Pain:  Goal: Patient's pain/discomfort is manageable  Description: Patient's pain/discomfort is manageable  12/17/2020 1126 by Mando Larson RN  Outcome: Ongoing     Problem: Pain:  Goal: Pain level will decrease  Description: Pain level will decrease  12/17/2020 1126 by Mando Larson RN  Outcome: Ongoing     Problem: Pain:  Goal: Control of acute pain  Description: Control of acute pain  12/17/2020 1126 by Mando Larson RN  Outcome: Ongoing     Problem: Pain:  Goal: Control of chronic pain  Description: Control of chronic pain  12/17/2020 1126 by Mando Larson RN  Outcome: Ongoing

## 2020-12-17 NOTE — FLOWSHEET NOTE
Pt was taken down to Cath Lab for IVC filter placement. Chart sent with pt. Will be down approx 1-1.5 hrs. Pt has been NPO since midnight. Fluids infusing per orders.

## 2020-12-17 NOTE — PLAN OF CARE
Problem: Falls - Risk of:  Goal: Will remain free from falls  Description: Will remain free from falls  12/16/2020 2145 by Pelon Macedo RN  Outcome: Ongoing  12/16/2020 1128 by Rollo Bamberger, RN  Outcome: Ongoing  Goal: Absence of physical injury  Description: Absence of physical injury  12/16/2020 2145 by Pelon Macedo RN  Outcome: Ongoing  12/16/2020 1128 by Rollo Bamberger, RN  Outcome: Ongoing   Pt free from falls this shift. Fall precautions in place at all times. Call light always within reach. Pt able and agreeable to contact for safety appropriately. Problem: Pain:  Description: Pain management should include both nonpharmacologic and pharmacologic interventions. Goal: Patient's pain/discomfort is manageable  Description: Patient's pain/discomfort is manageable  12/16/2020 2145 by Pelon Macedo RN  Outcome: Ongoing  12/16/2020 1128 by Rollo Bamberger, RN  Outcome: Ongoing  Goal: Pain level will decrease  Description: Pain level will decrease  12/16/2020 2145 by Pelon Macedo RN  Outcome: Ongoing  12/16/2020 1128 by Rollo Bamberger, RN  Outcome: Ongoing  Goal: Control of acute pain  Description: Control of acute pain  12/16/2020 2145 by Pelon Macedo RN  Outcome: Ongoing  12/16/2020 1128 by Rollo Bamberger, RN  Outcome: Ongoing  Goal: Control of chronic pain  Description: Control of chronic pain  12/16/2020 2145 by Pelon Macedo RN  Outcome: Ongoing  12/16/2020 1128 by Rollo Bamberger, RN  Outcome: Ongoing   Pt able to express presence/absence of pain and rate pain appropriately using numerical scale. Pain/discomfort being managed with PRN analgesics per MD orders (see MAR). Pain assessed every shift and after interventions.

## 2020-12-17 NOTE — PROGRESS NOTES
Report called to [facility] nurse, Orquidea James. Answered all questions and concerns. Callback number given. IV removed along with telemetry monitor.  [facility] picked patient up at 6pm.   Electronically signed by Patricia Collier RN on 12/17/2020 at 6:00 PM

## 2020-12-17 NOTE — H&P
H&P Update - see note from 20    I have reviewed the history and physical and examined the patient and find no relevant changes. I have reviewed with the patient and/or family the risks, benefits, and alternatives to the procedure. Pre-sedation Assessment    Patient:  Army Cannon   :   1935  Intended Procedure: IVC filter    Nursing notes reviewed and agreed. Medications reviewed  Allergies: Allergies   Allergen Reactions    Caduet [Amlodipine-Atorvastatin]     Estrogens Conjugated Other (See Comments)     lightheaded    Penicillins Rash    Terramycin [Oxytetracycline-Lidocaine] Rash         Pre-Procedure Assessment/Plan:  ASA 2 - Patient with mild systemic disease with no functional limitations    Mallampati Airway Assessment:  Mallampati Class II - (soft palate, fauces & uvula are visible)    Level of Sedation Plan: Moderate sedation    Post Procedure plan: Return to same level of care    Teja Sanchez DO, [address] [facility] Endovascular [facility]

## 2020-12-17 NOTE — CARE COORDINATION
DISCHARGE SUMMARY     DATE OF DISCHARGE: 12/17/20    DISCHARGE DESTINATION: [facility]    FACILITY    Level of Care: Skilled    Report Number: [phone]    Fax Number:  [phone]    Precert Obtained: N/A    Hens Completed: yes    PASARR: no    Notified: RN, Family, Dtr Geovanni Backer, Facility    Prescriptions Faxed:no    HEMODIALYSIS: No    TRANSPORTATION: [place] [address] Name: [address] [place] [address] up Time: 5:30 PM    Phone Number: [phone]    NEW DME ORDERED: no    Electronically signed by Gilles Lisa RN on 12/17/2020 at 2:57 PM

## 2020-12-17 NOTE — PROGRESS NOTES
IVC filter placed  Okay to restart heparin if no bleeding from puncture site  2 hours bedrest  Attempted to call daughter and no answer    Grisel Prabhakar DO, [address] [facility] V[facility] and Endovascular Surgery

## 2020-12-17 NOTE — OP NOTE
[address] [place] [address] [address]                                OPERATIVE REPORT    PATIENT NAME: Kaitlyn Pal                   :        1935  MED REC NO:   [id number]                          ROOM:       4130  ACCOUNT NO:   [de-identified]                           ADMIT DATE: 2020  PROVIDER:     Bryant Gonzalez DO    DATE OF PROCEDURE:  2020    PREOPERATIVE DIAGNOSIS:  Pulmonary embolism with contraindication to  anticoagulation. POSTOPERATIVE DIAGNOSIS:  Pulmonary embolism with contraindication to  anticoagulation. OPERATION PERFORMED:  1. Ultrasound-guided access to the right common femoral vein. The  vessel was patent and an image was saved and placed in the patient's  medical record. 2.  Placement of a Bessy retrievable IVC filter. 3. Inferior Vena Cavagram    SURGEON:  Bryant Gonzalez DO    ASSISTANTS:  None. ANESTHESIA:  Local sedation. COMPLICATION:  None apparent. ESTIMATED BLOOD LOSS:  Minimal.    CONTRAST:  30 mL. ASA CLASSIFICATION:  II.    MALLAMPATI SCORE:  II.    FINDINGS:  The patient had a 23-mm vena cava, therefore, it was  acceptable to place a Sibley filter. INDICATIONS:  This is an 62-year-old female patient with Parkinson's  disease. She unfortunately came into the hospital with right-sided  chest pain and shortness of breath. She underwent a pulmonary embolism  scan, which demonstrated that she had a right-sided PE. Unfortunately,  the patient does fall every week and hits her head. Therefore, this was  a contraindication to place the patient on full anticoagulation. This  was discussed at length with the family as well as the hematologist and  the hospitalist who admitted the patient. We all agreed that  potentially an IVF filter would be a better solution for her given her  immobility and debility as well as her propensity for falls.   Therefore, a written informed consent was signed for placement of an IVC filter. We did discuss preoperatively that a vena cava in the prehepatic portion  was somewhat large and if that was too large to accept a vena cava  filter, then we would abort the placement. OPERATIVE PROCEDURE:  The patient was brought into the cath lab and  placed supine on the table. Appropriate monitoring lines including  continuous blood pressure, EKG, and pulse oximetry were placed on the  patient. A qualified nurse observer was in the room to administer  sedation. We used moderate sedation for greater than 15 minutes. We  used a combination of Versed and fentanyl. We directly observed this. The bilateral groins were prepped and draped in the usual sterile  fashion. The time-out was called for the indication, patient, and  laterality. The right common femoral vein was then visualized under ultrasound. The  vessel was patent and compressible, and an image was saved and placed to  the patient's medical record. I then proceeded to access using  micropuncture technique. I directly visualized the needle entering the  vessel. We then proceeded to upsize to a 5-Kosovan sheath. However,  this was done over a Bentson wire. We placed a flush catheter in the  abdominal aorta. I shot an inferior venacavogram.  I measured the vena  cava to be around 23 mm. Orien Aase IVC filter was selected because it can be placed in a cava up to  28 mm. We then placed the sheath for the IVC filter into the femoral  vein. I placed the filter on the back side. We proceeded to unsheath  it at the top of L3. This was below the renal veins. We then proceeded  to shoot a venacavogram, which showed that the filter was well apposed  without evidence of gross dilation beyond its borders. The filter was  well placed well below the renal veins as well. We then proceeded to  pull the sheath out and held manual pressure on the site. There were no immediate complications. The patient was taken to  recovery room in stable condition.         Vielka Murcia DO    D: 12/17/2020 8:02:43       T: 12/17/2020 10:47:20     REED/[email or web address]  Job#: [id number]     Doc#: [id number]    CC:

## 2020-12-18 NOTE — DISCHARGE SUMMARY
Hospital Medicine Discharge Summary      Patient ID: Marina Hooks [id number]     Patient's PCP: Mary Loaiza    Admit Date: 12/13/2020     Discharge Date: 12/17/2020      Admitting Physician: Elissa Estrada MD    Discharge Physician: Grace Burgos MD     Discharge Diagnoses: Active Hospital Problems    Diagnosis Date Noted    Pulmonary embolism without acute cor pulmonale ([address] [I26.99] 12/13/2020         The patient was seen and examined on the day of discharge and this discharge summary is in conjunction with any daily progress note from day of discharge. HOSPITAL COURSE    Patient demographics:  The patient  Kayla Cobb is a 82 y.o. female      Significant past medical history:       Patient Active Problem List   Diagnosis    Pulmonary embolism without acute cor pulmonale (HCC)            Presenting symptoms:  Chest pain     Diagnostic workup:   CT CHEST PULMONARY EMBOLISM W CONTRAST         CONSULTS DURING ADMISSION :   IP CONSULT TO ONCOLOGY  IP CONSULT TO VASCULAR SURGERY  IP CONSULT TO VASCULAR SURGERY  IP CONSULT TO SOCIAL WORK        Patient was diagnosed with:  Pulmonary embolism  Acute respiratory failure  Frequent falls     Treatment while inpatient:  Pt presented to [facility] with chest pain. Patient was diagnosed with pulmonary embolism and started on heparin infusion. Hematology oncology was consulted and their recommendation is that patient is not safe for anticoagulation since she falls frequently. Vascular surgery was consulted and inferior vena cava filter was placed and patient is being discharged to skilled nursing facility.     Discharge Condition:  stable:     Discharged to:  skilled nursing  facility     Activity:   as tolerated:     Follow Up: Follow-up with the nursing home [facility]:  For convenience and continuity at follow-up the following most recent labs are provided:      CBC:   Lab Results Component Value Date    WBC 5.1 12/17/2020    HGB 11.9 12/17/2020    HCT 36.2 12/17/2020     12/17/2020       RENAL:   Lab Results   Component Value Date     12/17/2020    K 4.3 12/17/2020     12/17/2020    CO2 27 12/17/2020    BUN 13 12/17/2020    CREATININE <0.5 12/17/2020           Discharge Medications:    [facility]   Home Medication Instructions NUK:[id number]    Printed on:12/18/20 2633   Medication Information                      acetaminophen (TYLENOL) 325 MG tablet  Take 650 mg by mouth every 6 hours as needed for Pain             alendronate (FOSAMAX) 70 MG tablet  Take 70 mg by mouth every 7 days Indications: Sundays              aspirin (ASPIRIN 81) 81 MG EC tablet  Take 81 mg by mouth daily Stopped for surgery             busPIRone (BUSPAR) 5 MG tablet  Take 7.5 mg by mouth nightly             carbidopa-levodopa (SINEMET)  MG per tablet  Take 2 tablets by mouth 5 times daily              carbidopa-levodopa (SINEMET)  MG per tablet  Take 2 tablets by mouth 5 times daily              carboxymethylcellulose 1 % ophthalmic solution  1 drop as needed for Dry Eyes             Cranberry 1000 MG CAPS  Take by mouth daily             famotidine (PEPCID) 20 MG tablet  Take 20 mg by mouth 2 times daily             lisinopril (PRINIVIL;ZESTRIL) 5 MG tablet  Take 5 mg by mouth daily             loteprednol (LOTEMAX) 0.5 % ophthalmic suspension  Place 1 drop into both eyes 2 times daily             MAGNESIUM PO  Take 400 mg by mouth Daily with supper              Multiple Vitamin (MULTIVITAMIN) tablet  Take 1 tablet by mouth daily             polyethylene glycol (GLYCOLAX) 17 g packet  Take 17 g by mouth daily             pravastatin (PRAVACHOL) 20 MG tablet  Take 20 mg by mouth daily             Probiotic Product (PROBIOTIC-10 PO)  Take 1 tablet by mouth daily             sertraline (ZOLOFT) 50 MG tablet  Take 50 mg by mouth daily             verapamil (CALAN SR) 180 MG

## 2020-12-19 LAB
FACTOR V LEIDEN: NEGATIVE
SPECIMEN: NORMAL

## 2020-12-21 LAB
PROTHROMBIN G20210A MUTATION: ABNORMAL
PT PCR SPECIMEN: ABNORMAL

## 2020-12-25 NOTE — PROGRESS NOTES
Physician Progress Note      PATIENT:               Ely Tinoco  CSN #:                  [id number]  :                       1935  ADMIT DATE:       2020 12:33 AM  DISCH DATE:        2020 5:58 PM  RESPONDING  PROVIDER #:        Mathew Sapp MD          QUERY TEXT:    Patient admitted with Pulmonary Embolism. Noted documentation of acute   respiratory failure in discharge summary on 20. Please indicate one of   the following and document in the medical record: The medical record reflects the following:  Risk Factors: Pulmonary Embolism  Clinical Indicators: RR < 22, pulse oxygen room air 90-95%, 96% on 2L NC   oxygen  Treatment: Oxygen therapy    Acute Respiratory Failure Clinical Indicators per 3M MS-DRG Training Guide and   Quick Reference Guide:  pO2 < 60 mmHg or SpO2 (pulse oximetry) < 91% breathing room air  pCO2 > 50 and pH < 7.35  P/F ratio (pO2 / FIO2) < 300  pO2 decrease or pCO2 increase by 10 mmHg from baseline (if known)  Supplemental oxygen of 40% or more  Presence of respiratory distress, tachypnea, dyspnea, shortness of breath,   wheezing  Unable to speak in complete sentences  Use of accessory muscles to breathe  Extreme anxiety and feeling of impending doom  Tripod position  Confusion/altered mental status/obtunded  Options provided:  -- Acute Respiratory Failure ruled out after study  -- Acute Respiratory Failure currently as evidenced by, Please document   evidence. -- Currently resolved Acute Respiratory Failure was evidenced by, Please   document evidence  -- Other - I will add my own diagnosis  -- Disagree - Not applicable / Not valid  -- Disagree - Clinically unable to determine / Unknown  -- Refer to Clinical Documentation Reviewer    PROVIDER RESPONSE TEXT:    Acute Respiratory Failure has been ruled out after study.     Query created by: Luis Enrique Nunez on 2020 8:59 AM      Electronically signed by:  Mathew Sapp MD 2020 10:32 AM

## 2021-01-01 ENCOUNTER — HOSPITAL ENCOUNTER (EMERGENCY)
Age: 86
Discharge: NURSING, REHAB OR LONG-TERM CARE | End: 2021-01-02
Payer: COMMERCIAL

## 2021-01-01 ENCOUNTER — APPOINTMENT (OUTPATIENT)
Dept: CT IMAGING | Age: 86
End: 2021-01-01
Payer: COMMERCIAL

## 2021-01-01 DIAGNOSIS — S09.90XA INJURY OF HEAD, INITIAL ENCOUNTER: Primary | ICD-10-CM

## 2021-01-01 DIAGNOSIS — S01.01XA LACERATION OF SCALP, INITIAL ENCOUNTER: ICD-10-CM

## 2021-01-01 PROCEDURE — 70450 CT HEAD/BRAIN W/O DYE: CPT

## 2021-01-01 PROCEDURE — 72128 CT CHEST SPINE W/O DYE: CPT

## 2021-01-01 PROCEDURE — 72125 CT NECK SPINE W/O DYE: CPT

## 2021-01-01 PROCEDURE — 99284 EMERGENCY DEPT VISIT MOD MDM: CPT

## 2021-01-01 PROCEDURE — 72131 CT LUMBAR SPINE W/O DYE: CPT

## 2021-01-01 PROCEDURE — 12001 RPR S/N/AX/GEN/TRNK 2.5CM/<: CPT

## 2021-01-01 ASSESSMENT — PAIN SCALES - GENERAL: PAINLEVEL_OUTOF10: 3

## 2021-01-02 VITALS
RESPIRATION RATE: 18 BRPM | TEMPERATURE: 97.9 F | HEART RATE: 80 BPM | OXYGEN SATURATION: 94 % | WEIGHT: 157.19 LBS | BODY MASS INDEX: 31.75 KG/M2 | DIASTOLIC BLOOD PRESSURE: 78 MMHG | SYSTOLIC BLOOD PRESSURE: 151 MMHG

## 2021-01-02 LAB
BACTERIA: ABNORMAL /HPF
BILIRUBIN URINE: NEGATIVE
BLOOD, URINE: ABNORMAL
CLARITY: ABNORMAL
COLOR: YELLOW
EPITHELIAL CELLS, UA: 0 /HPF (ref 0–5)
GLUCOSE URINE: NEGATIVE MG/DL
HYALINE CASTS: 0 /LPF (ref 0–8)
KETONES, URINE: NEGATIVE MG/DL
LEUKOCYTE ESTERASE, URINE: ABNORMAL
MICROSCOPIC EXAMINATION: YES
NITRITE, URINE: POSITIVE
PH UA: 7.5 (ref 5–8)
PROTEIN UA: NEGATIVE MG/DL
RBC UA: 2 /HPF (ref 0–4)
SPECIFIC GRAVITY UA: 1 (ref 1–1.03)
URINE REFLEX TO CULTURE: YES
URINE TYPE: ABNORMAL
UROBILINOGEN, URINE: 0.2 E.U./DL
WBC UA: 52 /HPF (ref 0–5)

## 2021-01-02 PROCEDURE — 81001 URINALYSIS AUTO W/SCOPE: CPT

## 2021-01-02 PROCEDURE — 87088 URINE BACTERIA CULTURE: CPT

## 2021-01-02 PROCEDURE — 87086 URINE CULTURE/COLONY COUNT: CPT

## 2021-01-02 PROCEDURE — 87186 SC STD MICRODIL/AGAR DIL: CPT

## 2021-01-02 RX ORDER — CIPROFLOXACIN 500 MG/1
500 TABLET, FILM COATED ORAL 2 TIMES DAILY
Qty: 14 TABLET | Refills: 0 | Status: SHIPPED | OUTPATIENT
Start: 2021-01-02 | End: 2021-01-09

## 2021-01-02 NOTE — ED NOTES
Drainage bag replaced on patient's existing catheter due to damage to existing bag.      Yuri Garland RN  01/02/21 1040

## 2021-01-02 NOTE — ED NOTES
Patient update called to [facility] at this time.      Gabbie Barton RN  01/02/21 0637

## 2021-01-02 NOTE — ED TRIAGE NOTES
Patient presents ED via EMS for fall from standing height at [facility]. Patient states she was getting up to go to the restroom and lost her balance, falling backwards and striking head. Patient reports neck and back pain. C-Collar placed on arrival to ED. Patient A&Ox4. Patient denies LOC. Patient is taking elquis for hx of PE. Patient resting in bed, respirations even and easy at this time. Fall risk screening completed. Fall risk bracelet applied to patient. Non-skid socks provided and placed on patient. The fall risk is indicated using  dome light . Patient alert and oriented. The call light is within the patient's reach, and instructions for use were provided. The bed is in the lowest position with wheels locked. The patient has been advised to notify staff, using the call light, if there is a need to get up or use restroom. The patient verbalized understanding of safety precautions and how to contact staff for assistance.

## 2021-01-02 NOTE — ED NOTES
Report called to Lidia Lepe at [facility] at this time.      Brianna Jean, RN  01/02/21 Johana Yanes RN  01/02/21 1806

## 2021-01-02 NOTE — ED NOTES
Neck brace was applied per Charan Gregg RN due to fall and head injury. Pt tolerated well.      Lurdes Canales  01/01/21 2105 August Fariha Garcia  01/01/21 2106       Lurdes Canales  01/01/21 2106

## 2021-01-02 NOTE — ED NOTES
Bed: B-05  Expected date: 1/1/21  Expected time: 8:20 PM  Means of arrival: [facility]  Comments:  Jeffrey Sparks, [address] [facility]  01/01/21 2027

## 2021-01-02 NOTE — ED PROVIDER NOTES
[address]  [address] [address]  Dept: [phone]  Loc: [phone]  eMERGENCYdEPARTMENT eNCOUnter      Pt Name: Jeri Warner  MRN: [id number]  Zule[place]agfandres 1935  Date of evaluation: 1/1/2021  Tami Galarza PA-C    CHIEF COMPLAINT       Chief Complaint   Patient presents with    Fall    Head Injury       CRITICAL CARE TIME   Total Critical Care time was 10 minutes, excluding separately reportable procedures. There was a high probability of clinically significant/life threatening deterioration in the patient's condition which required my urgentintervention. HISTORY OF PRESENT ILLNESS  (Location/Symptom, Timing/Onset, Context/Setting, Quality, Duration,Modifying Factors, Severity.)   Jeri Warner is a 82 y.o. female who presents to the emergency department by EMS following a fall at [facility]. Patient stood up to the bathroom. She states she lost her balance and fell backwards. She hit the back of her head and sustained a laceration to her head. She also complains of pain throughout back all the way to coccyx. Unsure tetanus status. Denies loss of consciousness. Denies visual changes. She said no vomiting. She has had pain to the back of her head and down her spine rated 3/10 severity. Denies any new extremity weakness/numbness/tingling, urinary/bowel continence, saddle anesthesia. Denies any chest pain, shortness of breath, pleuritic chest pain, abdominal pain, other extremity injury. Nursing Notes were reviewedand agreed with or any disagreements were addressed in the HPI. REVIEW OF SYSTEMS    (2-9 systems for level 4, 10 or more for level 5)     Review of Systems   Constitutional: Negative for chills and fever. HENT: Negative. Respiratory: Negative for chest tightness and shortness of breath. Cardiovascular: Negative. Gastrointestinal: Negative for abdominal pain, diarrhea, nausea and vomiting. Genitourinary: Negative. Musculoskeletal: Positive for arthralgias and back pain. Skin: Positive for wound. Neurological: Positive for headaches. Psychiatric/Behavioral: Negative for behavioral problems and confusion. Except as noted above the remainder of the review of systems was reviewed and negative. PAST MEDICAL HISTORY         Diagnosis Date    Hiatal hernia     High blood pressure     Parkinson disease ([address]     Suprapubic catheter ([facility] [address] [facility]        SURGICAL HISTORY           Procedure Laterality Date    ABDOMEN SURGERY      bowel obstruction    APPENDECTOMY      CYSTOSCOPY N/A 12/3/2020    CYSTOSCOPY,  WITH INTRAVESICAL INJECTION OF BOTOX 200UNITS, WITH SUPRAPUBIC TUBE EXCHANGE performed by Keeley Gracia MD at [address] 60 62 63   [facility] 56 SURGERY  2006    repair vagina bladder and rectum       CURRENT MEDICATIONS     [unfilled]    ALLERGIES     Caduet [amlodipine-atorvastatin], Estrogens conjugated, Penicillins, and Terramycin [oxytetracycline-lidocaine]    FAMILY HISTORY     History reviewed. No pertinent family history. Family Status   Relation Name Status    Mother      Father          SOCIAL HISTORY      reports that she has never smoked. She has never used smokeless tobacco. She reports current alcohol use. She reports that she does not use drugs.     PHYSICAL EXAM    (up to 7 for level 4, 8 or more for level 5)     ED Triage Vitals   Enc Vitals Group      BP 21 (!) 121/51      Pulse 21 74      Resp 21 18      Temp 21 97.9 °F (36.6 °C)      Temp Source 21 Oral      SpO2 21 95 %      Weight 21 157 lb 3 oz (71.3 kg)      Height --       Head Circumference --       Peak Flow -- Pain Score --       Pain Loc --       Pain Edu? --       Excl. in [address]? --         Physical Exam  Vitals signs reviewed. Constitutional:       Appearance: Normal appearance. HENT:      Head: Normocephalic. Neck:      Musculoskeletal: Normal range of motion and neck supple. Comments: No midline spinous process tenderness  Cardiovascular:      Rate and Rhythm: Normal rate and regular rhythm. Pulmonary:      Effort: Pulmonary effort is normal. No respiratory distress. Breath sounds: Normal breath sounds. Abdominal:      Palpations: Abdomen is soft. Tenderness: There is no abdominal tenderness. There is no guarding or rebound. Musculoskeletal:      Comments: Midline tenderness throughout lower and upper lumbar spine. BLE: L4/L5/S1 strength +5/5, patella reflexes +2, sensation intact and equal bilaterally, dorsalis pedis pulses +2, Pelvis nontender and stable to rocking, no focal tenderness to location throughout extremities  BUE: No tenderness to palpation throughout. Full range of motion present throughout   Skin:     General: Skin is warm. Neurological:      General: No focal deficit present. Mental Status: She is alert and oriented to person, place, and time. Mental status is at baseline. Cranial Nerves: No cranial nerve deficit.    Psychiatric:         Mood and Affect: Mood normal.         Behavior: Behavior normal.           DIAGNOSTIC RESULTS     EKG: All EKG's are interpreted by the Emergency Department Physician who either signs or Co-signs this chart in the absence of a cardiologist.    RADIOLOGY:   Non-plain film images such as CT, Ultrasound and MRI are read by the radiologist. Plain radiographic images are visualized and preliminarilyinterpreted by the emergency physician with the below findings:    Interpretation per the Radiologist below,if available at the time of this note:    [address]   Final Result 1. No acute fracture in the thoracic or lumbar spine. 2. Nonspecific pelvic fat stranding. Though often postoperative, the   stranding in this patient is more pronounced than typically seen. If there   are any prior outside studies comparison would be beneficial.         CT LUMBAR SPINE WO CONTRAST   Final Result   1. No acute fracture in the thoracic or lumbar spine. 2. Nonspecific pelvic fat stranding. Though often postoperative, the   stranding in this patient is more pronounced than typically seen. If there   are any prior outside studies comparison would be beneficial.         CT CERVICAL SPINE WO CONTRAST   Final Result   No acute abnormality of the cervical spine. CT HEAD WO CONTRAST   Final Result   No acute intracranial abnormality.                LABS:  Labs Reviewed   CULTURE, URINE - Abnormal; Notable for the following components:       Result Value    Organism Escherichia coli (*)     All other components within normal limits    Narrative:     ORDER#: [id number]                          ORDERED BY: DEMAR MIRANDA  SOURCE: Urine Clean Catch                  COLLECTED:  01/02/21 01:32  ANTIBIOTICS AT [place].:                      RECEIVED :  01/02/21 02:31  Performed at:  [facility]  [address] [facility] [address]   Phone [phone]   URINE RT REFLEX TO CULTURE - Abnormal; Notable for the following components:    Clarity, UA CLOUDY (*)     Blood, Urine MODERATE (*)     Nitrite, Urine POSITIVE (*)     Leukocyte Esterase, Urine LARGE (*)     All other components within normal limits    Narrative:     Performed at:  [facility]  [address] [facility] [address]   Phone [phone]   MICROSCOPIC URINALYSIS - Abnormal; Notable for the following components:    Bacteria, UA 1+ (*)     WBC, UA 52 (*)     All other components within normal limits    Narrative:     Performed at: [facility]  [address]   Phone [phone]       All other labs were within normal range or not returned as of this dictation. EMERGENCY DEPARTMENT COURSE and DIFFERENTIAL DIAGNOSIS/MDM:   Vitals:    Vitals:    01/02/21 0000 01/02/21 0030 01/02/21 0100 01/02/21 0211   BP: (!) 144/66 (!) 151/66 (!) 151/92 (!) 151/78   Pulse:   78 80   Resp:   18 18   Temp:       TempSrc:       SpO2: 93% 92% 90% 94%   Weight:           MDM     Patient presents ED with HPI noted above. She is hemodynamically stable, afebrile and nontoxic-appearing. Patient with mechanical fall at home. At time of fall or currently she denies any chest pain, shortness of breath, dizziness, syncope, lightheadedness. She is complaining of back pain and head injury following fall. There was no loss of consciousness. She just finished a course of Eliquis as she was recently diagnosed and hospitalized with a PE. She had a inferior vena cava filter placed at hospitalization. Again after thorough discussion with patient this fall was mechanical in nature. Initial exam she had pain to spinous noted above. She is neurologically intact in bilateral lower extremities. CT cervical, thoracic and lumbar spine obtained. Patient with no evidence of acute fracture to thoracic, lumbar and cervical spine. CT head without contrast showed no acute intercranial abnormality. Patient neurologically intact. Patient with laceration to left parietal region as noted above. Laceration repaired as noted below. Tetanus is not up-to-date. Patient declined tetanus in the ED. Will discuss tetanus shot with her PCP as she is getting flu vaccine this week. CT thoracic/lumbar spine does show nonspecific pelvic fat stranding. She has a benign abdomen on exam.  At reevaluation she has no focal midline spinous process tenderness or tenderness throughout back. There are no cutaneous changes. She has have a suprapubic catheter that was changed recently. Urine did appear cloudy in nature. Urine culture obtained and pending. Patient prescribed Cipro and told to contact her urologist tomorrow for further instruction. Nursing home made aware of this as well. I uncertain of exact cause of this at this time. Patient informed of finding. Return to ED if worsening back pain and abdominal pain. She is comfortable plan. Patient discharged home in stable condition. The patient tolerated their visit well. I saw the patient independently with physician available for consultation as needed. I have discussed the findings of today's workup with the patient and addressed the patient's questions and concerns. Important warning signs as well as new or worsening symptoms which would necessitate immediate return to the ED were discussed. The plan is to discharge from the ED at this time, and the patient is in stable condition. The patient acknowledged understanding is agreeable with this plan. CONSULTS:  None    PROCEDURES:  Lac Repair    Date/Time: 1/5/2021 5:21 PM  Performed by: Tania Emanuel PA-C  Authorized by: Tania Emanuel PA-C     Consent:     Consent obtained:  Verbal    Consent given by:  Patient    Risks discussed:  Infection and pain    Alternatives discussed:  No treatment  Anesthesia (see MAR for exact dosages):      Anesthesia method:  None  Laceration details:     Location:  Scalp    Scalp location:  L parietal    Length (cm):  0.5  Repair type:     Repair type:  Simple  Exploration:     Hemostasis achieved with:  Direct pressure  Treatment:     Area cleansed with:  Shur-Clens and saline    Amount of cleaning:  Standard    Irrigation solution:  Sterile saline Skin repair:     Repair method:  Staples    Number of staples:  1  Post-procedure details:     Dressing:  Non-adherent dressing    Patient tolerance of procedure: Tolerated well, no immediate complications        FINAL IMPRESSION      1. Injury of head, initial encounter    2.  Laceration of scalp, initial encounter          DISPOSITION/PLAN   [unfilled]    PATIENT REFERRED TO:  [facility] Emergency Department  [address]  [phone]  Go to   If symptoms worsen      DISCHARGE MEDICATIONS:  Discharge Medication List as of 1/2/2021  2:41 AM      START taking these medications    Details   ciprofloxacin (CIPRO) 500 MG tablet Take 1 tablet by mouth 2 times daily for 7 days, Disp-14 tablet, R-0Print             (Please note that portions of this note were completed with a voice recognition program.  Efforts were made to edit the dictations but occasionally words are mis-transcribed.)    [address] [facility]          [address] [place], [place]  01/05/21 3103

## 2021-01-02 NOTE — ED NOTES
[facility], at bedside to transport patient back to [facility] at this time.      Tia Aguilar RN  01/02/21 0229

## 2021-01-02 NOTE — ED NOTES
Unable to reach [facility] at this time for patient update. Will attempt again later.      Dawood Pisano RN  01/02/21 6129

## 2021-01-02 NOTE — ED NOTES
Patient updated called to Reinaldo Ibrahim at this time at [facility].      Antonieta Freeman RN  01/02/21 7407

## 2021-01-04 LAB
ORGANISM: ABNORMAL
URINE CULTURE, ROUTINE: ABNORMAL

## 2021-01-05 ASSESSMENT — ENCOUNTER SYMPTOMS
ABDOMINAL PAIN: 0
DIARRHEA: 0
SHORTNESS OF BREATH: 0
NAUSEA: 0
CHEST TIGHTNESS: 0
VOMITING: 0
BACK PAIN: 1

## 2021-02-26 ENCOUNTER — APPOINTMENT (OUTPATIENT)
Dept: CT IMAGING | Age: 86
DRG: 388 | End: 2021-02-26
Payer: COMMERCIAL

## 2021-02-26 ENCOUNTER — APPOINTMENT (OUTPATIENT)
Dept: GENERAL RADIOLOGY | Age: 86
DRG: 388 | End: 2021-02-26
Payer: COMMERCIAL

## 2021-02-26 ENCOUNTER — ANESTHESIA (OUTPATIENT)
Dept: ENDOSCOPY | Age: 86
DRG: 388 | End: 2021-02-26
Payer: COMMERCIAL

## 2021-02-26 ENCOUNTER — HOSPITAL ENCOUNTER (INPATIENT)
Age: 86
LOS: 7 days | Discharge: NURSING, REHAB OR LONG-TERM CARE | DRG: 388 | End: 2021-03-05
Attending: EMERGENCY MEDICINE | Admitting: HOSPITALIST
Payer: COMMERCIAL

## 2021-02-26 ENCOUNTER — ANESTHESIA EVENT (OUTPATIENT)
Dept: ENDOSCOPY | Age: 86
DRG: 388 | End: 2021-02-26
Payer: COMMERCIAL

## 2021-02-26 VITALS — SYSTOLIC BLOOD PRESSURE: 101 MMHG | DIASTOLIC BLOOD PRESSURE: 55 MMHG | OXYGEN SATURATION: 99 %

## 2021-02-26 DIAGNOSIS — K80.20 GALLSTONES: ICD-10-CM

## 2021-02-26 DIAGNOSIS — R11.2 NON-INTRACTABLE VOMITING WITH NAUSEA, UNSPECIFIED VOMITING TYPE: ICD-10-CM

## 2021-02-26 DIAGNOSIS — K44.9 HIATAL HERNIA: ICD-10-CM

## 2021-02-26 DIAGNOSIS — K92.2 ACUTE UPPER GI BLEED: ICD-10-CM

## 2021-02-26 DIAGNOSIS — K56.609 SMALL BOWEL OBSTRUCTION (HCC): Primary | ICD-10-CM

## 2021-02-26 DIAGNOSIS — K40.20 BILATERAL INGUINAL HERNIA WITHOUT OBSTRUCTION OR GANGRENE, RECURRENCE NOT SPECIFIED: ICD-10-CM

## 2021-02-26 DIAGNOSIS — I82.411: ICD-10-CM

## 2021-02-26 DIAGNOSIS — R10.33 PERIUMBILICAL ABDOMINAL PAIN: ICD-10-CM

## 2021-02-26 PROBLEM — I82.419: Status: ACTIVE | Noted: 2021-02-26

## 2021-02-26 PROBLEM — Z86.711 HISTORY OF PULMONARY EMBOLISM: Status: ACTIVE | Noted: 2021-02-26

## 2021-02-26 LAB
A/G RATIO: 1 (ref 1.1–2.2)
ALBUMIN SERPL-MCNC: 3.6 G/DL (ref 3.4–5)
ALP BLD-CCNC: 102 U/L (ref 40–129)
ALT SERPL-CCNC: 17 U/L (ref 10–40)
ANION GAP SERPL CALCULATED.3IONS-SCNC: 11 MMOL/L (ref 3–16)
APTT: 36.4 SEC (ref 24.2–36.2)
AST SERPL-CCNC: 19 U/L (ref 15–37)
BACTERIA: ABNORMAL /HPF
BASOPHILS ABSOLUTE: 0 K/UL (ref 0–0.2)
BASOPHILS ABSOLUTE: 0 K/UL (ref 0–0.2)
BASOPHILS RELATIVE PERCENT: 0.2 %
BASOPHILS RELATIVE PERCENT: 0.3 %
BILIRUB SERPL-MCNC: <0.2 MG/DL (ref 0–1)
BILIRUBIN URINE: NEGATIVE
BLOOD, URINE: ABNORMAL
BUN BLDV-MCNC: 19 MG/DL (ref 7–20)
CALCIUM SERPL-MCNC: 9 MG/DL (ref 8.3–10.6)
CHLORIDE BLD-SCNC: 98 MMOL/L (ref 99–110)
CLARITY: ABNORMAL
CO2: 24 MMOL/L (ref 21–32)
COLOR: ABNORMAL
CREAT SERPL-MCNC: <0.5 MG/DL (ref 0.6–1.2)
CRYSTALS, UA: ABNORMAL /HPF
EKG ATRIAL RATE: 84 BPM
EKG DIAGNOSIS: NORMAL
EKG P AXIS: 33 DEGREES
EKG P-R INTERVAL: 158 MS
EKG Q-T INTERVAL: 390 MS
EKG QRS DURATION: 74 MS
EKG QTC CALCULATION (BAZETT): 460 MS
EKG R AXIS: -26 DEGREES
EKG T AXIS: 21 DEGREES
EKG VENTRICULAR RATE: 84 BPM
EOSINOPHILS ABSOLUTE: 0 K/UL (ref 0–0.6)
EOSINOPHILS ABSOLUTE: 0 K/UL (ref 0–0.6)
EOSINOPHILS RELATIVE PERCENT: 0 %
EOSINOPHILS RELATIVE PERCENT: 0.1 %
EPITHELIAL CELLS, UA: 7 /HPF (ref 0–5)
GFR AFRICAN AMERICAN: >60
GFR NON-AFRICAN AMERICAN: >60
GLOBULIN: 3.6 G/DL
GLUCOSE BLD-MCNC: 180 MG/DL (ref 70–99)
GLUCOSE URINE: NEGATIVE MG/DL
HCT VFR BLD CALC: 31.7 % (ref 36–48)
HCT VFR BLD CALC: 31.7 % (ref 36–48)
HCT VFR BLD CALC: 32.2 % (ref 36–48)
HCT VFR BLD CALC: 35.2 % (ref 36–48)
HEMOGLOBIN: 10.3 G/DL (ref 12–16)
HEMOGLOBIN: 10.4 G/DL (ref 12–16)
HEMOGLOBIN: 10.6 G/DL (ref 12–16)
HEMOGLOBIN: 11.8 G/DL (ref 12–16)
INR BLD: 1.54 (ref 0.86–1.14)
KETONES, URINE: ABNORMAL MG/DL
LACTIC ACID: 1.1 MMOL/L (ref 0.4–2)
LACTIC ACID: 1.2 MMOL/L (ref 0.4–2)
LEUKOCYTE ESTERASE, URINE: ABNORMAL
LIPASE: 10 U/L (ref 13–60)
LYMPHOCYTES ABSOLUTE: 1.1 K/UL (ref 1–5.1)
LYMPHOCYTES ABSOLUTE: 1.2 K/UL (ref 1–5.1)
LYMPHOCYTES RELATIVE PERCENT: 11 %
LYMPHOCYTES RELATIVE PERCENT: 13.7 %
MCH RBC QN AUTO: 28.3 PG (ref 26–34)
MCH RBC QN AUTO: 29 PG (ref 26–34)
MCHC RBC AUTO-ENTMCNC: 32.9 G/DL (ref 31–36)
MCHC RBC AUTO-ENTMCNC: 33.7 G/DL (ref 31–36)
MCV RBC AUTO: 86 FL (ref 80–100)
MCV RBC AUTO: 86.2 FL (ref 80–100)
MICROSCOPIC EXAMINATION: YES
MONOCYTES ABSOLUTE: 0.4 K/UL (ref 0–1.3)
MONOCYTES ABSOLUTE: 0.4 K/UL (ref 0–1.3)
MONOCYTES RELATIVE PERCENT: 4.1 %
MONOCYTES RELATIVE PERCENT: 4.9 %
NEUTROPHILS ABSOLUTE: 6.9 K/UL (ref 1.7–7.7)
NEUTROPHILS ABSOLUTE: 8.3 K/UL (ref 1.7–7.7)
NEUTROPHILS RELATIVE PERCENT: 81.2 %
NEUTROPHILS RELATIVE PERCENT: 84.5 %
NITRITE, URINE: POSITIVE
OCCULT BLOOD DIAGNOSTIC: NORMAL
PDW BLD-RTO: 15.3 % (ref 12.4–15.4)
PDW BLD-RTO: 15.7 % (ref 12.4–15.4)
PH UA: 6 (ref 5–8)
PLATELET # BLD: 276 K/UL (ref 135–450)
PLATELET # BLD: 281 K/UL (ref 135–450)
PMV BLD AUTO: 6.7 FL (ref 5–10.5)
PMV BLD AUTO: 7 FL (ref 5–10.5)
POTASSIUM REFLEX MAGNESIUM: 4.1 MMOL/L (ref 3.5–5.1)
PROTEIN UA: 100 MG/DL
PROTHROMBIN TIME: 18 SEC (ref 10–13.2)
RBC # BLD: 3.73 M/UL (ref 4–5.2)
RBC # BLD: 4.09 M/UL (ref 4–5.2)
RBC UA: >100 /HPF (ref 0–4)
SARS-COV-2, NAAT: NOT DETECTED
SODIUM BLD-SCNC: 133 MMOL/L (ref 136–145)
SPECIFIC GRAVITY UA: 1.03 (ref 1–1.03)
TOTAL PROTEIN: 7.2 G/DL (ref 6.4–8.2)
URINE REFLEX TO CULTURE: YES
URINE TYPE: ABNORMAL
UROBILINOGEN, URINE: 1 E.U./DL
WBC # BLD: 8.5 K/UL (ref 4–11)
WBC # BLD: 9.8 K/UL (ref 4–11)
WBC UA: >900 /HPF (ref 0–5)

## 2021-02-26 PROCEDURE — 6360000002 HC RX W HCPCS: Performed by: EMERGENCY MEDICINE

## 2021-02-26 PROCEDURE — 85025 COMPLETE CBC W/AUTO DIFF WBC: CPT

## 2021-02-26 PROCEDURE — 2580000003 HC RX 258: Performed by: NURSE PRACTITIONER

## 2021-02-26 PROCEDURE — 2580000003 HC RX 258: Performed by: EMERGENCY MEDICINE

## 2021-02-26 PROCEDURE — 6360000002 HC RX W HCPCS: Performed by: NURSE PRACTITIONER

## 2021-02-26 PROCEDURE — 85730 THROMBOPLASTIN TIME PARTIAL: CPT

## 2021-02-26 PROCEDURE — 85014 HEMATOCRIT: CPT

## 2021-02-26 PROCEDURE — 87077 CULTURE AEROBIC IDENTIFY: CPT

## 2021-02-26 PROCEDURE — 83605 ASSAY OF LACTIC ACID: CPT

## 2021-02-26 PROCEDURE — 6360000004 HC RX CONTRAST MEDICATION: Performed by: EMERGENCY MEDICINE

## 2021-02-26 PROCEDURE — 3609012400 HC EGD TRANSORAL BIOPSY SINGLE/MULTIPLE: Performed by: INTERNAL MEDICINE

## 2021-02-26 PROCEDURE — 6360000002 HC RX W HCPCS: Performed by: STUDENT IN AN ORGANIZED HEALTH CARE EDUCATION/TRAINING PROGRAM

## 2021-02-26 PROCEDURE — 2580000003 HC RX 258: Performed by: NURSE ANESTHETIST, CERTIFIED REGISTERED

## 2021-02-26 PROCEDURE — 7100000000 HC PACU RECOVERY - FIRST 15 MIN: Performed by: INTERNAL MEDICINE

## 2021-02-26 PROCEDURE — 87635 SARS-COV-2 COVID-19 AMP PRB: CPT

## 2021-02-26 PROCEDURE — G0328 FECAL BLOOD SCRN IMMUNOASSAY: HCPCS

## 2021-02-26 PROCEDURE — 3700000000 HC ANESTHESIA ATTENDED CARE: Performed by: INTERNAL MEDICINE

## 2021-02-26 PROCEDURE — C9113 INJ PANTOPRAZOLE SODIUM, VIA: HCPCS | Performed by: NURSE PRACTITIONER

## 2021-02-26 PROCEDURE — 1200000000 HC SEMI PRIVATE

## 2021-02-26 PROCEDURE — 93010 ELECTROCARDIOGRAM REPORT: CPT | Performed by: INTERNAL MEDICINE

## 2021-02-26 PROCEDURE — 88305 TISSUE EXAM BY PATHOLOGIST: CPT

## 2021-02-26 PROCEDURE — 0D9670Z DRAINAGE OF STOMACH WITH DRAINAGE DEVICE, VIA NATURAL OR ARTIFICIAL OPENING: ICD-10-PCS | Performed by: EMERGENCY MEDICINE

## 2021-02-26 PROCEDURE — 7100000001 HC PACU RECOVERY - ADDTL 15 MIN: Performed by: INTERNAL MEDICINE

## 2021-02-26 PROCEDURE — 0DB68ZX EXCISION OF STOMACH, VIA NATURAL OR ARTIFICIAL OPENING ENDOSCOPIC, DIAGNOSTIC: ICD-10-PCS | Performed by: INTERNAL MEDICINE

## 2021-02-26 PROCEDURE — 71045 X-RAY EXAM CHEST 1 VIEW: CPT

## 2021-02-26 PROCEDURE — 96365 THER/PROPH/DIAG IV INF INIT: CPT

## 2021-02-26 PROCEDURE — 99222 1ST HOSP IP/OBS MODERATE 55: CPT | Performed by: SURGERY

## 2021-02-26 PROCEDURE — 74177 CT ABD & PELVIS W/CONTRAST: CPT

## 2021-02-26 PROCEDURE — 6360000002 HC RX W HCPCS: Performed by: HOSPITALIST

## 2021-02-26 PROCEDURE — 96368 THER/DIAG CONCURRENT INF: CPT

## 2021-02-26 PROCEDURE — 96366 THER/PROPH/DIAG IV INF ADDON: CPT

## 2021-02-26 PROCEDURE — 80053 COMPREHEN METABOLIC PANEL: CPT

## 2021-02-26 PROCEDURE — 36415 COLL VENOUS BLD VENIPUNCTURE: CPT

## 2021-02-26 PROCEDURE — 2500000003 HC RX 250 WO HCPCS: Performed by: STUDENT IN AN ORGANIZED HEALTH CARE EDUCATION/TRAINING PROGRAM

## 2021-02-26 PROCEDURE — 3700000001 HC ADD 15 MINUTES (ANESTHESIA): Performed by: INTERNAL MEDICINE

## 2021-02-26 PROCEDURE — 2709999900 HC NON-CHARGEABLE SUPPLY: Performed by: INTERNAL MEDICINE

## 2021-02-26 PROCEDURE — 85018 HEMOGLOBIN: CPT

## 2021-02-26 PROCEDURE — 87186 SC STD MICRODIL/AGAR DIL: CPT

## 2021-02-26 PROCEDURE — 6370000000 HC RX 637 (ALT 250 FOR IP): Performed by: HOSPITALIST

## 2021-02-26 PROCEDURE — 85610 PROTHROMBIN TIME: CPT

## 2021-02-26 PROCEDURE — 81001 URINALYSIS AUTO W/SCOPE: CPT

## 2021-02-26 PROCEDURE — 2580000003 HC RX 258: Performed by: HOSPITALIST

## 2021-02-26 PROCEDURE — 96375 TX/PRO/DX INJ NEW DRUG ADDON: CPT

## 2021-02-26 PROCEDURE — 93005 ELECTROCARDIOGRAM TRACING: CPT | Performed by: NURSE PRACTITIONER

## 2021-02-26 PROCEDURE — 83690 ASSAY OF LIPASE: CPT

## 2021-02-26 PROCEDURE — 99284 EMERGENCY DEPT VISIT MOD MDM: CPT

## 2021-02-26 PROCEDURE — 94760 N-INVAS EAR/PLS OXIMETRY 1: CPT

## 2021-02-26 PROCEDURE — 87086 URINE CULTURE/COLONY COUNT: CPT

## 2021-02-26 PROCEDURE — 96367 TX/PROPH/DG ADDL SEQ IV INF: CPT

## 2021-02-26 PROCEDURE — 6360000002 HC RX W HCPCS: Performed by: NURSE ANESTHETIST, CERTIFIED REGISTERED

## 2021-02-26 RX ORDER — SODIUM CHLORIDE 9 MG/ML
INJECTION, SOLUTION INTRAVENOUS CONTINUOUS
Status: DISCONTINUED | OUTPATIENT
Start: 2021-02-26 | End: 2021-02-26

## 2021-02-26 RX ORDER — HEPARIN SODIUM 1000 [USP'U]/ML
60 INJECTION, SOLUTION INTRAVENOUS; SUBCUTANEOUS PRN
Status: DISCONTINUED | OUTPATIENT
Start: 2021-02-26 | End: 2021-02-26

## 2021-02-26 RX ORDER — ONDANSETRON 2 MG/ML
4 INJECTION INTRAMUSCULAR; INTRAVENOUS ONCE
Status: COMPLETED | OUTPATIENT
Start: 2021-02-26 | End: 2021-02-26

## 2021-02-26 RX ORDER — SODIUM CHLORIDE 0.9 % (FLUSH) 0.9 %
10 SYRINGE (ML) INJECTION EVERY 12 HOURS SCHEDULED
Status: DISCONTINUED | OUTPATIENT
Start: 2021-02-26 | End: 2021-03-05 | Stop reason: HOSPADM

## 2021-02-26 RX ORDER — SODIUM CHLORIDE 0.9 % (FLUSH) 0.9 %
10 SYRINGE (ML) INJECTION PRN
Status: DISCONTINUED | OUTPATIENT
Start: 2021-02-26 | End: 2021-03-05 | Stop reason: HOSPADM

## 2021-02-26 RX ORDER — SODIUM CHLORIDE 9 MG/ML
INJECTION, SOLUTION INTRAVENOUS CONTINUOUS PRN
Status: DISCONTINUED | OUTPATIENT
Start: 2021-02-26 | End: 2021-02-26 | Stop reason: SDUPTHER

## 2021-02-26 RX ORDER — ACETAMINOPHEN 325 MG/1
650 TABLET ORAL EVERY 6 HOURS PRN
Status: DISCONTINUED | OUTPATIENT
Start: 2021-02-26 | End: 2021-03-05 | Stop reason: HOSPADM

## 2021-02-26 RX ORDER — CARBIDOPA AND LEVODOPA 50; 200 MG/1; MG/1
1 TABLET, EXTENDED RELEASE ORAL NIGHTLY
COMMUNITY

## 2021-02-26 RX ORDER — SODIUM CHLORIDE 0.9 % (FLUSH) 0.9 %
10 SYRINGE (ML) INJECTION PRN
Status: DISCONTINUED | OUTPATIENT
Start: 2021-02-26 | End: 2021-03-03

## 2021-02-26 RX ORDER — HEPARIN SODIUM 1000 [USP'U]/ML
4200 INJECTION, SOLUTION INTRAVENOUS; SUBCUTANEOUS ONCE
Status: COMPLETED | OUTPATIENT
Start: 2021-02-26 | End: 2021-02-26

## 2021-02-26 RX ORDER — PROPOFOL 10 MG/ML
INJECTION, EMULSION INTRAVENOUS PRN
Status: DISCONTINUED | OUTPATIENT
Start: 2021-02-26 | End: 2021-02-26 | Stop reason: SDUPTHER

## 2021-02-26 RX ORDER — HEPARIN SODIUM 1000 [USP'U]/ML
30 INJECTION, SOLUTION INTRAVENOUS; SUBCUTANEOUS PRN
Status: DISCONTINUED | OUTPATIENT
Start: 2021-02-26 | End: 2021-02-26

## 2021-02-26 RX ORDER — HEPARIN SODIUM 10000 [USP'U]/100ML
5-30 INJECTION, SOLUTION INTRAVENOUS CONTINUOUS
Status: DISCONTINUED | OUTPATIENT
Start: 2021-02-26 | End: 2021-02-26

## 2021-02-26 RX ORDER — ONDANSETRON 2 MG/ML
4 INJECTION INTRAMUSCULAR; INTRAVENOUS
Status: ACTIVE | OUTPATIENT
Start: 2021-02-26 | End: 2021-02-26

## 2021-02-26 RX ORDER — PROMETHAZINE HYDROCHLORIDE 25 MG/1
12.5 TABLET ORAL EVERY 6 HOURS PRN
Status: DISCONTINUED | OUTPATIENT
Start: 2021-02-26 | End: 2021-02-26

## 2021-02-26 RX ORDER — HEPARIN SODIUM 10000 [USP'U]/100ML
9.5 INJECTION, SOLUTION INTRAVENOUS CONTINUOUS
Status: DISCONTINUED | OUTPATIENT
Start: 2021-02-26 | End: 2021-02-26

## 2021-02-26 RX ORDER — ACETAMINOPHEN 650 MG/1
650 SUPPOSITORY RECTAL EVERY 6 HOURS PRN
Status: DISCONTINUED | OUTPATIENT
Start: 2021-02-26 | End: 2021-03-05 | Stop reason: HOSPADM

## 2021-02-26 RX ORDER — RIVASTIGMINE TARTRATE 1.5 MG/1
1.5 CAPSULE ORAL 2 TIMES DAILY
COMMUNITY
Start: 2021-02-25

## 2021-02-26 RX ORDER — PROPOFOL 10 MG/ML
INJECTION, EMULSION INTRAVENOUS CONTINUOUS PRN
Status: DISCONTINUED | OUTPATIENT
Start: 2021-02-26 | End: 2021-02-26 | Stop reason: SDUPTHER

## 2021-02-26 RX ORDER — MIRTAZAPINE 15 MG/1
7.5 TABLET, FILM COATED ORAL NIGHTLY
COMMUNITY

## 2021-02-26 RX ORDER — ONDANSETRON 2 MG/ML
4 INJECTION INTRAMUSCULAR; INTRAVENOUS EVERY 6 HOURS PRN
Status: DISCONTINUED | OUTPATIENT
Start: 2021-02-26 | End: 2021-02-26

## 2021-02-26 RX ORDER — SODIUM CHLORIDE 9 MG/ML
INJECTION, SOLUTION INTRAVENOUS CONTINUOUS
Status: DISCONTINUED | OUTPATIENT
Start: 2021-02-26 | End: 2021-02-26 | Stop reason: SDUPTHER

## 2021-02-26 RX ORDER — M-VIT,TX,IRON,MINS/CALC/FOLIC 27MG-0.4MG
1 TABLET ORAL DAILY
COMMUNITY

## 2021-02-26 RX ORDER — SODIUM CHLORIDE 0.9 % (FLUSH) 0.9 %
10 SYRINGE (ML) INJECTION EVERY 12 HOURS SCHEDULED
Status: DISCONTINUED | OUTPATIENT
Start: 2021-02-26 | End: 2021-03-03

## 2021-02-26 RX ORDER — SODIUM CHLORIDE 9 MG/ML
INJECTION, SOLUTION INTRAVENOUS CONTINUOUS
Status: DISCONTINUED | OUTPATIENT
Start: 2021-02-26 | End: 2021-03-04

## 2021-02-26 RX ORDER — PROMETHAZINE HYDROCHLORIDE 25 MG/1
12.5 TABLET ORAL EVERY 6 HOURS PRN
Status: DISCONTINUED | OUTPATIENT
Start: 2021-02-26 | End: 2021-03-05 | Stop reason: HOSPADM

## 2021-02-26 RX ORDER — CALCIUM CARBONATE/VITAMIN D3 500-10/5ML
400 LIQUID (ML) ORAL DAILY
COMMUNITY

## 2021-02-26 RX ORDER — 0.9 % SODIUM CHLORIDE 0.9 %
1000 INTRAVENOUS SOLUTION INTRAVENOUS ONCE
Status: COMPLETED | OUTPATIENT
Start: 2021-02-26 | End: 2021-02-26

## 2021-02-26 RX ORDER — ONDANSETRON 2 MG/ML
4 INJECTION INTRAMUSCULAR; INTRAVENOUS EVERY 4 HOURS PRN
Status: DISCONTINUED | OUTPATIENT
Start: 2021-02-26 | End: 2021-03-05 | Stop reason: HOSPADM

## 2021-02-26 RX ORDER — DIBUCAINE 0.28 G/28G
OINTMENT TOPICAL 3 TIMES DAILY PRN
COMMUNITY

## 2021-02-26 RX ORDER — HEPARIN SODIUM 1000 [USP'U]/ML
60 INJECTION, SOLUTION INTRAVENOUS; SUBCUTANEOUS ONCE
Status: DISCONTINUED | OUTPATIENT
Start: 2021-02-26 | End: 2021-02-26

## 2021-02-26 RX ADMIN — ONDANSETRON 4 MG: 2 INJECTION INTRAMUSCULAR; INTRAVENOUS at 10:16

## 2021-02-26 RX ADMIN — ONDANSETRON 4 MG: 2 INJECTION INTRAMUSCULAR; INTRAVENOUS at 14:18

## 2021-02-26 RX ADMIN — SODIUM CHLORIDE: 9 INJECTION, SOLUTION INTRAVENOUS at 15:54

## 2021-02-26 RX ADMIN — PANTOPRAZOLE SODIUM 8 MG/HR: 40 INJECTION, POWDER, FOR SOLUTION INTRAVENOUS at 08:11

## 2021-02-26 RX ADMIN — CEFTRIAXONE 1000 MG: 1 INJECTION, POWDER, FOR SOLUTION INTRAMUSCULAR; INTRAVENOUS at 03:47

## 2021-02-26 RX ADMIN — PROPOFOL 40 MG: 10 INJECTION, EMULSION INTRAVENOUS at 16:06

## 2021-02-26 RX ADMIN — PHENOL 1 SPRAY: 1.5 LIQUID ORAL at 18:59

## 2021-02-26 RX ADMIN — IOPAMIDOL 75 ML: 755 INJECTION, SOLUTION INTRAVENOUS at 01:59

## 2021-02-26 RX ADMIN — PANTOPRAZOLE SODIUM 80 MG: 40 INJECTION, POWDER, FOR SOLUTION INTRAVENOUS at 00:57

## 2021-02-26 RX ADMIN — ONDANSETRON 4 MG: 2 INJECTION INTRAMUSCULAR; INTRAVENOUS at 00:43

## 2021-02-26 RX ADMIN — HEPARIN SODIUM 4200 UNITS: 1000 INJECTION INTRAVENOUS; SUBCUTANEOUS at 05:40

## 2021-02-26 RX ADMIN — PROPOFOL 20 MG: 10 INJECTION, EMULSION INTRAVENOUS at 16:11

## 2021-02-26 RX ADMIN — PROPOFOL 20 MG: 10 INJECTION, EMULSION INTRAVENOUS at 16:08

## 2021-02-26 RX ADMIN — PANTOPRAZOLE SODIUM 8 MG/HR: 40 INJECTION, POWDER, FOR SOLUTION INTRAVENOUS at 19:37

## 2021-02-26 RX ADMIN — PROPOFOL 100 MCG/KG/MIN: 10 INJECTION, EMULSION INTRAVENOUS at 16:13

## 2021-02-26 RX ADMIN — PANTOPRAZOLE SODIUM 8 MG/HR: 40 INJECTION, POWDER, FOR SOLUTION INTRAVENOUS at 01:00

## 2021-02-26 RX ADMIN — HEPARIN SODIUM 9.5 ML/HR: 10000 INJECTION, SOLUTION INTRAVENOUS at 05:40

## 2021-02-26 RX ADMIN — SODIUM CHLORIDE: 9 INJECTION, SOLUTION INTRAVENOUS at 09:58

## 2021-02-26 RX ADMIN — SODIUM CHLORIDE 1000 ML: 9 INJECTION, SOLUTION INTRAVENOUS at 00:47

## 2021-02-26 ASSESSMENT — ENCOUNTER SYMPTOMS
EYE ITCHING: 0
COUGH: 0
COLOR CHANGE: 0
CONSTIPATION: 1
VOMITING: 1
NAUSEA: 1
ABDOMINAL PAIN: 1
EYE DISCHARGE: 0
SHORTNESS OF BREATH: 0

## 2021-02-26 ASSESSMENT — PAIN DESCRIPTION - PROGRESSION: CLINICAL_PROGRESSION: GRADUALLY WORSENING

## 2021-02-26 ASSESSMENT — PULMONARY FUNCTION TESTS
PIF_VALUE: 0

## 2021-02-26 ASSESSMENT — PAIN DESCRIPTION - DESCRIPTORS
DESCRIPTORS: CRAMPING;CONSTANT
DESCRIPTORS: CRAMPING;CONSTANT

## 2021-02-26 ASSESSMENT — PAIN SCALES - GENERAL
PAINLEVEL_OUTOF10: 8
PAINLEVEL_OUTOF10: 0

## 2021-02-26 ASSESSMENT — PAIN DESCRIPTION - LOCATION: LOCATION: ABDOMEN

## 2021-02-26 ASSESSMENT — PAIN DESCRIPTION - PAIN TYPE: TYPE: ACUTE PAIN

## 2021-02-26 ASSESSMENT — PAIN - FUNCTIONAL ASSESSMENT: PAIN_FUNCTIONAL_ASSESSMENT: PREVENTS OR INTERFERES SOME ACTIVE ACTIVITIES AND ADLS

## 2021-02-26 ASSESSMENT — LIFESTYLE VARIABLES: SMOKING_STATUS: 0

## 2021-02-26 ASSESSMENT — PAIN DESCRIPTION - FREQUENCY: FREQUENCY: CONTINUOUS

## 2021-02-26 NOTE — CONSULTS
PATIENT NAME: Miguel Ángel العراقي   YOB: 1935    ADMISSION DATE: 2/26/2021 12:21 AM      TODAY'S DATE: 2/26/2021    CHIEF COMPLAINT:  nausea      HISTORY OF PRESENT ILLNESS:  The patient is a 82 y.o. female  who presents with abdominal distention, nausea and vomiting. Started last night after dinner at [facility]. Seen in ER overnight with admission for SBO and possible UGI bleed. Reports of coffee ground emesis from ECF. NG placed with dark red/brown fluid. Had been on Eliquis for DVT. EGD today with gastritis of less curve. Plan non operative management of SBO for now. Past Medical History:        Diagnosis Date    Contraindication to anticoagulation therapy     fall risk    Hiatal hernia     High blood pressure     Neurogenic bladder     Parkinson disease ([facility] [address]     pulmonary embolism 12/13/2020    right upper and lower lobes    Suprapubic catheter [facility])        Past Surgical History:        Procedure Laterality Date    ABDOMEN SURGERY      bowel obstruction    APPENDECTOMY  1948    CYSTOSCOPY N/A 12/03/2020    CYSTOSCOPY,  WITH INTRAVESICAL INJECTION OF BOTOX 200UNITS, WITH SUPRAPUBIC TUBE EXCHANGE performed by Jerome Gonzalez MD at [address] [facility]    IVC FILTER INSERTION Right 12/17/2020    Dr. Doug Cuellar.  Towner retreivable IVC filter    TONSILLECTOMY AND ADENOIDECTOMY  1943    VAGINA SURGERY  2006    repair vagina bladder and rectum       Medications Prior to Admission:   Medications Prior to Admission: rivastigmine (EXELON) 1.5 MG capsule, Take 1.5 mg by mouth 2 times daily  mirtazapine (REMERON) 15 MG tablet, Take 7.5 mg by mouth nightly  dibucaine (NUPERCAINAL) 1 % ointment, Apply topically 3 times daily as needed (Hemorrhoids) Apply dime size amount to external hemorrhoids  apixaban (ELIQUIS) 5 MG TABS tablet, Take 5 mg by mouth 2 times daily  carbidopa-levodopa (SINEMET CR)  MG per extended release tablet, history of drug use. Family History:   No family history on file. REVIEW OF SYSTEMS:    CONSTITUTIONAL:  negative  HEENT:  negative  CARDIOVASCULAR:  negative  GASTROINTESTINAL:  positive for nausea, vomiting and abdominal pain  GENITOURINARY:  negative  HEMATOLOGIC/LYMPHATIC:  negative  ENDOCRINE:  negative  All other systems negative    PHYSICAL EXAM:    VITALS:  BP (!) 149/62   Pulse 76   Temp 96.7 °F (35.9 °C) (Temporal)   Resp 17   Ht 4' 11\" (1.499 m)   Wt 145 lb 4.5 oz (65.9 kg)   SpO2 93%   BMI 29.34 kg/m²   INTAKE/OUTPUT:   I/O last 3 completed shifts: In: 496.7 [I.V.:496.7]  Out: 1150 [Urine:450; Emesis/NG output:700]  I/O this shift:  In: 200 [I.V.:200]  Out: -   CONSTITUTIONAL:  awake, alert, no apparent distress and normal weight  ENT:  normocepalic, without obvious abnormality  NECK:  supple, symmetrical, trachea midline   LUNGS:  clear to auscultation, no crackles or wheezing  CARDIOVASCULAR:  regular rate and rhythm and no murmur noted  ABDOMEN:  Distended but soft, non tender. No masses.  NG in place  MUSCULOSKELETAL:  0+ pitting edema lower extremities  NEUROLOGIC:  Mental Status Exam:  Level of Alertness:   awake  Orientation:   person, place, time      ASSESSMENT AND PLAN:    Small bowel obstruction   On CT it appears to have a transition in pelvis   Continue NG for now   Serial exams and X rays    UGI bleed   EGD noted today   PPI   Hold anticoagulation   Serial Hg, stable throughout the day today    Electronically signed by Cristal Brannon MD on 2/26/2021 at 4:55 PM      Cristal Brannon

## 2021-02-26 NOTE — ED NOTES
Bed: B-07  Expected date:   Expected time:   Means of arrival:   Comments:  Medic 12 86yo abd pain vomiting     Segundo Wei, RAHEEM  02/26/21 0021

## 2021-02-26 NOTE — ED NOTES
ED SBAR report provider to [facility]. Patient to be transported to Room 5281 via stretcher by ED tech. Patient transported with bedside cardiac monitor and with IV medications infusing. IV site clean, dry, and intact. MEWS score and pain assessed as 1 and documented. Updated patient on plan of care.        Jesse Sainz RN  02/26/21 7038

## 2021-02-26 NOTE — PROGRESS NOTES
Pt NG tube appears to be putting out bloody secretions. Heparin drip stopped per nursing communication order. MD secure messaged. Awaiting response.   Electronically signed by Du Sherman RN on 2/26/2021 at 7:01 AM

## 2021-02-26 NOTE — ANESTHESIA PRE PROCEDURE
[facility] Department of Anesthesiology  Pre-Anesthesia Evaluation/Consultation       Name:  Sadia Villegas  : 1935  Age:  82 y.o. MRN:  [id number]  Date: 2021           Surgeon: Surgeon(s):  Jesus King MD    Procedure: Procedure(s):  EGD DIAGNOSTIC ONLY     Allergies   Allergen Reactions    Caduet [Amlodipine-Atorvastatin]     Estrogens Conjugated Other (See Comments)     lightheaded    Penicillins Rash    Terramycin [Oxytetracycline-Lidocaine] Rash     Patient Active Problem List   Diagnosis    Pulmonary embolism without acute cor pulmonale ([address]    SBO (small bowel obstruction) ([address]    Femoral vein thrombosis (HCC)    Nausea and vomiting    History of pulmonary embolism     Past Medical History:   Diagnosis Date    Contraindication to anticoagulation therapy     fall risk    Hiatal hernia     High blood pressure     Neurogenic bladder     Parkinson disease ([address]     pulmonary embolism 2020    right upper and lower lobes    [address]rapubic catheter [address]      Past Surgical History:   Procedure Laterality Date    ABDOMEN SURGERY      bowel obstruction    APPENDECTOMY      CYSTOSCOPY N/A 2020    CYSTOSCOPY,  WITH INTRAVESICAL INJECTION OF BOTOX 200UNITS, WITH SUPRAPUBIC TUBE EXCHANGE performed by Toño Coley MD at [address] [facility] [address]    IVC FILTER INSERTION Right 2020    Dr. Severino Jean.  [place] retreivable IVC filter    TONSILLECTOMY AND ADENOIDECTOMY  1943    VAGINA SURGERY  2006    repair vagina bladder and rectum     Social History     Tobacco Use    Smoking status: Never Smoker    Smokeless tobacco: Never Used   Substance Use Topics    Alcohol use: Yes     Comment: occasional    Drug use: No     Medications  Current Facility-Administered Medications on File Prior to Visit Medication Dose Route Frequency Provider Last Rate Last Admin    pantoprazole (PROTONIX) 80 mg in sodium chloride 0.9 % 100 mL infusion  8 mg/hr Intravenous Continuous WILMAR Schmid - CNP 10 mL/hr at 02/26/21 0811 8 mg/hr at 02/26/21 0811    sodium chloride flush 0.9 % injection 10 mL  10 mL Intravenous 2 times per day Eric M Inocente, DO        sodium chloride flush 0.9 % injection 10 mL  10 mL Intravenous PRN Eric M Inocente, DO        acetaminophen (TYLENOL) tablet 650 mg  650 mg Oral Q6H PRN Eric M Inocente, DO        Or    acetaminophen (TYLENOL) suppository 650 mg  650 mg Rectal Q6H PRN Eric M Inocente, DO        0.9 % sodium chloride infusion   Intravenous Continuous Nilam Mcqueen  mL/hr at 02/26/21 0958 New Bag at 02/26/21 0958    sodium chloride flush 0.9 % injection 10 mL  10 mL Intravenous 2 times per day Birdie Mcburney, MD        sodium chloride flush 0.9 % injection 10 mL  10 mL Intravenous PRN Birdie Mcburney, MD        sodium chloride flush 0.9 % injection 10 mL  10 mL Intravenous 2 times per day Birdie Mcburney, MD        sodium chloride flush 0.9 % injection 10 mL  10 mL Intravenous PRN Birdie Mcburney, MD        [START ON 2/27/2021] cefTRIAXone (ROCEPHIN) 1000 mg IVPB in 50 mL D5W minibag  1,000 mg Intravenous Q24H Nilam Mcqueen MD        phenol 1.4 % mouth spray 1 spray  1 spray Mouth/Throat Q2H PRN Nilam Mcqueen MD        ondansetron [facility]) injection 4 mg  4 mg Intravenous Q4H PRN Nilam Mcqueen MD   4 mg at 02/26/21 1418    Or    promethazine (PHENERGAN) tablet 12.5 mg  12.5 mg Oral Q6H PRN Nilam Mcqueen MD         Current Outpatient Medications on File Prior to Visit   Medication Sig Dispense Refill    rivastigmine (EXELON) 1.5 MG capsule Take 1.5 mg by mouth 2 times daily      mirtazapine (REMERON) 15 MG tablet Take 7.5 mg by mouth nightly No current facility-administered medications for this visit. No current outpatient medications on file. Facility-Administered Medications Ordered in Other Visits   Medication Dose Route Frequency Provider Last Rate Last Admin    pantoprazole (PROTONIX) 80 mg in sodium chloride 0.9 % 100 mL infusion  8 mg/hr Intravenous Continuous WILMAR Westbrook - CNP 10 mL/hr at 02/26/21 0811 8 mg/hr at 02/26/21 0811    sodium chloride flush 0.9 % injection 10 mL  10 mL Intravenous 2 times per day Eric M Inocente, DO        sodium chloride flush 0.9 % injection 10 mL  10 mL Intravenous PRN Eric M Inocente, DO        acetaminophen (TYLENOL) tablet 650 mg  650 mg Oral Q6H PRN Eric M Inocente, DO        Or    acetaminophen (TYLENOL) suppository 650 mg  650 mg Rectal Q6H PRN Eric M Inocente, DO        0.9 % sodium chloride infusion   Intravenous Continuous Julio Jacob  mL/hr at 02/26/21 0958 New Bag at 02/26/21 0958    sodium chloride flush 0.9 % injection 10 mL  10 mL Intravenous 2 times per day Hilda Shane MD        sodium chloride flush 0.9 % injection 10 mL  10 mL Intravenous PRN Hilda Shane MD        sodium chloride flush 0.9 % injection 10 mL  10 mL Intravenous 2 times per day Hilda Shane MD        sodium chloride flush 0.9 % injection 10 mL  10 mL Intravenous PRN Hilda Shane MD        [START ON 2/27/2021] cefTRIAXone (ROCEPHIN) 1000 mg IVPB in 50 mL D5W minibag  1,000 mg Intravenous Q24H Julio Jacob MD        phenol 1.4 % mouth spray 1 spray  1 spray Mouth/Throat Q2H PRN Julio Jacob MD        ondansetron [facility]) injection 4 mg  4 mg Intravenous Q4H PRN Julio Jacob MD   4 mg at 02/26/21 1418    Or    promethazine (PHENERGAN) tablet 12.5 mg  12.5 mg Oral Q6H PRN Julio Jacob MD         Vital Signs (Current)   There were no vitals filed for this visit.                                        BP Readings from Last 3 Encounters: 21 134/79   21 (!) 151/78   20 111/74     Vital Signs Statistics (for past 48 hrs)     Temp  Av.3 °F (36.8 °C)  Min: 97.9 °F (36.6 °C)   Min taken time: 21 0815  Max: 98.9 °F (37.2 °C)   Max taken time: 21 0023  Pulse  Av.8  Min: [de-identified]   Min taken time: 21 112  Max: 89   Max taken time: 21 0201  Resp  Av.3  Min: 16   Min taken time: 21 112  Max: 29   Max taken time: 21 0031  BP  Min: 111/66   Min taken time: 21 043  Max: 155/73   Max taken time: 21 0146  MAP (mmHg)  Av.8  Min: 76   Min taken time: 21 043  Max: 102   Max taken time: 21 0634  SpO2  Av.6 %  Min: 90 %   Min taken time: 21 020  Max: 94 %   Max taken time: 21 1121  BP Readings from Last 3 Encounters:   21 134/79   21 (!) 151/78   20 111/74       BMI  There is no height or weight on file to calculate BMI. Estimated body mass index is 29.34 kg/m² as calculated from the following:    Height as of an earlier encounter on 21: 4' 11\" (1.499 m). Weight as of an earlier encounter on 21: 145 lb 4.5 oz (65.9 kg).     CBC   Lab Results   Component Value Date    WBC 8.5 2021    RBC 3.73 2021    HGB 10.4 2021    HCT 31.7 2021    MCV 86.2 2021    RDW 15.3 2021     2021     CMP    Lab Results   Component Value Date     2021    K 4.1 2021    CL 98 2021    CO2 24 2021    BUN 19 2021    CREATININE <0.5 2021    GFRAA >60 2021    AGRATIO 1.0 2021    LABGLOM >60 2021    GLUCOSE 180 2021    PROT 7.2 2021    CALCIUM 9.0 2021    BILITOT <0.2 2021    ALKPHOS 102 2021    AST 19 2021    ALT 17 2021     BMP    Lab Results   Component Value Date     2021    K 4.1 2021    CL 98 2021    CO2 24 2021    BUN 19 2021    CREATININE <0.5 2021 CALCIUM 9.0 02/26/2021    GFRAA >60 02/26/2021    LABGLOM >60 02/26/2021    GLUCOSE 180 02/26/2021     POCGlucose  Recent Labs     02/26/21  0039   GLUCOSE 180*      Coags    Lab Results   Component Value Date    PROTIME 18.0 02/26/2021    INR 1.54 02/26/2021    APTT 36.4 63/22/3012     HCG (If Applicable) No results found for: PREGTESTUR, PREGSERUM, HCG, HCGQUANT   ABGs No results found for: PHART, PO2ART, IIK9MHG, IMA5EHV, BEART, G0TEPUHZ   Type & Screen (If Applicable)  No results found for: LABABO, LABRH                         BMI: Wt Readings from Last 3 Encounters:       NPO Status:  >8h                          Anesthesia Evaluation  Patient summary reviewed no history of anesthetic complications:   Airway: Mallampati: II  TM distance: >3 FB   Neck ROM: full  Mouth opening: > = 3 FB Dental: normal exam         Pulmonary: breath sounds clear to auscultation      (-) COPD, asthma, sleep apnea and not a current smoker                           Cardiovascular:    (+) hypertension:,     (-) past MI and CABG/stent      Rhythm: regular  Rate: normal                    Neuro/Psych:   (+) neuromuscular disease: Parkinson's disease,    (-) seizures, TIA and CVA           GI/Hepatic/Renal:   (+) hiatal hernia,      (-) GERD       Endo/Other:        (-) diabetes mellitus, hypothyroidism               Abdominal:           Vascular:   + DVT, PE. Anesthesia Plan      MAC     ASA 3       Induction: intravenous. Anesthetic plan and risks discussed with patient. Plan discussed with CRNA.     Attending anesthesiologist reviewed and agrees with Pre Eval content              This pre-anesthesia assessment may be used as a history and physical.    DOS STAFF ADDENDUM: Pt seen and examined, chart reviewed (including anesthesia, drug and allergy history). No interval changes to history and physical examination. Anesthetic plan, risks, benefits, alternatives, and personnel involved discussed with patient. Patient verbalized an understanding and agrees to proceed.       Jazlyn Hercules MD  February 26, 2021  2:24 PM

## 2021-02-26 NOTE — CONSULTS
GASTROENTEROLOGY INPATIENT CONSULTATION        IDENTIFYING DATA/REASON FOR CONSULTATION   PATIENT:  Renuka Carr  MRN:  [id number]  ADMIT DATE: 2/26/2021  TIME OF EVALUATION: 2/26/2021 8:24 AM  HOSPITAL STAY:   LOS: 0 days     REASON FOR CONSULTATION:  Upper GI bleed    HISTORY OF PRESENT ILLNESS   Renuka Carr is a 82 y.o. female with a PMH of Parkinson's disease, recent PE s/p IVC filter (12/2020), HTN, abdominal surgery 2/2 bowel obstruction, hysterectomy and appendectomy who presented on 2/26/2021 with epigastric abdominal pain, dark brown emesis and constipation. CT scan performed in the ER noted small bowel obstruction with a transition point in the right lower quadrant as well as a suspected thrombus in the right femoral vein. She has been placed on heparin drip. General surgery has been consulted regarding bowel obstruction. She had NG tube placed which has been producing coffee-ground emesis. We have been consulted to evaluate for upper GI bleed. Blood work shows a hemoglobin of 11.8, normal BUN of 19, normal LFTs, normal lipase. Patient reports  pain, nausea, vomiting started yesterday. Her last bowel movement was 2 days ago. Patient denies NSAID use. Patient recently admitted in December with PE. She was considered a poor candidate for anticoagulation therapy therefore underwent IVC filter placement on 12/17/2020. She had a history of prior small bowel obstruction 7-8 years ago underwent surgery.           PAST MEDICAL, SURGICAL, FAMILY, and SOCIAL HISTORY     Past Medical History:   Diagnosis Date    Contraindication to anticoagulation therapy     fall risk    Hiatal hernia     High blood pressure     Neurogenic bladder     Parkinson disease ([facility]     pulmonary embolism 12/13/2020    right upper and lower lobes    Suprapubic catheter [facility])      Past Surgical History:   Procedure Laterality Date    ABDOMEN SURGERY      bowel obstruction    APPENDECTOMY  1948    CYSTOSCOPY N/A 12/03/2020    CYSTOSCOPY,  WITH INTRAVESICAL INJECTION OF BOTOX 200UNITS, WITH SUPRAPUBIC TUBE EXCHANGE performed by Milady Gong MD at [address] [facility]    IVC FILTER INSERTION Right 12/17/2020    Dr. Augustus Tidwell. [place] retreivable IVC filter    TONSILLECTOMY AND ADENOIDECTOMY  1943    VAGINA SURGERY  2006    repair vagina bladder and rectum     No family history on file.   Social History     Socioeconomic History    Marital status:      Spouse name: Not on file    Number of children: Not on file    Years of education: Not on file    Highest education level: Not on file   Occupational History    Not on file   Social Needs    Financial resource strain: Not on file    Food insecurity     Worry: Not on file     Inability: Not on file    Transportation needs     Medical: Not on file     Non-medical: Not on file   Tobacco Use    Smoking status: Never Smoker    Smokeless tobacco: Never Used   Substance and Sexual Activity    Alcohol use: Yes     Comment: occasional    Drug use: No    Sexual activity: Not on file   Lifestyle    Physical activity     Days per week: Not on file     Minutes per session: Not on file    Stress: Not on file   Relationships    Social connections     Talks on phone: Not on file     Gets together: Not on file     Attends Hinduism service: Not on file     Active member of club or organization: Not on file     Attends meetings of clubs or organizations: Not on file     Relationship status: Not on file    Intimate partner violence     Fear of current or ex partner: Not on file     Emotionally abused: Not on file     Physically abused: Not on file     Forced sexual activity: Not on file   Other Topics Concern    Not on file   Social History Narrative    Not on file       MEDICATIONS   SCHEDULED:      sodium chloride flush, 10 mL, 2 times per day      FLUIDS/DRIPS:     pantoprozole ([address]) infusion 8 mg/hr (02/26/21 0811)    heparin (PORCINE) Infusion Stopped (02/26/21 3707)     PRNs:     sodium chloride flush, 10 mL, PRN      promethazine, 12.5 mg, Q6H PRN    Or      ondansetron, 4 mg, Q6H PRN      acetaminophen, 650 mg, Q6H PRN    Or      acetaminophen, 650 mg, Q6H PRN      ALLERGIES:  She   Allergies   Allergen Reactions    Caduet [Amlodipine-Atorvastatin]     Estrogens Conjugated Other (See Comments)     lightheaded    Penicillins Rash    Terramycin [Oxytetracycline-Lidocaine] Rash       REVIEW OF SYSTEMS   Pertinent ROS noted in HPI    PHYSICAL EXAM     Vitals:    02/26/21 0516 02/26/21 0634 02/26/21 0645 02/26/21 0815   BP: 134/74 (!) 144/80  128/76   Pulse: 84 81  80   Resp: 23 22  18   Temp:  97.9 °F (36.6 °C)  97.9 °F (36.6 °C)   TempSrc:  Oral  Oral   SpO2: 94% 93%     Weight:   145 lb 4.5 oz (65.9 kg)    Height:   4' 11\" (1.499 m)        No intake/output data recorded. Physical Exam:  General appearance: alert, cooperative, no distress, +NGT  Eyes: Anicteric  Head: Normocephalic, without obvious abnormality  Lungs: clear to auscultation bilaterally, Normal Effort  Heart: regular rate and rhythm, normal S1 and S2, no murmurs or rubs  Abdomen:distended  Extremities: atraumatic, no cyanosis or edema  Skin: warm and dry, no jaundice  Neuro: Grossly intact, A&OX3      LABS AND IMAGING   Laboratory   Recent Labs     02/26/21 0039   WBC 9.8   HGB 11.8*   HCT 35.2*   MCV 86.0        Recent Labs     02/26/21 0039   *   K 4.1   CL 98*   CO2 24   BUN 19   CREATININE <0.5*     Recent Labs     02/26/21 0039   AST 19   ALT 17   BILITOT <0.2   ALKPHOS 102     Recent Labs     02/26/21 0039   LIPASE 10.0*     Recent Labs     02/26/21 0039   PROTIME 18.0*   INR 1.54*       Imaging  CT ABDOMEN PELVIS W IV CONTRAST Additional Contrast? None   Final Result   1. Small bowel obstruction with a transition point in the right lower   quadrant of the abdomen.    2. Gallstones without adjacent inflammatory changes. 3. Hiatal hernia. 4. Trace right pleural effusion. 5. Suspected thrombus in the right femoral vein. 6. Bilateral inguinal hernias. 7. Suprapubic catheter in the bladder lumen. XR CHEST PORTABLE   Final Result   Low lung volumes with bibasilar atelectasis, left side greater than right. There may be trace bilateral pleural effusions. ASSESSMENT AND RECOMMENDATIONS   82 y.o. female with a PMH of PMH of Parkinson's disease, recent PE s/p IVC filter (12/2020), HTN, abdominal surgery 2/2 bowel obstruction, hysterectomy and appendectomy who presented on 2/26/2021 with epigastric abdominal pain, dark brown emesis and constipation. CT scan showed small bowel obstruction with a transition point in the right lower quadrant as well as a suspected thrombus in the right femoral vein. IMPRESSION:  1. Coffee ground emesis  -hgb 11.8, BUN normal  -denies NSAID use  -no melena, last BM was 2 days ago    2. Small bowel obstruction  -NGT for decompression  -Gen Surgery following  -hx of bowel obstruction 7-8 years ago requiring surgery    3. PE/DVT   -s/p IVC filter 12/17/20  -was started on heparin gtt, currently on hold    RECOMMENDATIONS:    EGD today. Discuss plan with pt and pt's daughters, Jennifer Chester and FDPLU, who all agree to proceed with EGD        If you have any questions or need any further information, please feel free to contact our consult team.  Thank you for allowing us to participate in the care of Frank Berger. The note was completed using Dragon voice recognition transcription. Every effort was made to ensure accuracy; however, inadvertent transcription errors may be present despite my best efforts to edit errors.       Aquilino Arciniega PA-C

## 2021-02-26 NOTE — OP NOTE
replaced to IWS. Normal esophagus and duodenum. Recommendations:   1. NG to intermittent wall suction  2. Continue PPI drip  3. Once hemoglobin stabilizes, could consider re-starting anti-coagulation. 4.  Will follow. Saloni Henning MD  [address]    3/3/21. Biopsies did show ischemic gastritis. There was no necrosis. I discussed with Dr. Gabrielle Victoria and she is improving. REviewed CT and the CA and SMA look wide open. There is calcification at the takeoff of the celiac but apparent good flow. Suspect the ischemia was from gastric distension from the bowel obstruction. Discussed with Dr. Gabrielle Victoria and planning conservative care for now.     Lis Hanley MD

## 2021-02-26 NOTE — PROGRESS NOTES
Physician Progress Note      PATIENT:               Milbert Blizzard  CSN #:                  [id number]  :                       1935  ADMIT DATE:       2021 12:21 AM  DISCH DATE:  RESPONDING  PROVIDER #:        Sydney Santillan MD          QUERY TEXT:    Patient with suprapubic catheter admitted with SBO and possible UGI bleed. UA   with 4+ bacteria, large amount blood, positive nitrite, and large amount   leuko esterase. Urine culture pending. Treated with Rocephin in ED. If   possible, please document in the progress notes and discharge summary if you   are evaluating and/or treating any of the following: The medical record reflects the following:  Risk Factors: suprapubic catheter  Clinical Indicators: abdominal pain, urine turbid with 4+ bacteria, large   amount blood, positive nitrite, and large amount leuko esterase  Treatment: given Rocephin in ED, urine culture pending    Thank you,  Carlos Johnson RN, BSN, [email or web address]  Options provided:  -- UTI due to suprapubic catheter  -- UTI not associated with suprapubic catheter  -- Urinalysis results not clinically significant  -- Other - I will add my own diagnosis  -- Disagree - Not applicable / Not valid  -- Disagree - Clinically unable to determine / Unknown  -- Refer to Clinical Documentation Reviewer    PROVIDER RESPONSE TEXT:    This patient has a UTI due to suprapubic catheter.     Query created by: Mary Mustafa on 2021 1:56 PM      Electronically signed by:  Sydney Santillan MD 2021 2:01 PM

## 2021-02-26 NOTE — ED TRIAGE NOTES
Patient arrives to the ED with c/o abdominal pain with nausea and vomiting. States she was able to eat dinner that consisted of meat and noodles without difficulty. She then had approximately 6 episodes of brown emesis prior to arrival.  An employee at the nursing home told her it look like coffee grounds. She denies being able to tolerate any p.o. food or fluid since then. Patient AO x 3, RR e/u, skin p/w/d. Patient NAD.

## 2021-02-26 NOTE — ED PROVIDER NOTES
[address] [facility]        Pt Name: Anu Salazar  MRN: [id number]  [place] 1935  Date of evaluation: 2/26/2021  Provider: WILMAR Hanley - JOAN  PCP: Laurie Byrne     I have seen and evaluated this patient with my supervising physician Dr. Talha Montanez       Chief Complaint   Patient presents with    Abdominal Pain     pt came in after vomiting dark brown emesis started today along with abd pain. pt recently started on eliquis for a pe    Emesis       HISTORY OF PRESENT ILLNESS   (Location, Timing/Onset, Context/Setting, Quality, Duration, Modifying Factors, Severity, Associated Signs and Symptoms)  Note limiting factors. Anu Salazar is a 82 y.o. female medical history of hiatal hernia, hypertension, Parkinson's disease, suprapubic catheter, PE Joy Sinha the ED with complaints of periumbilical abdominal pain with nausea and vomiting. Patient was sent over from [facility] where she has lived for the past 6 years. States she was able to eat dinner that consisted of meat and noodles without difficulty. She then had approximately 6 episodes of brown emesis prior to arrival.  An employee at the nursing home told her it look like coffee grounds. She denies being able to tolerate any p.o. food or fluid since then. Patient takes Eliquis due to a diagnosis of PE in December 2020. She believes her last bowel movement was approximately 3 to 4 days ago and she feels abdominal distention and constipation. He does note anytime she coughs or moves she feels like she passes gas but is unable to pass any stool. She denies noting any epistaxis, hemoptysis, hematuria, or hematochezia. She denies any recent EGD or colonoscopy. She denies any associated fevers, cough, wheezing, chest pain, lightheaded, dizzy, or syncope. She does have a suprapubic catheter in place without difficulty.     Nursing Notes were all reviewed and agreed with or any disagreements were addressed in the HPI. REVIEW OF SYSTEMS    (2-9 systems for level 4, 10 or more for level 5)     Review of Systems    Positives and Pertinent negatives as per HPI. Except as noted above in the ROS, all other systems were reviewed and negative.        PAST MEDICAL HISTORY     Past Medical History:   Diagnosis Date    Hiatal hernia     High blood pressure     Parkinson disease ([address]     Suprapubic catheter ([address]          SURGICAL HISTORY     Past Surgical History:   Procedure Laterality Date    ABDOMEN SURGERY      bowel obstruction    APPENDECTOMY  1948    CYSTOSCOPY N/A 12/3/2020    CYSTOSCOPY,  WITH INTRAVESICAL INJECTION OF BOTOX 200UNITS, WITH SUPRAPUBIC TUBE EXCHANGE performed by Ankita Jauregui MD at [address] [facility] SURGERY  2006    repair vagina bladder and rectum         CURRENTMEDICATIONS       Previous Medications    ACETAMINOPHEN (TYLENOL) 325 MG TABLET    Take 650 mg by mouth every 6 hours as needed for Pain    ALENDRONATE (FOSAMAX) 70 MG TABLET    Take 70 mg by mouth every 7 days Indications: Sundays     ASPIRIN (ASPIRIN 81) 81 MG EC TABLET    Take 81 mg by mouth daily Stopped for surgery    BUSPIRONE (BUSPAR) 5 MG TABLET    Take 7.5 mg by mouth nightly    CARBIDOPA-LEVODOPA (SINEMET)  MG PER TABLET    Take 2 tablets by mouth 5 times daily     CARBIDOPA-LEVODOPA (SINEMET)  MG PER TABLET    Take 2 tablets by mouth 5 times daily     CARBOXYMETHYLCELLULOSE 1 % OPHTHALMIC SOLUTION    1 drop as needed for Dry Eyes    CRANBERRY 1000 MG CAPS    Take by mouth daily    FAMOTIDINE (PEPCID) 20 MG TABLET    Take 20 mg by mouth 2 times daily    LISINOPRIL (PRINIVIL;ZESTRIL) 5 MG TABLET    Take 5 mg by mouth daily    LOTEPREDNOL (LOTEMAX) 0.5 % OPHTHALMIC SUSPENSION    Place 1 drop into both eyes 2 times daily MAGNESIUM PO    Take 400 mg by mouth Daily with supper     MULTIPLE VITAMIN (MULTIVITAMIN) TABLET    Take 1 tablet by mouth daily    POLYETHYLENE GLYCOL (GLYCOLAX) 17 G PACKET    Take 17 g by mouth daily    PRAVASTATIN (PRAVACHOL) 20 MG TABLET    Take 20 mg by mouth daily    PROBIOTIC PRODUCT (PROBIOTIC-10 PO)    Take 1 tablet by mouth daily    SERTRALINE (ZOLOFT) 50 MG TABLET    Take 50 mg by mouth daily    VERAPAMIL (CALAN SR) 180 MG EXTENDED RELEASE TABLET    Take 360 mg by mouth every morning    VITAMIN C (ASCORBIC ACID) 500 MG TABLET    Take 1,000 mg by mouth Daily with lunch    VITAMIN D (CHOLECALCIFEROL) 25 MCG (1000 UT) TABS TABLET    Take 1,000 Units by mouth daily         ALLERGIES     Caduet [amlodipine-atorvastatin], Estrogens conjugated, Penicillins, and Terramycin [oxytetracycline-lidocaine]    FAMILYHISTORY     No family history on file. SOCIAL HISTORY       Social History     Tobacco Use    Smoking status: Never Smoker    Smokeless tobacco: Never Used   Substance Use Topics    Alcohol use: Yes     Comment: occasional    Drug use: No       SCREENINGS    Christa Coma Scale  Eye Opening: Spontaneous  Best Verbal Response: Oriented  Best Motor Response: Obeys commands  Christa Coma Scale Score: 15        PHYSICAL EXAM    (up to 7 for level 4, 8 or more for level 5)     ED Triage Vitals [02/26/21 0023]   Enc Vitals Group      /70      Pulse 84      Resp 17      Temp 98.9 °F (37.2 °C)      Temp Source Oral      SpO2 93 %      Weight 149 lb (67.6 kg)      Height 4' 11\" (1.499 m)         Physical Exam  Vitals signs and nursing note reviewed. Constitutional:       General: She is awake. Appearance: Normal appearance. She is well-developed and normal weight. HENT:      Head: Normocephalic and atraumatic. Nose: Nose normal.      Mouth/Throat:      Comments: Brown emesis surrounding oropharynx  Eyes:      General:         Right eye: No discharge. Left eye: No discharge. Neck:      Musculoskeletal: Normal range of motion. Cardiovascular:      Rate and Rhythm: Normal rate and regular rhythm. Heart sounds: Normal heart sounds. Pulmonary:      Effort: Pulmonary effort is normal. No respiratory distress. Breath sounds: Normal breath sounds. Abdominal:      General: Bowel sounds are decreased. Palpations: Abdomen is soft. Tenderness: There is abdominal tenderness in the periumbilical area. Hernia: A hernia is present. Hernia is present in the umbilical area. Genitourinary:     Rectum: Guaiac result negative. External hemorrhoid (not bleeding, not thrombosed) present. No tenderness. Comments: CAD, RN at bedside to chaperone rectal exam. Soft brown stool retrieved. No noted fecal impaction. Musculoskeletal: Normal range of motion. Skin:     General: Skin is warm and dry. Coloration: Skin is not pale. Neurological:      Mental Status: She is alert and oriented to person, place, and time. Psychiatric:         Behavior: Behavior normal. Behavior is cooperative.          DIAGNOSTIC RESULTS   LABS:    Labs Reviewed   CBC WITH AUTO DIFFERENTIAL - Abnormal; Notable for the following components:       Result Value    Hemoglobin 11.8 (*)     Hematocrit 35.2 (*)     RDW 15.7 (*)     Neutrophils Absolute 8.3 (*)     All other components within normal limits    Narrative:     Performed at:  [facility]  [address] [facility] [address]   Phone [phone]   COMPREHENSIVE METABOLIC PANEL W/ REFLEX TO MG FOR LOW K - Abnormal; Notable for the following components:    Sodium 133 (*)     Chloride 98 (*)     Glucose 180 (*)     CREATININE <0.5 (*)     Albumin/Globulin Ratio 1.0 (*)     All other components within normal limits    Narrative:     Performed at:  [facility]  [address] [place], [place] [address]   Phone [phone]   LIPASE - Abnormal; Notable for the following components:    Lipase 10.0 (*)     All other components within normal limits    Narrative:     Performed at:  [facility]  [address] [place], [place] [address]   Phone [phone]   PROTIME-INR - Abnormal; Notable for the following components:    Protime 18.0 (*)     INR 1.54 (*)     All other components within normal limits    Narrative:     Performed at:  [address] [address]   Phone [phone]   URINE RT REFLEX TO CULTURE - Abnormal; Notable for the following components:    Clarity, UA TURBID (*)     Ketones, Urine TRACE (*)     Blood, Urine LARGE (*)     Protein,  (*)     Nitrite, Urine POSITIVE (*)     Leukocyte Esterase, Urine LARGE (*)     All other components within normal limits    Narrative:     Performed at:  [address] [address]   Phone [phone]   LACTIC ACID, PLASMA    Narrative:     Performed at:  [address] [address]   Phone [phone]   BLOOD OCCULT STOOL DIAGNOSTIC   MICROSCOPIC URINALYSIS       All other labs were within normal range or not returned as of this dictation. EKG: All EKG's are interpreted by the Emergency Department Physician in the absence of a cardiologist.  Please see their note for interpretation of EKG. RADIOLOGY:   Non-plain film images such as CT, Ultrasound and MRI are read by the radiologist. Plain radiographic images are visualized and preliminarily interpreted by the ED Provider with the below findings:        Interpretation per the Radiologist below, if available at the time of this note:    CT ABDOMEN PELVIS W IV CONTRAST Additional Contrast? None   Preliminary Result   1. Small bowel obstruction with a transition point in the right lower   quadrant of the abdomen.    2. Gallstones without adjacent inflammatory changes. 3. Hiatal hernia. 4. Trace right pleural effusion. 5. Suspected thrombus in the right femoral vein. 6. Bilateral inguinal hernias. 7. Suprapubic catheter in the bladder lumen. XR CHEST PORTABLE   Preliminary Result   1. Low lung volumes with bibasilar atelectasis, left side greater than right. There may be trace bilateral pleural effusions. No results found. PROCEDURES   Unless otherwise noted below, none     Procedures    CRITICAL CARE TIME   N/A    CONSULTS:  IP CONSULT TO HOSPITALIST  IP CONSULT TO GENERAL SURGERY      EMERGENCY DEPARTMENT COURSE and DIFFERENTIAL DIAGNOSIS/MDM:   Vitals:    Vitals:    02/26/21 0023   BP: 134/70   Pulse: 84   Resp: 17   Temp: 98.9 °F (37.2 °C)   TempSrc: Oral   SpO2: 93%   Weight: 149 lb (67.6 kg)   Height: 4' 11\" (1.499 m)       Patient was given the following medications:  Medications   pantoprazole (PROTONIX) 80 mg in sodium chloride 0.9 % 100 mL infusion (8 mg/hr Intravenous New Bag 2/26/21 0100)   0.9 % sodium chloride bolus (1,000 mLs Intravenous New Bag 2/26/21 0047)   pantoprazole (PROTONIX) 80 mg in sodium chloride 0.9 % 100 mL bolus (80 mg Intravenous New Bag 2/26/21 0057)   ondansetron (ZOFRAN) injection 4 mg (4 mg Intravenous Given 2/26/21 0043)   iopamidol (ISOVUE-370) 76 % injection 75 mL (75 mLs Intravenous Given 2/26/21 0159)           Pertinent Labs & Imaging studies reviewed. (See chart for details)   -  Patient seen and evaluated in the emergency department. -  Triage and nursing notes reviewed and incorporated. -  Old chart records reviewed and incorporated. -  Patient case discussed with attending physician,  Dr. Avis Chavarria. They saw and examined patient.   -  Differential diagnosis includes:  Diverticulitis, enteritis, Crohn's disease, GI bleed, SBO, peptic ulcer disease, H. pylori, dehydration, gastritis, toxic megacolon, mesenteric adenitis, ulcerative colitis, colon cancer, versus COVID-19  - Work-up included:  See above CXR, CT abdomen pelvis, EKG, CBC, CMP, lipase, pro time, INR, lactic acid, UA, Hemoccult stool  -  ED treatment included:   Normal saline, Protonix, Zofran, NG tube  - Consults: General surgery, Dr. Lauren Fields. Hospitalist, Dr. Chevy Wang  -  Results discussed with patient. Labs show  pro time 18, INR 1.54. CBC with hemoglobin 11.8, hematocrit 35.2, RDW 15.7, absolute neutrophils 8.3. CXR shows low lung volumes with bibasilar atelectasis, left side greater than right. There may be trace bilateral pleural effusions. CT abd/pelvis with IV contrast shows a small bowel obstruction with a transition point in the right lower quadrant of the abdomen. Gallstones without adjacent inflammatory changes. Hiatal hernia. Trace right pleural effusion. Suspected thrombus in the right femoral vein. Bilateral inguinal hernias. Suprapubic catheter in the bladder lumen. UA shows turbid clarity, trace ketones, large blood, 100 protein, nitrite positive with large leukocytes. The patient is agreeable with plan of care and disposition.  -  Disposition:  Admission    CRITICAL CARE TIME   Total Critical Care time was 45 minutes, excluding separately reportable procedures. There was a high probability of clinically significant/life threatening deterioration in the patient's condition which required my urgent intervention. FINAL IMPRESSION      1. Small bowel obstruction ([address]    2. Acute upper GI bleed    3. Non-intractable vomiting with nausea, unspecified vomiting type    4. Periumbilical abdominal pain    5. Hiatal hernia    6. Gallstones    7. Bilateral inguinal hernia without obstruction or gangrene, recurrence not specified    8.  Femoral vein thrombosis, right ([address]          DISPOSITION/PLAN   DISPOSITION    Admission       (Please note that portions of this note were completed with a voice recognition program.  Efforts were made to edit the dictations but occasionally words are mis-transcribed.)    WILMAR Jain CNP (electronically signed)            WILMAR Jain CNP  03/03/21 9140

## 2021-02-26 NOTE — ED PROVIDER NOTES
[address] [facility]      Pt Name: Collette Salcido  MRN: [id number]  [place] 1935  Date of evaluation: 2/26/2021  Provider: Summer Alfredo MD    [address] [place] [address]       Chief Complaint   Patient presents with    Abdominal Pain     pt came in after vomiting dark brown emesis started today along with abd pain. pt recently started on eliquis for a pe    Emesis       HISTORY OF PRESENT ILLNESS    Collette Salcido is a 82 y.o. female who presents to the emergency department with abdominal pain. Patient endorses epigastric abdominal pain. Associated with vomiting dark brown emesis today. Positive for constipation and difficulty having bowel movement. Really no flatus. Positive for 10 out of 10 sharp pain. Has not taken anything for pain. Symptoms started spontaneously. No other associated symptoms. She does have a history of bowel obstructions. Nursing Notes were reviewed. Including nursing noted for FM, Surgical History, Past Medical History, Social History, vitals, and allergies; agree with all. REVIEW OF SYSTEMS       Review of Systems   Constitutional: Negative for diaphoresis and unexpected weight change. HENT: Negative for congestion and dental problem. Eyes: Negative for discharge and itching. Respiratory: Negative for cough and shortness of breath. Cardiovascular: Negative for chest pain and leg swelling. Gastrointestinal: Positive for abdominal pain, constipation, nausea and vomiting. Endocrine: Negative for cold intolerance and heat intolerance. Genitourinary: Negative for vaginal bleeding, vaginal discharge and vaginal pain. Musculoskeletal: Negative for neck pain and neck stiffness. Skin: Negative for color change and pallor. Neurological: Negative for tremors and weakness. Psychiatric/Behavioral: Negative for agitation and behavioral problems.        Except as noted above the remainder of the review of systems was reviewed and negative.      PAST MEDICAL HISTORY     Past Medical History:   Diagnosis Date    Hiatal hernia     High blood pressure     Parkinson disease ([facility]     Suprapubic catheter ([facility]        SURGICAL HISTORY       Past Surgical History:   Procedure Laterality Date    ABDOMEN SURGERY      bowel obstruction    APPENDECTOMY  1948    CYSTOSCOPY N/A 12/3/2020    CYSTOSCOPY,  WITH INTRAVESICAL INJECTION OF BOTOX 200UNITS, WITH SUPRAPUBIC TUBE EXCHANGE performed by Bertram Desai MD at [address] SURGERY  2006    repair vagina bladder and rectum       CURRENT MEDICATIONS       Previous Medications    ACETAMINOPHEN (TYLENOL) 325 MG TABLET    Take 650 mg by mouth every 6 hours as needed for Pain    ALENDRONATE (FOSAMAX) 70 MG TABLET    Take 70 mg by mouth every 7 days Indications: Sundays     ASPIRIN (ASPIRIN 81) 81 MG EC TABLET    Take 81 mg by mouth daily Stopped for surgery    BUSPIRONE (BUSPAR) 5 MG TABLET    Take 7.5 mg by mouth nightly    CARBIDOPA-LEVODOPA (SINEMET)  MG PER TABLET    Take 2 tablets by mouth 5 times daily     CARBIDOPA-LEVODOPA (SINEMET)  MG PER TABLET    Take 2 tablets by mouth 5 times daily     CARBOXYMETHYLCELLULOSE 1 % OPHTHALMIC SOLUTION    1 drop as needed for Dry Eyes    CRANBERRY 1000 MG CAPS    Take by mouth daily    FAMOTIDINE (PEPCID) 20 MG TABLET    Take 20 mg by mouth 2 times daily    LISINOPRIL (PRINIVIL;ZESTRIL) 5 MG TABLET    Take 5 mg by mouth daily    LOTEPREDNOL (LOTEMAX) 0.5 % OPHTHALMIC SUSPENSION    Place 1 drop into both eyes 2 times daily    MAGNESIUM PO    Take 400 mg by mouth Daily with supper     MULTIPLE VITAMIN (MULTIVITAMIN) TABLET    Take 1 tablet by mouth daily    POLYETHYLENE GLYCOL (GLYCOLAX) 17 G PACKET    Take 17 g by mouth daily    PRAVASTATIN (PRAVACHOL) 20 MG TABLET    Take 20 mg by mouth daily PROBIOTIC PRODUCT (PROBIOTIC-10 PO)    Take 1 tablet by mouth daily    SERTRALINE (ZOLOFT) 50 MG TABLET    Take 50 mg by mouth daily    VERAPAMIL (CALAN SR) 180 MG EXTENDED RELEASE TABLET    Take 360 mg by mouth every morning    VITAMIN C (ASCORBIC ACID) 500 MG TABLET    Take 1,000 mg by mouth Daily with lunch    VITAMIN D (CHOLECALCIFEROL) 25 MCG (1000 UT) TABS TABLET    Take 1,000 Units by mouth daily       ALLERGIES     Caduet [amlodipine-atorvastatin], Estrogens conjugated, Penicillins, and Terramycin [oxytetracycline-lidocaine]    FAMILY HISTORY      No family history on file.     SOCIAL HISTORY       Social History     Socioeconomic History    Marital status:      Spouse name: Not on file    Number of children: Not on file    Years of education: Not on file    Highest education level: Not on file   Occupational History    Not on file   Social Needs    Financial resource strain: Not on file    Food insecurity     Worry: Not on file     Inability: Not on file    Transportation needs     Medical: Not on file     Non-medical: Not on file   Tobacco Use    Smoking status: Never Smoker    Smokeless tobacco: Never Used   Substance and Sexual Activity    Alcohol use: Yes     Comment: occasional    Drug use: No    Sexual activity: Not on file   Lifestyle    Physical activity     Days per week: Not on file     Minutes per session: Not on file    Stress: Not on file   Relationships    Social connections     Talks on phone: Not on file     Gets together: Not on file     Attends Shinto service: Not on file     Active member of club or organization: Not on file     Attends meetings of clubs or organizations: Not on file     Relationship status: Not on file    Intimate partner violence     Fear of current or ex partner: Not on file     Emotionally abused: Not on file     Physically abused: Not on file     Forced sexual activity: Not on file   Other Topics Concern    Not on file   Social History Narrative    Not on file       PHYSICAL EXAM       ED Triage Vitals [02/26/21 0023]   BP Temp Temp Source Pulse Resp SpO2 Height Weight   134/70 98.9 °F (37.2 °C) Oral 84 17 93 % 4' 11\" (1.499 m) 149 lb (67.6 kg)       Physical Exam  Vitals signs and nursing note reviewed. Constitutional:       General: She is not in acute distress. Appearance: She is well-developed. She is ill-appearing. She is not toxic-appearing or diaphoretic. HENT:      Head: Normocephalic and atraumatic. Right Ear: External ear normal.      Left Ear: External ear normal.   Eyes:      General:         Right eye: No discharge. Left eye: No discharge. Conjunctiva/sclera: Conjunctivae normal.      Pupils: Pupils are equal, round, and reactive to light. Neck:      Musculoskeletal: Normal range of motion and neck supple. Cardiovascular:      Rate and Rhythm: Normal rate and regular rhythm. Heart sounds: No murmur. Pulmonary:      Effort: Pulmonary effort is normal. No respiratory distress. Breath sounds: Normal breath sounds. No wheezing or rales. Abdominal:      General: There is distension. Palpations: Abdomen is soft. There is no mass. Tenderness: There is abdominal tenderness. There is no guarding or rebound. Genitourinary:     Comments: Deferred  Musculoskeletal: Normal range of motion. General: No deformity. Skin:     General: Skin is warm. Findings: No erythema or rash. Neurological:      Mental Status: She is alert and oriented to person, place, and time. She is not disoriented. Cranial Nerves: No cranial nerve deficit. Motor: No atrophy or abnormal muscle tone. Coordination: Coordination normal.   Psychiatric:         Behavior: Behavior normal.         Thought Content:  Thought content normal.         DIAGNOSTIC RESULTS     RADIOLOGY:   Non-plain film images such as CT, Ultrasoundand MRI are read by the radiologist. Plain radiographic images are visualized and preliminarily interpreted by the emergency physician with the below findings:    Impression   1. Small bowel obstruction with a transition point in the right lower   quadrant of the abdomen. 2. Gallstones without adjacent inflammatory changes. 3. Hiatal hernia. 4. Trace right pleural effusion. 5. Suspected thrombus in the right femoral vein. 6. Bilateral inguinal hernias. 7. Suprapubic catheter in the bladder lumen.      ED BEDSIDE ULTRASOUND:   Performed by ED Physician - none    LABS:  Labs Reviewed   CBC WITH AUTO DIFFERENTIAL - Abnormal; Notable for the following components:       Result Value    Hemoglobin 11.8 (*)     Hematocrit 35.2 (*)     RDW 15.7 (*)     Neutrophils Absolute 8.3 (*)     All other components within normal limits    Narrative:     Performed at:  [address] [place]   Phone [phone]   COMPREHENSIVE METABOLIC PANEL W/ REFLEX TO MG FOR LOW K - Abnormal; Notable for the following components:    Sodium 133 (*)     Chloride 98 (*)     Glucose 180 (*)     CREATININE <0.5 (*)     Albumin/Globulin Ratio 1.0 (*)     All other components within normal limits    Narrative:     Performed at:  [address] [place]   Phone [phone]   LIPASE - Abnormal; Notable for the following components:    Lipase 10.0 (*)     All other components within normal limits    Narrative:     Performed at:  [address] [place]   Phone [phone]   PROTIME-INR - Abnormal; Notable for the following components:    Protime 18.0 (*)     INR 1.54 (*)     All other components within normal limits    Narrative:     Performed at:  [address] [place]   Phone [phone]   URINE RT REFLEX TO CULTURE - Abnormal; Notable for the following components:    Clarity, UA TURBID (*)     Ketones, Urine TRACE (*)     Blood, Urine LARGE (*)     Protein,  (*)     Nitrite, Urine POSITIVE (*)     Leukocyte Esterase, Urine LARGE (*)     All other components within normal limits    Narrative:     Performed at:  [facility]  [address], [facility] [address]   Phone [phone]   MICROSCOPIC URINALYSIS - Abnormal; Notable for the following components:    RBC, UA >100 (*)     Bacteria, UA 4+ (*)     Crystals, UA 1+ Ca. Oxalate (*)     WBC, UA >900 (*)     Epithelial Cells, UA 7 (*)     All other components within normal limits    Narrative:     Performed at:  [facility]  [address], [facility] [address]   Phone [phone]   CULTURE, URINE   LACTIC ACID, PLASMA    Narrative:     Performed at:  [address] [place] [address]   Phone [phone]   BLOOD OCCULT STOOL DIAGNOSTIC    Narrative:     ORDER#: [id number]                          ORDERED BY: Sahra Nugent  SOURCE: Stool                              COLLECTED:  02/26/21 02:38  ANTIBIOTICS AT [facility].:                      RECEIVED :  02/26/21 02:38  Performed at:  [facility]  [address], [facility] [address]   Phone [phone]       All other labs were withinnormal range or not returned as of this dictation. EMERGENCY DEPARTMENT COURSE and DIFFERENTIAL DIAGNOSIS/MDM:     PMH, Surgical Hx, FH, Social Hx reviewed by myself (ETOH usage, Tobacco usage, Drug usage reviewed by myself, no pertinent Hx)- No Pertinent Hx     Old records were reviewed by me     19-year-old female with small bowel obstruction. Hold on NG tube. General surgery consulted. Possible upper GI bleed for which Protonix started. Femoral vein thrombosis for which patient is on Eliquis. UTI for which antibiotics given.   Admission

## 2021-02-26 NOTE — PROGRESS NOTES
Pt arrived to room 5281 from ED via stretcher. Vital signs and assessment complete. Bed alarm on and plugged in. Oriented to room and call light. Fall precautions explained. Heart monitor applied and verified with [facility], All questions answered, all needs met at this time.      Electronically signed by Stevan Ken RN on 2/26/2021 at 6:47 AM

## 2021-02-26 NOTE — PROGRESS NOTES
Full note to follow    82year old with PE, parkinson's, neurogenic bladder with suprapubic catheter, HTN, hiatal hernia, fall risk so not on anti-coagulation, tonsillectomy, appendectomy, hysterectomy, abdominal surgery for bowel obstruction. Presents with abdominal pain in the epigastric area, obstipation. Says she was having nausea and vomiting prior to arrival.  Did vomit dark material but says she ate chocolate for dinner last night and wondered    Labs showed Na 133, K 4.1, BUN 19 and Cr <0.5. LFT's normal.  Lipase 10. CBC 9.8, 11.8/35.2, 281. INR 1.54. Occult blood in stool negative. UA with >900 WBC's.  CT A&P with contrast 2/26/21 showed a small bowel obstruction with a transition point in the RLQ, gallstones without inflammatory changes, hiatal hernia, right femoral vein thrombus, bilateral inguinal hernias. On exam, Alert and oriented x 3, No scleral icterus, NG in place with brownish material, abd soft, distended, mild tenderness, no rebound or guarding, no edema  Imp:  1. Small bowel obstruction  2. Blood in NG:No signs of GI bleeding prior to NG placement. NG placed and put on heparin so this could be just gastric irritation from the NG tube. 3.  Femoral DVT  4. Urinary tract infection    Plan:   Discussed with Dr. Rick Cintron and he would like us to do an EGD so will plan on EGD today to evaluate etiology for bleeding. Thank you for allowing me to participate in this patient's care. If there are any questions or concerns regarding this patient, or the plan we have set in place, please feel free to contact me at [phone].        Alfonso Pelaez MD

## 2021-02-26 NOTE — PROGRESS NOTES
Medication Reconciliation    List of medications patient is currently taking is complete. Source of information: 1. List from [address] [amlodipine-atorvastatin], Estrogens conjugated, Penicillins, and Terramycin [oxytetracycline-lidocaine]     Notes regarding home medications:   1. Patient is currently on Eliquis 5 mg BID for DVT. Was initially on Eliquis for PE x 10 days 12/22/20-1/1/21. Patient then developed a DVT and was started back on Eliquis 1/12/21. 2. Sinemet CR  mg QHS added to list.   3. New order for rivastigmine 1.5 mg BID on 2/25/21 which has not yet been started.    4. Buspirone removed from list.       Michael Mitchell, PharmD, 2/26/2021 11:01 AM

## 2021-02-26 NOTE — H&P
Hospital Medicine History & Physical      PCP: Laurie Byrne    Date of Admission: 2/26/2021    Date of Service: Pt seen/examined on 2/21/ and Admitted to Inpatient with expected LOS greater than two midnights due to medical therapy. Chief Complaint: Abdominal pain, coffee-ground emesis      History Of Present Illness:      82 y.o. female with history of hypertension, Parkinson's disease, neurogenic bladder status post suprapubic catheter, essential hypertension, recently admitted for right upper and lower lobe pulmonary embolism in 12/31--treated with heparin drip and transition to Eliquis--initially there was a concern for chronic anticoagulation given risk of falls and had an IVC filter but was discharged on Eliquis. Delsa Severance Ultrasound of the legs was negative for DVT at that time. .. Today she presented with abdominal pain, nausea and vomiting with coffee-ground emesis. . No bowel movements. .  On presentation, BP was 1 3470, pulse 84, respirations 20, oxygen saturation of 93% room air. .. CT of the abdomen showed small bowel obstruction with a transition point in the right lower quadrant of the abdomen, suspected thrombus in the right femoral vein, bilateral inguinal hernias, hiatal hernia. Delsa Severance  H&H was 11.8/35.2, very close to her baseline    Past Medical History:          Diagnosis Date    Contraindication to anticoagulation therapy     fall risk    Hiatal hernia     High blood pressure     Neurogenic bladder     Parkinson disease ([address]     pulmonary embolism 12/13/2020    right upper and lower lobes    Suprapubic catheter [facility])        Past Surgical History:          Procedure Laterality Date    ABDOMEN SURGERY      bowel obstruction    APPENDECTOMY  1948    CYSTOSCOPY N/A 12/03/2020    CYSTOSCOPY,  WITH INTRAVESICAL INJECTION OF BOTOX 200UNITS, WITH SUPRAPUBIC TUBE EXCHANGE performed by Marga Piedra MD at [address] [facility] 12/17/2020    Dr. Autumn Liu. Bessy retreivable IVC filter    TONSILLECTOMY AND ADENOIDECTOMY  1943    VAGINA SURGERY  2006    repair vagina bladder and rectum       Medications Prior to Admission:      Prior to Admission medications    Medication Sig Start Date End Date Taking?  Authorizing Provider   carbidopa-levodopa (SINEMET)  MG per tablet Take 2 tablets by mouth 5 times daily     Historical Provider, MD   verapamil (CALAN SR) 180 MG extended release tablet Take 360 mg by mouth every morning    Historical Provider, MD   loteprednol (LOTEMAX) 0.5 % ophthalmic suspension Place 1 drop into both eyes 2 times daily    Historical Provider, MD   lisinopril (PRINIVIL;ZESTRIL) 5 MG tablet Take 5 mg by mouth daily    Historical Provider, MD   pravastatin (PRAVACHOL) 20 MG tablet Take 20 mg by mouth daily 2/11/19   Historical Provider, MD   alendronate (FOSAMAX) 70 MG tablet Take 70 mg by mouth every 7 days Indications: Sundays  3/12/19   Historical Provider, MD   famotidine (PEPCID) 20 MG tablet Take 20 mg by mouth 2 times daily    Historical Provider, MD   busPIRone (BUSPAR) 5 MG tablet Take 7.5 mg by mouth nightly    Historical Provider, MD   sertraline (ZOLOFT) 50 MG tablet Take 50 mg by mouth daily 3/11/19   Historical Provider, MD   Cranberry 1000 MG CAPS Take by mouth daily    Historical Provider, MD   Multiple Vitamin (MULTIVITAMIN) tablet Take 1 tablet by mouth daily    Historical Provider, MD   Probiotic Product (PROBIOTIC-10 PO) Take 1 tablet by mouth daily    Historical Provider, MD   vitamin D (CHOLECALCIFEROL) 25 MCG (1000 UT) TABS tablet Take 1,000 Units by mouth daily    Historical Provider, MD   vitamin C (ASCORBIC ACID) 500 MG tablet Take 1,000 mg by mouth Daily with lunch    Historical Provider, MD   MAGNESIUM PO Take 400 mg by mouth Daily with supper     Historical Provider, MD   aspirin (ASPIRIN 81) 81 MG EC tablet Take 81 mg by mouth daily Stopped for surgery    Historical Provider, MD   polyethylene glycol (GLYCOLAX) 17 g packet Take 17 g by mouth daily    Historical Provider, MD   acetaminophen (TYLENOL) 325 MG tablet Take 650 mg by mouth every 6 hours as needed for Pain    Historical Provider, MD   carboxymethylcellulose 1 % ophthalmic solution 1 drop as needed for Dry Eyes    Historical Provider, MD       Allergies:  Caduet [amlodipine-atorvastatin], Estrogens conjugated, Penicillins, and Terramycin [oxytetracycline-lidocaine]    Social History:      The patient currently lives    TOBACCO:   reports that she has never smoked. She has never used smokeless tobacco.  ETOH:   reports current alcohol use. Family History:       Reviewed in detail and negative for DM, CAD, Cancer, CVA. Positive as follows:    No family history on file. REVIEW OF SYSTEMS:   Pertinent positives as noted in the HPI. All other systems reviewed and negative. PHYSICAL EXAM:    /76   Pulse 80   Temp 97.9 °F (36.6 °C) (Oral)   Resp 18   Ht 4' 11\" (1.499 m)   Wt 145 lb 4.5 oz (65.9 kg)   SpO2 93%   BMI 29.34 kg/m²     General appearance:  No apparent distress, appears stated age and cooperative. HEENT:  Normal cephalic, atraumatic without obvious deformity. Pupils equal, round, and reactive to light. Extra ocular muscles intact. Conjunctivae/corneas clear. Neck: Supple, with full range of motion. No jugular venous distention. Trachea midline. Respiratory:  Normal respiratory effort. Clear to auscultation, bilaterally without Rales/Wheezes/Rhonchi. Cardiovascular:  Regular rate and rhythm with normal S1/S2 without murmurs, rubs or gallops. Abdomen: Soft, -tender, no guarding or rebound tenderness distended with normal bowel sounds. Musculoskeletal:  No clubbing, cyanosis or edema bilaterally. Full range of motion without deformity. Skin: Skin color, texture, turgor normal.  No rashes or lesions.   Neurologic:  Neurovascularly intact without any focal sensory/motor deficits. Cranial nerves: II-XII intact, grossly non-focal.  Psychiatric:  Alert and oriented, thought content appropriate, normal insight  Capillary Refill: Brisk,< 3 seconds   Peripheral Pulses: +2 palpable, equal bilaterally       CXR:  I have reviewed the CXR with the following interpretation:   EKG:  I have reviewed the EKG with the following interpretation:     Labs:     Recent Labs     02/26/21 0039   WBC 9.8   HGB 11.8*   HCT 35.2*        Recent Labs     02/26/21 0039   *   K 4.1   CL 98*   CO2 24   BUN 19   CREATININE <0.5*   CALCIUM 9.0     Recent Labs     02/26/21 0039   AST 19   ALT 17   BILITOT <0.2   ALKPHOS 102     Recent Labs     02/26/21 0039   INR 1.54*     No results for input(s): Jessi Ruelas in the last 72 hours. Urinalysis:      Lab Results   Component Value Date    NITRU POSITIVE 02/26/2021    WBCUA >900 02/26/2021    BACTERIA 4+ 02/26/2021    RBCUA >100 02/26/2021    BLOODU LARGE 02/26/2021    SPECGRAV 1.027 02/26/2021    GLUCOSEU Negative 02/26/2021         ASSESSMENT:    -Acute small bowel obstruction--  -Upper GI bleed with coffee-ground emesis  in the setting of anticoagulation and aspirin use  -Mild acute blood loss anemia  -Recent PE 12/20-had IVC filter at that time given concerns for anticoagulation with a high fall risk--she was however started on Eliquis at that time. ...  -Suspected right femoral thrombus on CT 2/26. . But Doppler negative ultrasound 12/20 with PE diagnosis-could be a complication of IVC filter placement  -Essential hypertension-stable-on lisinopril and verapamil  -Hyperlipidemia on statin  -Parkinson's disease-on Sinemet  -Neurogenic bladder/chronic suprapubic catheter  -Presumed UTI associated with suprapubic catheter-has abnormal UA  -Hyponatremia-mild-hypovolemic  -Hiatal hernia with GERD--on famotidine    PLAN:  GI plans on EGD today  Appreciate general surgery consult  Continue IV fluids  Continue PPI  Monitor H&H and transfuse as needed  Hold anticoagulants,asa  Follow-up on urine culture and continue IV Rocephin empirically for suspected UTI    DVT Prophylaxis: SCDs  Diet: Diet NPO Effective Now  Code Status: DNR-CCA         Darleen Jimenez MD    Thank you Lucero Cr for the opportunity to be involved in this patient's care. If you have any questions or concerns please feel free to contact me at [phone].

## 2021-02-26 NOTE — ANESTHESIA POSTPROCEDURE EVALUATION
Department of Anesthesiology  Postprocedure Note    Patient: Elizabeth Stewart  MRN: [id number]  YOB: 1935  Date of evaluation: 2/26/2021  Time:  5:11 PM     Procedure Summary     Date: 02/26/21 Room / Location: [address] [place] [address]    Anesthesia Start: 1600 Anesthesia Stop: 1806    Procedure: EGD BIOPSY (N/A ) Diagnosis: (Coffe ground emesis)    Surgeons: Perla Kuhn MD Responsible Provider: Chance Pa MD    Anesthesia Type: MAC ASA Status: 3          Anesthesia Type: MAC    Ainsley Phase I: Ainsley Score: 7    Ainsley Phase II:      Last vitals: Reviewed and per EMR flowsheets.        Anesthesia Post Evaluation    Patient location during evaluation: bedside  Patient participation: complete - patient participated  Level of consciousness: awake  Pain score: 0  Airway patency: patent  Nausea & Vomiting: no nausea and no vomiting  Complications: no  Cardiovascular status: blood pressure returned to baseline  Respiratory status: acceptable  Hydration status: euvolemic

## 2021-02-26 NOTE — PLAN OF CARE
Problem: DAILY CARE  Goal: Daily care needs are met  Outcome: Ongoing     Problem: PAIN  Goal: Patient's pain/discomfort is manageable  Outcome: Ongoing     Problem: SKIN INTEGRITY  Goal: Skin integrity is maintained or improved  Outcome: Ongoing     Problem: KNOWLEDGE DEFICIT  Goal: Patient/S.O. demonstrates understanding of disease process, treatment plan, medications, and discharge instructions.   Outcome: Ongoing     Problem: DISCHARGE BARRIERS  Goal: Patient's continuum of care needs are met  Outcome: Ongoing

## 2021-02-26 NOTE — CONSULTS
Clinical Pharmacy Note  Heparin Dosing Consult    Jeff Arreaga is a 82 y.o. female ordered heparin per high dose nomogram by Dr. Rand Reinoso. Lab Results   Component Value Date    APTT 36.4 02/26/2021     Lab Results   Component Value Date    HGB 11.8 02/26/2021    HCT 35.2 02/26/2021     02/26/2021    INR 1.54 02/26/2021       Ht Readings from Last 1 Encounters:   02/26/21 4' 11\" (1.499 m)        Wt Readings from Last 1 Encounters:   02/26/21 149 lb (67.6 kg)     Dosing weight: 53 kg    Assessment/Plan:  Initial bolus: 4200 units  Initial infusion rate: 9.5 mL/hr  Next aPTT: 1200 2/26/21    Pharmacy will continue to monitor adjust heparin based on aPTT results using nomogram below:     HIGH DOSE HEPARIN PROTOCOL (DVT/PE)     Initial Bolus: 80 units/kg Max Bolus: 10,000 units       Initial Rate: 18 units/kg/hr Max Initial Rate: 2,100 units/hr     aPTT < 36   Heparin 80 units/kg bolus Increase infusion by 4 units/kg/hr       (maximum 10,000 units)   aPTT 37-48   Heparin 40 units/kg bolus Increase infusion by 2 units/kg/hr       (maximum 5,000 units)   aPTT 49-76   No bolus   No change   aPTT 77-85   No bolus   Decrease infusion by 2 units/kg/hr   aPTT 86-94   Hold heparin for 1 hour Decrease infusion by 3 units/kg/hr   aPTT      Hold heparin for 1 hour Decrease infusion by 4 units/kg/hr   aPTT > 103   Hold heparin for 1 hour Decrease infusion by 6 units/kg/hr    Obtain aPTT 6 hours after initial bolus and 6 hours after any dose change until two consecutive therapeutic aPTTs are achieved - then daily.     Tere Austin, PharmD

## 2021-02-27 ENCOUNTER — APPOINTMENT (OUTPATIENT)
Dept: GENERAL RADIOLOGY | Age: 86
DRG: 388 | End: 2021-02-27
Payer: COMMERCIAL

## 2021-02-27 LAB
ANION GAP SERPL CALCULATED.3IONS-SCNC: 8 MMOL/L (ref 3–16)
BASOPHILS ABSOLUTE: 0 K/UL (ref 0–0.2)
BASOPHILS RELATIVE PERCENT: 0.4 %
BUN BLDV-MCNC: 19 MG/DL (ref 7–20)
CALCIUM SERPL-MCNC: 7.6 MG/DL (ref 8.3–10.6)
CHLORIDE BLD-SCNC: 109 MMOL/L (ref 99–110)
CO2: 26 MMOL/L (ref 21–32)
CREAT SERPL-MCNC: 0.5 MG/DL (ref 0.6–1.2)
EOSINOPHILS ABSOLUTE: 0.1 K/UL (ref 0–0.6)
EOSINOPHILS RELATIVE PERCENT: 1.2 %
GFR AFRICAN AMERICAN: >60
GFR NON-AFRICAN AMERICAN: >60
GLUCOSE BLD-MCNC: 100 MG/DL (ref 70–99)
HCT VFR BLD CALC: 27.5 % (ref 36–48)
HCT VFR BLD CALC: 27.6 % (ref 36–48)
HCT VFR BLD CALC: 27.7 % (ref 36–48)
HCT VFR BLD CALC: 28.3 % (ref 36–48)
HCT VFR BLD CALC: 28.9 % (ref 36–48)
HEMOGLOBIN: 9 G/DL (ref 12–16)
HEMOGLOBIN: 9 G/DL (ref 12–16)
HEMOGLOBIN: 9.2 G/DL (ref 12–16)
HEMOGLOBIN: 9.4 G/DL (ref 12–16)
HEMOGLOBIN: 9.4 G/DL (ref 12–16)
LYMPHOCYTES ABSOLUTE: 1.4 K/UL (ref 1–5.1)
LYMPHOCYTES RELATIVE PERCENT: 23 %
MCH RBC QN AUTO: 28.2 PG (ref 26–34)
MCHC RBC AUTO-ENTMCNC: 32.6 G/DL (ref 31–36)
MCV RBC AUTO: 86.6 FL (ref 80–100)
MONOCYTES ABSOLUTE: 0.5 K/UL (ref 0–1.3)
MONOCYTES RELATIVE PERCENT: 8 %
NEUTROPHILS ABSOLUTE: 4.2 K/UL (ref 1.7–7.7)
NEUTROPHILS RELATIVE PERCENT: 67.4 %
PDW BLD-RTO: 15.6 % (ref 12.4–15.4)
PLATELET # BLD: 261 K/UL (ref 135–450)
PMV BLD AUTO: 6.9 FL (ref 5–10.5)
POTASSIUM REFLEX MAGNESIUM: 3.6 MMOL/L (ref 3.5–5.1)
RBC # BLD: 3.19 M/UL (ref 4–5.2)
SODIUM BLD-SCNC: 143 MMOL/L (ref 136–145)
WBC # BLD: 6.3 K/UL (ref 4–11)

## 2021-02-27 PROCEDURE — 2580000003 HC RX 258: Performed by: NURSE PRACTITIONER

## 2021-02-27 PROCEDURE — 2700000000 HC OXYGEN THERAPY PER DAY

## 2021-02-27 PROCEDURE — 6360000002 HC RX W HCPCS: Performed by: NURSE PRACTITIONER

## 2021-02-27 PROCEDURE — 85025 COMPLETE CBC W/AUTO DIFF WBC: CPT

## 2021-02-27 PROCEDURE — C9113 INJ PANTOPRAZOLE SODIUM, VIA: HCPCS | Performed by: NURSE PRACTITIONER

## 2021-02-27 PROCEDURE — 94760 N-INVAS EAR/PLS OXIMETRY 1: CPT

## 2021-02-27 PROCEDURE — 99232 SBSQ HOSP IP/OBS MODERATE 35: CPT | Performed by: SURGERY

## 2021-02-27 PROCEDURE — 85018 HEMOGLOBIN: CPT

## 2021-02-27 PROCEDURE — APPSS15 APP SPLIT SHARED TIME 0-15 MINUTES: Performed by: PHYSICIAN ASSISTANT

## 2021-02-27 PROCEDURE — 6370000000 HC RX 637 (ALT 250 FOR IP): Performed by: NURSE PRACTITIONER

## 2021-02-27 PROCEDURE — 74018 RADEX ABDOMEN 1 VIEW: CPT

## 2021-02-27 PROCEDURE — APPNB30 APP NON BILLABLE TIME 0-30 MINS: Performed by: PHYSICIAN ASSISTANT

## 2021-02-27 PROCEDURE — 85014 HEMATOCRIT: CPT

## 2021-02-27 PROCEDURE — 1200000000 HC SEMI PRIVATE

## 2021-02-27 PROCEDURE — 6370000000 HC RX 637 (ALT 250 FOR IP)

## 2021-02-27 PROCEDURE — 2580000003 HC RX 258: Performed by: HOSPITALIST

## 2021-02-27 PROCEDURE — 6360000002 HC RX W HCPCS: Performed by: HOSPITALIST

## 2021-02-27 PROCEDURE — 36415 COLL VENOUS BLD VENIPUNCTURE: CPT

## 2021-02-27 PROCEDURE — 80048 BASIC METABOLIC PNL TOTAL CA: CPT

## 2021-02-27 RX ORDER — PRAVASTATIN SODIUM 20 MG
20 TABLET ORAL NIGHTLY
Status: DISCONTINUED | OUTPATIENT
Start: 2021-02-27 | End: 2021-03-05 | Stop reason: HOSPADM

## 2021-02-27 RX ORDER — CARBIDOPA AND LEVODOPA 50; 200 MG/1; MG/1
1 TABLET, EXTENDED RELEASE ORAL NIGHTLY
Status: DISCONTINUED | OUTPATIENT
Start: 2021-02-27 | End: 2021-03-05 | Stop reason: HOSPADM

## 2021-02-27 RX ORDER — VERAPAMIL HYDROCHLORIDE 240 MG/1
360 TABLET, FILM COATED, EXTENDED RELEASE ORAL EVERY MORNING
Status: DISCONTINUED | OUTPATIENT
Start: 2021-02-28 | End: 2021-03-05 | Stop reason: HOSPADM

## 2021-02-27 RX ORDER — MINERAL OIL AND WHITE PETROLATUM 150; 830 MG/G; MG/G
OINTMENT OPHTHALMIC PRN
Status: DISCONTINUED | OUTPATIENT
Start: 2021-02-27 | End: 2021-02-27 | Stop reason: ALTCHOICE

## 2021-02-27 RX ORDER — LISINOPRIL 5 MG/1
5 TABLET ORAL DAILY
Status: DISCONTINUED | OUTPATIENT
Start: 2021-02-27 | End: 2021-03-05 | Stop reason: HOSPADM

## 2021-02-27 RX ORDER — RIVASTIGMINE TARTRATE 1.5 MG/1
1.5 CAPSULE ORAL 2 TIMES DAILY
Status: DISCONTINUED | OUTPATIENT
Start: 2021-02-27 | End: 2021-03-05 | Stop reason: HOSPADM

## 2021-02-27 RX ORDER — LACTOBACILLUS RHAMNOSUS GG 10B CELL
1 CAPSULE ORAL 2 TIMES DAILY WITH MEALS
Status: DISCONTINUED | OUTPATIENT
Start: 2021-02-27 | End: 2021-03-05 | Stop reason: HOSPADM

## 2021-02-27 RX ORDER — FAMOTIDINE 20 MG/1
20 TABLET, FILM COATED ORAL 2 TIMES DAILY
Status: DISCONTINUED | OUTPATIENT
Start: 2021-02-27 | End: 2021-02-27

## 2021-02-27 RX ADMIN — RIVASTIGMINE TARTRATE 1.5 MG: 1.5 CAPSULE ORAL at 22:39

## 2021-02-27 RX ADMIN — LISINOPRIL 5 MG: 5 TABLET ORAL at 15:49

## 2021-02-27 RX ADMIN — CARBIDOPA AND LEVODOPA 1 TABLET: 25; 100 TABLET ORAL at 15:49

## 2021-02-27 RX ADMIN — PANTOPRAZOLE SODIUM 8 MG/HR: 40 INJECTION, POWDER, FOR SOLUTION INTRAVENOUS at 15:30

## 2021-02-27 RX ADMIN — SODIUM CHLORIDE: 9 INJECTION, SOLUTION INTRAVENOUS at 22:47

## 2021-02-27 RX ADMIN — Medication 1 DROP: at 13:37

## 2021-02-27 RX ADMIN — SODIUM CHLORIDE: 9 INJECTION, SOLUTION INTRAVENOUS at 02:04

## 2021-02-27 RX ADMIN — PHENOL 1 SPRAY: 1.5 LIQUID ORAL at 11:12

## 2021-02-27 RX ADMIN — CEFTRIAXONE SODIUM 1000 MG: 1 INJECTION, POWDER, FOR SOLUTION INTRAMUSCULAR; INTRAVENOUS at 02:28

## 2021-02-27 RX ADMIN — PHENOL 1 SPRAY: 1.5 LIQUID ORAL at 08:22

## 2021-02-27 RX ADMIN — SERTRALINE HYDROCHLORIDE 50 MG: 50 TABLET ORAL at 15:49

## 2021-02-27 RX ADMIN — CARBIDOPA AND LEVODOPA 1 TABLET: 25; 100 TABLET ORAL at 18:26

## 2021-02-27 RX ADMIN — PANTOPRAZOLE SODIUM 8 MG/HR: 40 INJECTION, POWDER, FOR SOLUTION INTRAVENOUS at 06:12

## 2021-02-27 RX ADMIN — Medication 1 CAPSULE: at 15:49

## 2021-02-27 RX ADMIN — CEFEPIME HYDROCHLORIDE 2000 MG: 2 INJECTION, POWDER, FOR SOLUTION INTRAVENOUS at 15:48

## 2021-02-27 RX ADMIN — CARBIDOPA AND LEVODOPA 1 TABLET: 50; 200 TABLET, EXTENDED RELEASE ORAL at 22:39

## 2021-02-27 ASSESSMENT — PAIN DESCRIPTION - DESCRIPTORS: DESCRIPTORS: SORE

## 2021-02-27 ASSESSMENT — PAIN DESCRIPTION - PAIN TYPE: TYPE: ACUTE PAIN

## 2021-02-27 ASSESSMENT — PAIN SCALES - GENERAL
PAINLEVEL_OUTOF10: 0
PAINLEVEL_OUTOF10: 2

## 2021-02-27 ASSESSMENT — PAIN - FUNCTIONAL ASSESSMENT: PAIN_FUNCTIONAL_ASSESSMENT: ACTIVITIES ARE NOT PREVENTED

## 2021-02-27 ASSESSMENT — PAIN DESCRIPTION - FREQUENCY: FREQUENCY: CONTINUOUS

## 2021-02-27 NOTE — PLAN OF CARE
Problem: SAFETY  Goal: Free from accidental physical injury  2/27/2021 0148 by Jesus Casey RN  Outcome: Ongoing     Problem: SAFETY  Goal: Free from intentional harm  2/27/2021 0148 by Jesus Casey RN  Outcome: Ongoing     Problem: DAILY CARE  Goal: Daily care needs are met  2/27/2021 0148 by Jesus Casey RN  Outcome: Ongoing     Problem: PAIN  Goal: Patient's pain/discomfort is manageable  2/27/2021 0148 by Jesus Casey RN  Outcome: Ongoing     Problem: SKIN INTEGRITY  Goal: Skin integrity is maintained or improved  2/27/2021 0148 by Jesus Casey RN  Outcome: Ongoing     Problem: Falls - Risk of:  Goal: Will remain free from falls  Description: Will remain free from falls  Outcome: Ongoing  Goal: Absence of physical injury  Description: Absence of physical injury  Outcome: Ongoing     Problem: Skin Integrity:  Goal: Will show no infection signs and symptoms  Description: Will show no infection signs and symptoms  Outcome: Ongoing  Goal: Absence of new skin breakdown  Description: Absence of new skin breakdown  Outcome: Ongoing

## 2021-02-27 NOTE — PLAN OF CARE
Problem: SAFETY  Goal: Free from accidental physical injury  2/27/2021 1524 by Samantha Leone RN  Outcome: Ongoing     Problem: SAFETY  Goal: Free from intentional harm  2/27/2021 1524 by Samantha Leone RN  Outcome: Ongoing     Problem: DAILY CARE  Goal: Daily care needs are met  2/27/2021 1524 by Samantha Leone RN  Outcome: Ongoing     Problem: PAIN  Goal: Patient's pain/discomfort is manageable  2/27/2021 1524 by Samantha Leone RN  Outcome: Ongoing     Problem: SKIN INTEGRITY  Goal: Skin integrity is maintained or improved  2/27/2021 1524 by Samantha Leone RN  Outcome: Ongoing     Problem: KNOWLEDGE DEFICIT  Goal: Patient/S.O. demonstrates understanding of disease process, treatment plan, medications, and discharge instructions.   Outcome: Ongoing     Problem: DISCHARGE BARRIERS  Goal: Patient's continuum of care needs are met  Outcome: Ongoing     Problem: Falls - Risk of:  Goal: Will remain free from falls  Description: Will remain free from falls  2/27/2021 1524 by Samantha Leone RN  Outcome: Ongoing     Problem: Falls - Risk of:  Goal: Absence of physical injury  Description: Absence of physical injury  2/27/2021 1524 by Samantha Leone RN  Outcome: Ongoing     Problem: Skin Integrity:  Goal: Will show no infection signs and symptoms  Description: Will show no infection signs and symptoms  2/27/2021 1524 by Samantha Leone RN  Outcome: Ongoing     Problem: Skin Integrity:  Goal: Absence of new skin breakdown  Description: Absence of new skin breakdown  2/27/2021 1524 by Samantha Leone RN  Outcome: Ongoing

## 2021-02-27 NOTE — PROGRESS NOTES
Gastroenterology Progress Note    Mirella Cat is a 82 y.o. female patient. Active Problems:    SBO (small bowel obstruction) (HCC)    Femoral vein thrombosis (HCC)    Nausea and vomiting    History of pulmonary embolism  Resolved Problems:    * No resolved hospital problems. *      SUBJECTIVE:  Passed a little bit of gas. No bowel movements. Sore throat. No fevers. Current Facility-Administered Medications: pantoprazole (PROTONIX) 80 mg in sodium chloride 0.9 % 100 mL infusion, 8 mg/hr, Intravenous, Continuous  sodium chloride flush 0.9 % injection 10 mL, 10 mL, Intravenous, 2 times per day  sodium chloride flush 0.9 % injection 10 mL, 10 mL, Intravenous, PRN  acetaminophen (TYLENOL) tablet 650 mg, 650 mg, Oral, Q6H PRN **OR** acetaminophen (TYLENOL) suppository 650 mg, 650 mg, Rectal, Q6H PRN  0.9 % sodium chloride infusion, , Intravenous, Continuous  sodium chloride flush 0.9 % injection 10 mL, 10 mL, Intravenous, 2 times per day  sodium chloride flush 0.9 % injection 10 mL, 10 mL, Intravenous, PRN  sodium chloride flush 0.9 % injection 10 mL, 10 mL, Intravenous, 2 times per day  sodium chloride flush 0.9 % injection 10 mL, 10 mL, Intravenous, PRN  cefTRIAXone (ROCEPHIN) 1000 mg IVPB in 50 mL D5W minibag, 1,000 mg, Intravenous, Q24H  phenol 1.4 % mouth spray 1 spray, 1 spray, Mouth/Throat, Q2H PRN  promethazine (PHENERGAN) tablet 12.5 mg, 12.5 mg, Oral, Q6H PRN **OR** ondansetron (ZOFRAN) injection 4 mg, 4 mg, Intravenous, Q4H PRN    Physical    VITALS:  BP (!) 146/74   Pulse 73   Temp 97.9 °F (36.6 °C) (Oral)   Resp 20   Ht 4' 11\" (1.499 m)   Wt 144 lb 10 oz (65.6 kg)   SpO2 95%   BMI 29.21 kg/m²   TEMPERATURE:  Current - Temp: 97.9 °F (36.6 °C); Max - Temp  Av.9 °F (36.6 °C)  Min: 96.7 °F (35.9 °C)  Max: 98.6 °F (37 °C)    NAD  Eyes: No icterus  NG in place with brownish clear output.   RRR  Lungs CTA Bilaterally, normal effort  Abdomen soft, ND, NT, Bowel sounds normal.  Ext: no :  Should not get severe bleeding from the gastritis. Anti-coagulation will increase oozing from this but if indication for anti-coagulation is strong could resume now. I suspect okay to wait until Hgb stabilizes since she has a filter. Continue PPI drip for now to promote healing. Management of SBO per surgery. No further inpatient recs. Will sign off. Would discharge on bid pantoprazole for 1 month. Thank you for allowing me to participate in the care of your patient. Please feel free to contact me with any concerns.   [address], [place]

## 2021-02-27 NOTE — PROGRESS NOTES
Hospital Medicine Progress Note      Admit Date: 2/26/2021         Overnight Events: No    CC: F/U for SBO, UTI    HPI: The patient is a 82 yrs old female, who  has a past medical history of Contraindication to anticoagulation therapy, Hiatal hernia, High blood pressure, Neurogenic bladder, Parkinson disease ([address] pulmonary embolism, and Suprapubic catheter ([address] The patient had recently been admitted for R upper and lower lobe PE (12/31). She was initially treated with heparin gtt and was transitioned to PO eliquis. Initially there was some concern for [facility] given that she is a fall risk and has an IVC filter. Ultimately she was discharged on eliquis. At the time, US of BLE was negative for DVT. She presented to [facility] ER on the date of admission with complaints of abd pain, nausea, vomiting, and coffee ground emesis. Furthermore - she was also constipated. CT A/P showed a small bowel obstruction with transition point in the RLQ of the abdomen, a suspected thrombus in the R femoral vein, bilateral inguinal hernias and a hiatal hernia. H&H was 11.8 and 35.2 at the time of presentation - which was close to her baseline. She was admitted for further workup and treatment of SBO. NGT was inserted. PPI Gtt was initiated. AC was held. H&H was monitored. GI was consulted. The patient underwent EGD on 2/26/21, which showed GI bleeding from gastritis. General surgery was consulted but SBO was managed conservatively with NGT and bowel rest. The patient had been started on IV rocephin for presumed UTI. UC resulted positive for pseudomonas. Abx were adjusted to cefepime for pseudomonal coverage. Interval History/Subjective: No new complaints. She is feeling a bit better. She is beginning to pass flatus. Review of Systems:     The patient denied headaches, visual changes, LOC, SOB, CP, skin changes, new or worsening weakness or neuromuscular deficits. - ABD pain is improving. She is beginning to pass flatus. Comprehensive ROS negative except as mentioned above. Past Medical History:        Diagnosis Date    Contraindication to anticoagulation therapy     fall risk    Hiatal hernia     High blood pressure     Neurogenic bladder     [address] disease ([facility]     pulmonary embolism 12/13/2020    right upper and lower lobes    Suprapubic catheter [facility])        Past Surgical History:        Procedure Laterality Date    ABDOMEN SURGERY      bowel obstruction    APPENDECTOMY  1948    CYSTOSCOPY N/A 12/03/2020    CYSTOSCOPY,  WITH INTRAVESICAL INJECTION OF BOTOX 200UNITS, WITH SUPRAPUBIC TUBE EXCHANGE performed by Adam Costa MD at [id number] [facility]    IVC FILTER INSERTION Right 12/17/2020    Dr. Joana Calderon. [place] IVC filter    TONSILLECTOMY AND ADENOIDECTOMY  1943    VAGINA SURGERY  2006    repair vagina bladder and rectum       Allergies:  Caduet [amlodipine-atorvastatin], Estrogens conjugated, Penicillins, and Terramycin [oxytetracycline-lidocaine]    Past medical and surgical history reviewed. Any changes have been noted. PHYSICAL EXAM:  /65   Pulse 74   Temp 97.8 °F (36.6 °C) (Oral)   Resp 18   Ht 4' 11\" (1.499 m)   Wt 144 lb 10 oz (65.6 kg)   SpO2 93%   BMI 29.21 kg/m²       Intake/Output Summary (Last 24 hours) at 2/27/2021 1119  Last data filed at 2/27/2021 8854  Gross per 24 hour   Intake 2294.07 ml   Output 1300 ml   Net 994.07 ml       General: Alert and oriented. Sitting up in bed in NAD. Pleasant and cooperative. RN at bedside. HEENT: Normocephalic. Atraumatic. Pupils equal and reactive. EOM intact. Oral mucosa pink/moist/intact. NGT to LWS+. Bile colored drainage to tubing. Neck: Supple. Symmetrical. Trachea midline. Lungs: Clear to auscultation bilaterally. Respirations even and unlabored. Chest: Exam unremarkable. Cardiac: S1/S2 noted. Regular Rhythm and rate. Abdomen/GI: Soft. cefepime  2,000 mg Intravenous Q12H    lisinopril  5 mg Oral Daily    pravastatin  20 mg Oral Nightly    sertraline  50 mg Oral Daily    lactobacillus  1 capsule Oral BID WC    carbidopa-levodopa  1 tablet Oral 5x Daily    [START ON 2/28/2021] verapamil  360 mg Oral QAM    rivastigmine  1.5 mg Oral BID    carbidopa-levodopa  1 tablet Oral Nightly    sodium chloride flush  10 mL Intravenous 2 times per day    sodium chloride flush  10 mL Intravenous 2 times per day    sodium chloride flush  10 mL Intravenous 2 times per day     Continuous Infusions:   pantoprozole (PROTONIX) infusion 8 mg/hr (02/27/21 0612)    sodium chloride 100 mL/hr at 02/27/21 0204     PRN Meds:.sodium chloride flush, acetaminophen **OR** acetaminophen, sodium chloride flush, sodium chloride flush, phenol, promethazine **OR** ondansetron    Assessment & Plan:        Small Bowel Obstruction  IV fluids  NG tube to LWS. Diet NPO Effective Now  PRN analgesia  No peritoneal signs  General Surgery Consulted. Appreciate assistance. GI consulted. Appreciate assistance. Acute GI Bleed  Transfuse as needed to maintain hgb > 7  Serial h & h  PPI gtt  IVF  GI consulted. Appreciate recs. - s/p EGD 2/26/21  - Bleeding is likely coming from gastritis  Currently holding eliquis/ASA     Presumed Urinary Tract Infection, POA - due to pseudomonas  UA +.   UC preliminary result growing pseudomonas. Rocephin changed to cefepime. Follow UC results and adjust abx as indicated by culture results    Recent PE (12/20)   Had IVC filter placed at that time due to concerns of fall risk - though was started on eliquis. Suspected R  Femoral thrombus on CT 2/26, however US doppler was negative 21/77   - Complication of IVC placement? Currently holding eliquis due to GIB    HTN  Continue medications as ordered  Monitor BP  Titrate medications as needed.     HLD  Statin  Monitor for S/S of Rhabdomyolysis     Parkinson's disease  Takes sinemet at home Home meds reviewed and ordered. Neurogenic bladder  Has indwelling suprapubic catheter.      Continue current regimen/therapies. Monitor. Adjust medical regimen as appropriate. Body mass index is 29.21 kg/m². The patient and / or the family were informed of the results of any tests, a time was given to answer questions, a plan was proposed and they agreed with plan.     DVT prophylaxis: [] Lovenox  [] SQ Heparin  [x] SCDs because of acute GIB  [] warfarin/oral direct thrombin inhibitor [] Encourage ambulation    GI prophylaxis: [x] PPI/[id number]  [] not indicated    Probiotic if on abx: [x] Yes [] No [] Not Indicated    Diet: Diet NPO Effective Now    Consults:  IP CONSULT TO HOSPITALIST  IP CONSULT TO GENERAL SURGERY  IP CONSULT TO GENERAL SURGERY  IP CONSULT TO GI  IP CONSULT TO SPIRITUAL SERVICES    Disposition:  [] Home  [] Home with home health [] Rehab [] Psych [] SNF  [] LTAC  [] Long term nursing home or group home [] Transfer to ICU  [] Transfer to PCU [] Other:  [x] TBD    Code Status: DNR-CCA    ELOS: WILMAR Maurice - NP  02/27/21

## 2021-02-27 NOTE — PROGRESS NOTES
Shift assessment completed. See flow sheets. VSS. Patient resting quietly. Gave prn mouth spray for sore throat after having patient rinse mouth. Repositioned and placed wedge to reduce pressure on right hip. No further needs at this time. Will monitor.

## 2021-02-27 NOTE — PROGRESS NOTES
General and Vascular Surgery                                                           Daily Progress Note                                                             Tommy Watts PA-C     Pt Name: Lauren Park  Medical Record Number: [id number]  Date of Birth 1935   Today's Date: 2/27/2021      ASSESSMENT/PLAN  Small bowel obstruction              On CT it appears to have a transition in pelvis              Continue NG for now   +flatulence. Denies any BM              Serial exams    +abdominal distention   Abdominal xray this AM   +UTI - Continue antibiotics  UGI bleed              EGD noted today              PPI              Hold anticoagulation   Continue to monitor Hg levels: Hgb: 11.8-->10.4-->10.3--> 9.0  EDUCATION  Patient educated about their illness/diagnosis, stated above, and all questions answered. We discussed the importance of nutrition, medications they are taking, and healthy lifestyle. Ceasar Valerio is unchanged from yesterday. Pain is well controlled. OBJECTIVE  VITALS:  height is 4' 11\" (1.499 m) and weight is 144 lb 10 oz (65.6 kg). Her oral temperature is 98 °F (36.7 °C). Her blood pressure is 144/75 (abnormal) and her pulse is 76. Her respiration is 18 and oxygen saturation is 93%. VITALS:  BP (!) 144/75   Pulse 76   Temp 98 °F (36.7 °C) (Oral)   Resp 18   Ht 4' 11\" (1.499 m)   Wt 144 lb 10 oz (65.6 kg)   SpO2 93%   BMI 29.21 kg/m²   GENERAL: alert, no distress  ABDOMEN: tenderness present- mild,  without rebound and guarding and distention present  I/O last 3 completed shifts: In: 2294.1 [I.V.:2244.1; IV Piggyback:50]  Out: 6493 [Urine:1000; Emesis/NG output:750]  No intake/output data recorded.     LABS  Recent Labs     02/26/21  0039 02/26/21  0235 02/26/21  0235 02/27/21  0544   WBC 9.8  --    < > 6.3   HGB 11.8*  --    < > 9.0*   HCT 35.2*  --    < > 27.6*     --    < > 261   *  --   --  143 K 4.1  --   --  3.6   CL 98*  --   --  109   CO2 24  --   --  26   BUN 19  --   --  19   CREATININE <0.5*  --   --  0.5*   CALCIUM 9.0  --   --  7.6*   INR 1.54*  --   --   --    AST 19  --   --   --    ALT 17  --   --   --    BILITOT <0.2  --   --   --    NITRU  --  POSITIVE*  --   --    COLORU  --  DK YELLOW  --   --    BACTERIA  --  4+*  --   --     < > = values in this interval not displayed. CBC:   Lab Results   Component Value Date    WBC 6.3 02/27/2021    RBC 3.19 02/27/2021    HGB 9.0 02/27/2021    HCT 27.6 02/27/2021    MCV 86.6 02/27/2021    MCH 28.2 02/27/2021    MCHC 32.6 02/27/2021    RDW 15.6 02/27/2021     02/27/2021    MPV 6.9 02/27/2021     CMP:    Lab Results   Component Value Date     02/27/2021    K 3.6 02/27/2021     02/27/2021    CO2 26 02/27/2021    BUN 19 02/27/2021    CREATININE 0.5 02/27/2021    GFRAA >60 02/27/2021    AGRATIO 1.0 02/26/2021    LABGLOM >60 02/27/2021    GLUCOSE 100 02/27/2021    PROT 7.2 02/26/2021    LABALBU 3.6 02/26/2021    CALCIUM 7.6 02/27/2021    BILITOT <0.2 02/26/2021    ALKPHOS 102 02/26/2021    AST 19 02/26/2021    ALT 17 02/26/2021         Jeremie Chen PA-C  Electronically signed 2/27/2021 at 9:55 AM      Surgery Staff  I have examined this patient and read and agree with the note by Iftikhar Johnston PA-C from today. Passing some flatus. Mild RLQ tenderness. +distention  H/H with slight drift downward    1. SBO vs ileus   - continue NG. Repeat films today. Continue abx for UTI    2. UGI bleed   -gastritis on EGD. Continue PPI  -Hold anticoagulation with filter in place.   Trend H/H      Electronically signed by Claudette Eckert MD on 2/27/2021 at 10:02 AM

## 2021-02-27 NOTE — PROGRESS NOTES
4 Eyes Skin Assessment     The patient is being assess for  Admission    I agree that 2 RN's have performed a thorough Head to Toe Skin Assessment on the patient. ALL assessment sites listed below have been assessed on admission. Areas assessed by both nurses:   [x]   Head, Face, and Ears   [x]   Shoulders, Back, and Chest  [x]   Arms, Elbows, and Hands   [x]   Coccyx, Sacrum, and Ischum  [x]   Legs, Feet, and Heels        Does the Patient have Skin Breakdown?   No         Raheem Prevention initiated:  Yes   Wound Care Orders initiated:  No      [facility] nurse consulted for Pressure Injury (Stage 3,4, Unstageable, DTI, NWPT, and Complex wounds):  No      Nurse 1 eSignature: Electronically signed by Dorisann Prader, RN on 2/27/21 at 5:59 AM EST    **SHARE this note so that the co-signing nurse is able to place an eSignature**    Nurse 2 eSignature: {Esignature:[id number]}

## 2021-02-28 LAB
ANION GAP SERPL CALCULATED.3IONS-SCNC: 12 MMOL/L (ref 3–16)
BASOPHILS ABSOLUTE: 0 K/UL (ref 0–0.2)
BASOPHILS RELATIVE PERCENT: 0.5 %
BUN BLDV-MCNC: 9 MG/DL (ref 7–20)
CALCIUM SERPL-MCNC: 7.4 MG/DL (ref 8.3–10.6)
CHLORIDE BLD-SCNC: 102 MMOL/L (ref 99–110)
CO2: 23 MMOL/L (ref 21–32)
CREAT SERPL-MCNC: <0.5 MG/DL (ref 0.6–1.2)
EOSINOPHILS ABSOLUTE: 0.2 K/UL (ref 0–0.6)
EOSINOPHILS RELATIVE PERCENT: 2.3 %
GFR AFRICAN AMERICAN: >60
GFR NON-AFRICAN AMERICAN: >60
GLUCOSE BLD-MCNC: 85 MG/DL (ref 70–99)
HCT VFR BLD CALC: 27.6 % (ref 36–48)
HEMOGLOBIN: 9.2 G/DL (ref 12–16)
LYMPHOCYTES ABSOLUTE: 1.3 K/UL (ref 1–5.1)
LYMPHOCYTES RELATIVE PERCENT: 18.6 %
MAGNESIUM: 2.1 MG/DL (ref 1.8–2.4)
MCH RBC QN AUTO: 28.7 PG (ref 26–34)
MCHC RBC AUTO-ENTMCNC: 33.1 G/DL (ref 31–36)
MCV RBC AUTO: 86.7 FL (ref 80–100)
MONOCYTES ABSOLUTE: 0.5 K/UL (ref 0–1.3)
MONOCYTES RELATIVE PERCENT: 7.5 %
NEUTROPHILS ABSOLUTE: 4.8 K/UL (ref 1.7–7.7)
NEUTROPHILS RELATIVE PERCENT: 71.1 %
ORGANISM: ABNORMAL
PDW BLD-RTO: 15.7 % (ref 12.4–15.4)
PLATELET # BLD: 268 K/UL (ref 135–450)
PMV BLD AUTO: 6.8 FL (ref 5–10.5)
POTASSIUM REFLEX MAGNESIUM: 3.4 MMOL/L (ref 3.5–5.1)
RBC # BLD: 3.19 M/UL (ref 4–5.2)
SODIUM BLD-SCNC: 137 MMOL/L (ref 136–145)
URINE CULTURE, ROUTINE: ABNORMAL
WBC # BLD: 6.7 K/UL (ref 4–11)

## 2021-02-28 PROCEDURE — 6360000002 HC RX W HCPCS: Performed by: NURSE PRACTITIONER

## 2021-02-28 PROCEDURE — 94760 N-INVAS EAR/PLS OXIMETRY 1: CPT

## 2021-02-28 PROCEDURE — 2580000003 HC RX 258: Performed by: HOSPITALIST

## 2021-02-28 PROCEDURE — 85025 COMPLETE CBC W/AUTO DIFF WBC: CPT

## 2021-02-28 PROCEDURE — 2700000000 HC OXYGEN THERAPY PER DAY

## 2021-02-28 PROCEDURE — 83735 ASSAY OF MAGNESIUM: CPT

## 2021-02-28 PROCEDURE — 99232 SBSQ HOSP IP/OBS MODERATE 35: CPT | Performed by: SURGERY

## 2021-02-28 PROCEDURE — C9113 INJ PANTOPRAZOLE SODIUM, VIA: HCPCS | Performed by: NURSE PRACTITIONER

## 2021-02-28 PROCEDURE — 36415 COLL VENOUS BLD VENIPUNCTURE: CPT

## 2021-02-28 PROCEDURE — 1200000000 HC SEMI PRIVATE

## 2021-02-28 PROCEDURE — 6370000000 HC RX 637 (ALT 250 FOR IP): Performed by: NURSE PRACTITIONER

## 2021-02-28 PROCEDURE — 2580000003 HC RX 258: Performed by: ANESTHESIOLOGY

## 2021-02-28 PROCEDURE — 2580000003 HC RX 258: Performed by: NURSE PRACTITIONER

## 2021-02-28 PROCEDURE — 80048 BASIC METABOLIC PNL TOTAL CA: CPT

## 2021-02-28 PROCEDURE — 6370000000 HC RX 637 (ALT 250 FOR IP): Performed by: SURGERY

## 2021-02-28 RX ORDER — BISACODYL 10 MG
10 SUPPOSITORY, RECTAL RECTAL ONCE
Status: COMPLETED | OUTPATIENT
Start: 2021-02-28 | End: 2021-02-28

## 2021-02-28 RX ORDER — PANTOPRAZOLE SODIUM 40 MG/10ML
40 INJECTION, POWDER, LYOPHILIZED, FOR SOLUTION INTRAVENOUS DAILY
Status: DISCONTINUED | OUTPATIENT
Start: 2021-02-28 | End: 2021-02-28 | Stop reason: CLARIF

## 2021-02-28 RX ORDER — MIRTAZAPINE 15 MG/1
7.5 TABLET, FILM COATED ORAL NIGHTLY
Status: DISCONTINUED | OUTPATIENT
Start: 2021-02-28 | End: 2021-03-05 | Stop reason: HOSPADM

## 2021-02-28 RX ORDER — SODIUM CHLORIDE 9 MG/ML
10 INJECTION INTRAVENOUS DAILY
Status: DISCONTINUED | OUTPATIENT
Start: 2021-02-28 | End: 2021-02-28 | Stop reason: CLARIF

## 2021-02-28 RX ORDER — ESOMEPRAZOLE SODIUM 40 MG/5ML
40 INJECTION INTRAVENOUS
Status: DISCONTINUED | OUTPATIENT
Start: 2021-03-01 | End: 2021-03-05 | Stop reason: HOSPADM

## 2021-02-28 RX ADMIN — PANTOPRAZOLE SODIUM 8 MG/HR: 40 INJECTION, POWDER, FOR SOLUTION INTRAVENOUS at 02:51

## 2021-02-28 RX ADMIN — CARBIDOPA AND LEVODOPA 1 TABLET: 25; 100 TABLET ORAL at 15:07

## 2021-02-28 RX ADMIN — SODIUM CHLORIDE: 9 INJECTION, SOLUTION INTRAVENOUS at 08:36

## 2021-02-28 RX ADMIN — RIVASTIGMINE TARTRATE 1.5 MG: 1.5 CAPSULE ORAL at 09:01

## 2021-02-28 RX ADMIN — CARBIDOPA AND LEVODOPA 1 TABLET: 25; 100 TABLET ORAL at 12:21

## 2021-02-28 RX ADMIN — CEFEPIME HYDROCHLORIDE 2000 MG: 2 INJECTION, POWDER, FOR SOLUTION INTRAVENOUS at 03:38

## 2021-02-28 RX ADMIN — LISINOPRIL 5 MG: 5 TABLET ORAL at 09:01

## 2021-02-28 RX ADMIN — CARBIDOPA AND LEVODOPA 1 TABLET: 25; 100 TABLET ORAL at 06:07

## 2021-02-28 RX ADMIN — SODIUM CHLORIDE: 9 INJECTION, SOLUTION INTRAVENOUS at 18:02

## 2021-02-28 RX ADMIN — Medication 1 DROP: at 09:02

## 2021-02-28 RX ADMIN — Medication 1 CAPSULE: at 09:01

## 2021-02-28 RX ADMIN — CARBIDOPA AND LEVODOPA 1 TABLET: 50; 200 TABLET, EXTENDED RELEASE ORAL at 21:18

## 2021-02-28 RX ADMIN — RIVASTIGMINE TARTRATE 1.5 MG: 1.5 CAPSULE ORAL at 21:18

## 2021-02-28 RX ADMIN — CARBIDOPA AND LEVODOPA 1 TABLET: 25; 100 TABLET ORAL at 09:01

## 2021-02-28 RX ADMIN — BISACODYL 10 MG: 10 SUPPOSITORY RECTAL at 12:22

## 2021-02-28 RX ADMIN — CARBIDOPA AND LEVODOPA 1 TABLET: 25; 100 TABLET ORAL at 18:00

## 2021-02-28 RX ADMIN — SODIUM CHLORIDE, PRESERVATIVE FREE 10 ML: 5 INJECTION INTRAVENOUS at 21:25

## 2021-02-28 RX ADMIN — PHENOL 1 SPRAY: 1.5 LIQUID ORAL at 16:25

## 2021-02-28 RX ADMIN — Medication 1 CAPSULE: at 18:00

## 2021-02-28 RX ADMIN — SERTRALINE HYDROCHLORIDE 50 MG: 50 TABLET ORAL at 09:01

## 2021-02-28 RX ADMIN — MIRTAZAPINE 7.5 MG: 15 TABLET, FILM COATED ORAL at 21:18

## 2021-02-28 RX ADMIN — VERAPAMIL HYDROCHLORIDE 360 MG: 240 TABLET, FILM COATED, EXTENDED RELEASE ORAL at 09:01

## 2021-02-28 RX ADMIN — CEFEPIME HYDROCHLORIDE 2000 MG: 2 INJECTION, POWDER, FOR SOLUTION INTRAVENOUS at 15:26

## 2021-02-28 ASSESSMENT — PAIN SCALES - GENERAL
PAINLEVEL_OUTOF10: 0
PAINLEVEL_OUTOF10: 0

## 2021-02-28 NOTE — PROGRESS NOTES
General and Vascular Surgery                                                           Daily Progress Note                                                                  Pt Name: Esa Roman  Medical Record Number: [id number]  Date of Birth 1935   Today's Date: 2/28/2021      ASSESSMENT/PLAN  SBO vs ileus 2/2 UTI  -passing flatus but no BM  -abdomen mildly distended. nontender  -continue NG  -trial suppository  -continue merrem for pseudomonas UTI    UGI bleed - gastritis on EGD  -H/H stable. Change PPI gtt to IV      SUBJECTIVE  Herchel Lock is unchanged from yesterday. Pain is well controlled. Minimal flatus but no BM    OBJECTIVE  VITALS:  height is 4' 11\" (1.499 m) and weight is 144 lb 13.5 oz (65.7 kg). Her oral temperature is 98.8 °F (37.1 °C). Her blood pressure is 154/72 (abnormal) and her pulse is 78. Her respiration is 20 and oxygen saturation is 95%. VITALS:  BP (!) 154/72   Pulse 78   Temp 98.8 °F (37.1 °C) (Oral)   Resp 20   Ht 4' 11\" (1.499 m)   Wt 144 lb 13.5 oz (65.7 kg)   SpO2 95%   BMI 29.25 kg/m²   GENERAL: alert, no distress  ABDOMEN: soft with mod distention. nontender  I/O last 3 completed shifts:   In: 2603 [I.V.:2503; IV Piggyback:100]  Out: 3501 [Urine:2075; Emesis/NG output:150]  I/O this shift:  In: -   Out: 900 [Urine:900]    LABS  Recent Labs     02/26/21  0039 02/26/21  0235 02/26/21  0235 02/28/21  0442   WBC 9.8  --    < > 6.7   HGB 11.8*  --    < > 9.2*   HCT 35.2*  --    < > 27.6*     --    < > 268   *  --    < > 137   K 4.1  --    < > 3.4*   CL 98*  --    < > 102   CO2 24  --    < > 23   BUN 19  --    < > 9   CREATININE <0.5*  --    < > <0.5*   MG  --   --   --  2.10   CALCIUM 9.0  --    < > 7.4*   INR 1.54*  --   --   --    AST 19  --   --   --    ALT 17  --   --   --    BILITOT <0.2  --   --   --    NITRU  --  POSITIVE*  --   --    COLORU  --  DK YELLOW  --   --    BACTERIA  --  4+*  --   -- < > = values in this interval not displayed.      CBC:   Lab Results   Component Value Date    WBC 6.7 02/28/2021    RBC 3.19 02/28/2021    HGB 9.2 02/28/2021    HCT 27.6 02/28/2021    MCV 86.7 02/28/2021    MCH 28.7 02/28/2021    MCHC 33.1 02/28/2021    RDW 15.7 02/28/2021     02/28/2021    MPV 6.8 02/28/2021     CMP:    Lab Results   Component Value Date     02/28/2021    K 3.4 02/28/2021     02/28/2021    CO2 23 02/28/2021    BUN 9 02/28/2021    CREATININE <0.5 02/28/2021    GFRAA >60 02/28/2021    AGRATIO 1.0 02/26/2021    LABGLOM >60 02/28/2021    GLUCOSE 85 02/28/2021    PROT 7.2 02/26/2021    LABALBU 3.6 02/26/2021    CALCIUM 7.4 02/28/2021    BILITOT <0.2 02/26/2021    ALKPHOS 102 02/26/2021    AST 19 02/26/2021    ALT 17 02/26/2021           Electronically signed by Esa Bone MD on 2/28/2021 at 10:02 AM

## 2021-02-28 NOTE — PLAN OF CARE
Problem: SAFETY  Goal: Free from accidental physical injury  2/28/2021 0148 by Hollie Waggoner RN  Outcome: Ongoing     Problem: SAFETY  Goal: Free from intentional harm  2/28/2021 0148 by Hollie Waggoner RN  Outcome: Ongoing     Problem: DAILY CARE  Goal: Daily care needs are met  2/28/2021 0148 by Hollie Waggoner RN  Outcome: Ongoing     Problem: PAIN  Goal: Patient's pain/discomfort is manageable  2/28/2021 0148 by Hollie Waggoner RN  Outcome: Ongoing     Problem: SKIN INTEGRITY  Goal: Skin integrity is maintained or improved  2/28/2021 0148 by Hollie Waggoner RN  Outcome: Ongoing     Problem: KNOWLEDGE DEFICIT  Goal: Patient/S.O. demonstrates understanding of disease process, treatment plan, medications, and discharge instructions.   2/28/2021 0148 by Hollie Waggoner RN  Outcome: Ongoing     Problem: Falls - Risk of:  Goal: Will remain free from falls  Description: Will remain free from falls  2/28/2021 0148 by Hollie Waggoner RN  Outcome: Ongoing     Problem: Falls - Risk of:  Goal: Absence of physical injury  Description: Absence of physical injury  2/28/2021 0148 by Hollie Waggoner RN  Outcome: Ongoing     Problem: Skin Integrity:  Goal: Will show no infection signs and symptoms  Description: Will show no infection signs and symptoms  2/28/2021 0148 by Hollie Waggoner RN  Outcome: Ongoing     Problem: Skin Integrity:  Goal: Absence of new skin breakdown  Description: Absence of new skin breakdown  2/28/2021 0148 by Hollie Waggoner RN  Outcome: Ongoing

## 2021-02-28 NOTE — PROGRESS NOTES
pain.     Comprehensive ROS negative except as mentioned above. Past Medical History:        Diagnosis Date    Contraindication to anticoagulation therapy     fall risk    Hiatal hernia     High blood pressure     Neurogenic bladder     [facility] disease ([facility] [address]     pulmonary embolism 12/13/2020    right upper and lower lobes    Suprapubic catheter [facility])        Past Surgical History:        Procedure Laterality Date    ABDOMEN SURGERY      bowel obstruction    APPENDECTOMY  1948    CYSTOSCOPY N/A 12/03/2020    CYSTOSCOPY,  WITH INTRAVESICAL INJECTION OF BOTOX 200UNITS, WITH SUPRAPUBIC TUBE EXCHANGE performed by [place] Kaiser, [place] at [address] [facility]    IVC FILTER INSERTION Right 12/17/2020    Dr. Mili Goodwin. [place] retreivable IVC filter    TONSILLECTOMY AND ADENOIDECTOMY  1943    VAGINA SURGERY  2006    repair vagina bladder and rectum       Allergies:  Caduet [amlodipine-atorvastatin], Estrogens conjugated, Penicillins, and Terramycin [oxytetracycline-lidocaine]    Past medical and surgical history reviewed. Any changes have been noted. PHYSICAL EXAM:  BP (!) 154/72   Pulse 78   Temp 98.8 °F (37.1 °C) (Oral)   Resp 20   Ht 4' 11\" (1.499 m)   Wt 144 lb 13.5 oz (65.7 kg)   SpO2 95%   BMI 29.25 kg/m²       Intake/Output Summary (Last 24 hours) at 2/28/2021 1027  Last data filed at 2/28/2021 0840  Gross per 24 hour   Intake 2603 ml   Output 3125 ml   Net -522 ml       General: Alert and oriented. Sitting up in bed in NAD. Pleasant and cooperative. Daughter at bedside. HEENT: Normocephalic. Atraumatic. Pupils equal and reactive. EOM intact. Oral mucosa pink/moist/intact. NGT to LWS+. Much less drainage noted to tubing/cannister today. Drainage is bile colored. Neck: Supple. Symmetrical. Trachea midline. Lungs: Clear to auscultation bilaterally. Respirations even and unlabored. Chest: Exam unremarkable.   Cardiac: S1/S2 noted. Regular Rhythm and rate. Abdomen/GI: Soft. Non-tender. Mild distention. BS+. Extremities: PP+. Atraumatic. No redness/cyanosis. Trace edema BLE. Brisk cap refill. Skin: Dry and intact. No lesions noted, except as above. Neuro: Grossly intact. No focal deficits noted. LABS:    Lab Results   Component Value Date    WBC 6.7 02/28/2021    HGB 9.2 (L) 02/28/2021    HCT 27.6 (L) 02/28/2021    MCV 86.7 02/28/2021     02/28/2021    LYMPHOPCT 18.6 02/28/2021    RBC 3.19 (L) 02/28/2021    MCH 28.7 02/28/2021    MCHC 33.1 02/28/2021    RDW 15.7 (H) 02/28/2021       Lab Results   Component Value Date    CREATININE <0.5 (L) 02/28/2021    BUN 9 02/28/2021     02/28/2021    K 3.4 (L) 02/28/2021     02/28/2021    CO2 23 02/28/2021       Lab Results   Component Value Date    MG 2.10 02/28/2021       Lab Results   Component Value Date    ALT 17 02/26/2021    AST 19 02/26/2021    ALKPHOS 102 02/26/2021    BILITOT <0.2 02/26/2021        No flowsheet data found. No results found for: LABA1C    Imaging:  XR ABDOMEN (KUB) (SINGLE AP VIEW)   Final Result   NG tube tip and proximal side-port reside in the stomach. There are   gas-filled loops of large and small bowel suggesting an ileus. The distal   small bowel obstruction noted on CT from 1 day earlier is not perceived on   the conventional radiographs. CT ABDOMEN PELVIS W IV CONTRAST Additional Contrast? None   Final Result   1. Small bowel obstruction with a transition point in the right lower   quadrant of the abdomen. 2. Gallstones without adjacent inflammatory changes. 3. Hiatal hernia. 4. Trace right pleural effusion. 5. Suspected thrombus in the right femoral vein. 6. Bilateral inguinal hernias. 7. Suprapubic catheter in the bladder lumen. XR CHEST PORTABLE   Final Result   Low lung volumes with bibasilar atelectasis, left side greater than right. There may be trace bilateral pleural effusions. Scheduled and prn Medications:    Scheduled Meds:   bisacodyl  10 mg Rectal Once    [START ON 3/1/2021] esomeprazole  40 mg Intravenous QAM AC    cefepime  2,000 mg Intravenous Q12H    lisinopril  5 mg Oral Daily    pravastatin  20 mg Oral Nightly    sertraline  50 mg Oral Daily    lactobacillus  1 capsule Oral BID WC    carbidopa-levodopa  1 tablet Oral 5x Daily    verapamil  360 mg Oral QAM    rivastigmine  1.5 mg Oral BID    carbidopa-levodopa  1 tablet Oral Nightly    sodium chloride flush  10 mL Intravenous 2 times per day    sodium chloride flush  10 mL Intravenous 2 times per day    sodium chloride flush  10 mL Intravenous 2 times per day     Continuous Infusions:   sodium chloride 100 mL/hr at 02/28/21 0836     PRN Meds:.polyvinyl alcohol-povidone, sodium chloride flush, acetaminophen **OR** acetaminophen, sodium chloride flush, sodium chloride flush, phenol, promethazine **OR** ondansetron    Assessment & Plan:        Small Bowel Obstruction  IV fluids  NG tube to LWS. Diet NPO Effective Now  PRN analgesia  No peritoneal signs  General Surgery following. Appreciate assistance. GI consulted. Appreciate assistance. Acute GI Bleed  Transfuse as needed to maintain hgb > 7  Serial h & h  PPI gtt  IVF  GI following. Appreciate recs. - s/p EGD 2/26/21  - Bleeding is likely coming from gastritis  Currently holding eliquis/ASA     Urinary Tract Infection, POA - due to pseudomonas   - Likely 2/2 chronic indwelling urinary catheter  UA +. UC positive for pseudomonas. Cefepime day 2/3  Sensitivities reviewed. Recent PE (12/20)   Had IVC filter placed at that time due to concerns of fall risk - though was started on eliquis. Suspected R  Femoral thrombus on CT 2/26, however US doppler was negative 65/71   - Complication of IVC placement? Currently holding eliquis due to GIB    HTN  Continue medications as ordered  Monitor BP  Titrate medications as needed.     HLD  Statin  Monitor for S/S of Rhabdomyolysis     Parkinson's disease  Takes sinemet at home   Home meds reviewed and ordered. Neurogenic bladder  Has chronic indwelling suprapubic catheter. Catheter should be changed every 30 days.      Continue current regimen/therapies. Monitor. Adjust medical regimen as appropriate. Body mass index is 29.25 kg/m². The patient and / or the family were informed of the results of any tests, a time was given to answer questions, a plan was proposed and they agreed with plan.     DVT prophylaxis: [] Lovenox  [] SQ Heparin  [x] SCDs because of acute GIB  [] warfarin/oral direct thrombin inhibitor [] Encourage ambulation    GI prophylaxis: [x] PPI/[id number]  [] not indicated    Probiotic if on abx: [x] Yes [] No [] Not Indicated    Diet: Diet NPO Effective Now    Consults:  IP CONSULT TO HOSPITALIST  IP CONSULT TO GENERAL SURGERY  IP CONSULT TO GENERAL SURGERY  IP CONSULT TO GI  IP CONSULT TO SPIRITUAL SERVICES    Disposition:  [] Home  [] Home with home health [] Rehab [] Psych [] SNF  [] LTAC  [] Long term nursing home or group home [] Transfer to ICU  [] Transfer to PCU [] Other:  [x] TBD    Code Status: DNR-CCA    ELOS: TBD      WILMAR Norwood NP  02/28/21

## 2021-03-01 LAB
ANION GAP SERPL CALCULATED.3IONS-SCNC: 14 MMOL/L (ref 3–16)
ANION GAP SERPL CALCULATED.3IONS-SCNC: 15 MMOL/L (ref 3–16)
BASOPHILS ABSOLUTE: 0 K/UL (ref 0–0.2)
BASOPHILS RELATIVE PERCENT: 0.5 %
BUN BLDV-MCNC: 6 MG/DL (ref 7–20)
BUN BLDV-MCNC: 7 MG/DL (ref 7–20)
CALCIUM SERPL-MCNC: 7.9 MG/DL (ref 8.3–10.6)
CALCIUM SERPL-MCNC: 7.9 MG/DL (ref 8.3–10.6)
CHLORIDE BLD-SCNC: 100 MMOL/L (ref 99–110)
CHLORIDE BLD-SCNC: 99 MMOL/L (ref 99–110)
CO2: 20 MMOL/L (ref 21–32)
CO2: 21 MMOL/L (ref 21–32)
CREAT SERPL-MCNC: <0.5 MG/DL (ref 0.6–1.2)
CREAT SERPL-MCNC: <0.5 MG/DL (ref 0.6–1.2)
EOSINOPHILS ABSOLUTE: 0.3 K/UL (ref 0–0.6)
EOSINOPHILS RELATIVE PERCENT: 3.6 %
GFR AFRICAN AMERICAN: >60
GFR AFRICAN AMERICAN: >60
GFR NON-AFRICAN AMERICAN: >60
GFR NON-AFRICAN AMERICAN: >60
GLUCOSE BLD-MCNC: 76 MG/DL (ref 70–99)
GLUCOSE BLD-MCNC: 77 MG/DL (ref 70–99)
HCT VFR BLD CALC: 30.2 % (ref 36–48)
HCT VFR BLD CALC: 30.8 % (ref 36–48)
HEMOGLOBIN: 10 G/DL (ref 12–16)
HEMOGLOBIN: 9.9 G/DL (ref 12–16)
LYMPHOCYTES ABSOLUTE: 1.8 K/UL (ref 1–5.1)
LYMPHOCYTES RELATIVE PERCENT: 24.4 %
MAGNESIUM: 2 MG/DL (ref 1.8–2.4)
MAGNESIUM: 2 MG/DL (ref 1.8–2.4)
MCH RBC QN AUTO: 28.9 PG (ref 26–34)
MCHC RBC AUTO-ENTMCNC: 32.6 G/DL (ref 31–36)
MCV RBC AUTO: 88.7 FL (ref 80–100)
MONOCYTES ABSOLUTE: 0.5 K/UL (ref 0–1.3)
MONOCYTES RELATIVE PERCENT: 7.1 %
NEUTROPHILS ABSOLUTE: 4.7 K/UL (ref 1.7–7.7)
NEUTROPHILS RELATIVE PERCENT: 64.4 %
PDW BLD-RTO: 15.3 % (ref 12.4–15.4)
PLATELET # BLD: 272 K/UL (ref 135–450)
PMV BLD AUTO: 6.5 FL (ref 5–10.5)
POTASSIUM REFLEX MAGNESIUM: 3.2 MMOL/L (ref 3.5–5.1)
POTASSIUM REFLEX MAGNESIUM: 3.3 MMOL/L (ref 3.5–5.1)
RBC # BLD: 3.47 M/UL (ref 4–5.2)
SODIUM BLD-SCNC: 134 MMOL/L (ref 136–145)
SODIUM BLD-SCNC: 135 MMOL/L (ref 136–145)
WBC # BLD: 7.3 K/UL (ref 4–11)

## 2021-03-01 PROCEDURE — APPNB30 APP NON BILLABLE TIME 0-30 MINS: Performed by: PHYSICIAN ASSISTANT

## 2021-03-01 PROCEDURE — 1200000000 HC SEMI PRIVATE

## 2021-03-01 PROCEDURE — 80048 BASIC METABOLIC PNL TOTAL CA: CPT

## 2021-03-01 PROCEDURE — 6360000002 HC RX W HCPCS: Performed by: NURSE PRACTITIONER

## 2021-03-01 PROCEDURE — 2500000003 HC RX 250 WO HCPCS: Performed by: SURGERY

## 2021-03-01 PROCEDURE — 85018 HEMOGLOBIN: CPT

## 2021-03-01 PROCEDURE — 6370000000 HC RX 637 (ALT 250 FOR IP): Performed by: NURSE PRACTITIONER

## 2021-03-01 PROCEDURE — 83735 ASSAY OF MAGNESIUM: CPT

## 2021-03-01 PROCEDURE — 2580000003 HC RX 258: Performed by: HOSPITALIST

## 2021-03-01 PROCEDURE — 36415 COLL VENOUS BLD VENIPUNCTURE: CPT

## 2021-03-01 PROCEDURE — 2580000003 HC RX 258: Performed by: NURSE PRACTITIONER

## 2021-03-01 PROCEDURE — 85014 HEMATOCRIT: CPT

## 2021-03-01 PROCEDURE — 2580000003 HC RX 258: Performed by: ANESTHESIOLOGY

## 2021-03-01 PROCEDURE — 85025 COMPLETE CBC W/AUTO DIFF WBC: CPT

## 2021-03-01 PROCEDURE — APPSS15 APP SPLIT SHARED TIME 0-15 MINUTES: Performed by: PHYSICIAN ASSISTANT

## 2021-03-01 PROCEDURE — 94760 N-INVAS EAR/PLS OXIMETRY 1: CPT

## 2021-03-01 RX ORDER — POTASSIUM CHLORIDE 20 MEQ/1
40 TABLET, EXTENDED RELEASE ORAL PRN
Status: DISCONTINUED | OUTPATIENT
Start: 2021-03-01 | End: 2021-03-05 | Stop reason: HOSPADM

## 2021-03-01 RX ORDER — PANTOPRAZOLE SODIUM 40 MG/1
40 TABLET, DELAYED RELEASE ORAL
Qty: 60 TABLET | Refills: 0 | Status: SHIPPED | OUTPATIENT
Start: 2021-03-01 | End: 2021-03-31

## 2021-03-01 RX ORDER — POTASSIUM CHLORIDE 7.45 MG/ML
10 INJECTION INTRAVENOUS PRN
Status: DISCONTINUED | OUTPATIENT
Start: 2021-03-01 | End: 2021-03-05 | Stop reason: HOSPADM

## 2021-03-01 RX ADMIN — SERTRALINE HYDROCHLORIDE 50 MG: 50 TABLET ORAL at 08:31

## 2021-03-01 RX ADMIN — CARBIDOPA AND LEVODOPA 1 TABLET: 25; 100 TABLET ORAL at 06:59

## 2021-03-01 RX ADMIN — Medication 1 CAPSULE: at 17:58

## 2021-03-01 RX ADMIN — CARBIDOPA AND LEVODOPA 1 TABLET: 25; 100 TABLET ORAL at 12:16

## 2021-03-01 RX ADMIN — CARBIDOPA AND LEVODOPA 1 TABLET: 25; 100 TABLET ORAL at 17:58

## 2021-03-01 RX ADMIN — RIVASTIGMINE TARTRATE 1.5 MG: 1.5 CAPSULE ORAL at 21:50

## 2021-03-01 RX ADMIN — MIRTAZAPINE 7.5 MG: 15 TABLET, FILM COATED ORAL at 21:28

## 2021-03-01 RX ADMIN — SODIUM CHLORIDE, PRESERVATIVE FREE 10 ML: 5 INJECTION INTRAVENOUS at 08:35

## 2021-03-01 RX ADMIN — Medication 1 CAPSULE: at 08:31

## 2021-03-01 RX ADMIN — LISINOPRIL 5 MG: 5 TABLET ORAL at 08:32

## 2021-03-01 RX ADMIN — CARBIDOPA AND LEVODOPA 1 TABLET: 25; 100 TABLET ORAL at 08:31

## 2021-03-01 RX ADMIN — SODIUM CHLORIDE: 9 INJECTION, SOLUTION INTRAVENOUS at 03:50

## 2021-03-01 RX ADMIN — CEFEPIME HYDROCHLORIDE 2000 MG: 2 INJECTION, POWDER, FOR SOLUTION INTRAVENOUS at 03:50

## 2021-03-01 RX ADMIN — RIVASTIGMINE TARTRATE 1.5 MG: 1.5 CAPSULE ORAL at 08:32

## 2021-03-01 RX ADMIN — CARBIDOPA AND LEVODOPA 1 TABLET: 25; 100 TABLET ORAL at 16:01

## 2021-03-01 RX ADMIN — PRAVASTATIN SODIUM 20 MG: 20 TABLET ORAL at 21:28

## 2021-03-01 RX ADMIN — ESOMEPRAZOLE SODIUM 40 MG: 40 INJECTION, POWDER, LYOPHILIZED, FOR SOLUTION INTRAVENOUS at 06:59

## 2021-03-01 RX ADMIN — VERAPAMIL HYDROCHLORIDE 360 MG: 240 TABLET, FILM COATED, EXTENDED RELEASE ORAL at 08:31

## 2021-03-01 RX ADMIN — CARBIDOPA AND LEVODOPA 1 TABLET: 50; 200 TABLET, EXTENDED RELEASE ORAL at 21:49

## 2021-03-01 RX ADMIN — CEFEPIME HYDROCHLORIDE 2000 MG: 2 INJECTION, POWDER, FOR SOLUTION INTRAVENOUS at 16:01

## 2021-03-01 RX ADMIN — POTASSIUM CHLORIDE 40 MEQ: 1500 TABLET, EXTENDED RELEASE ORAL at 12:16

## 2021-03-01 NOTE — PROGRESS NOTES
General and Vascular Surgery                                                           Daily Progress Note                                                                  Pt Name: Dawood Johnson  Medical Record Number: [id number]  Date of Birth 1935   Today's Date: 3/1/2021      ASSESSMENT/PLAN  SBO vs ileus 2/2 UTI  -passing flatus, +BM this AM, Suppository yesterday  -abdomen mildly distended. nontender  -continue NG  -suppository  -continue merrem for pseudomonas UTI  -UGI bleed - gastritis on EGD  -H/H stable. Change PPI gtt to IV    SUBJECTIVE  Tala Furnish is unchanged from yesterday. Pain is well controlled. Minimal flatus, 1 BM    OBJECTIVE  VITALS:  height is 4' 11\" (1.499 m) and weight is 143 lb 8.3 oz (65.1 kg). Her oral temperature is 98.2 °F (36.8 °C). Her blood pressure is 171/78 (abnormal) and her pulse is 75. Her respiration is 16 and oxygen saturation is 95%. VITALS:  BP (!) 171/78   Pulse 75   Temp 98.2 °F (36.8 °C) (Oral)   Resp 16   Ht 4' 11\" (1.499 m)   Wt 143 lb 8.3 oz (65.1 kg)   SpO2 95%   BMI 28.99 kg/m²   GENERAL: alert, no distress  ABDOMEN: soft with mod distention. nontender  I/O last 3 completed shifts: In: 156 [P.O.:120; I.V.:700; IV Piggyback:50]  Out: 4000 [Urine:3750; Emesis/NG output:250]  No intake/output data recorded.     LABS  Recent Labs     03/01/21  0653   WBC 7.3   HGB 10.0*  9.9*   HCT 30.8*  30.2*      *   K 3.2*      CO2 20*   BUN 7   CREATININE <0.5*   MG 2.00   CALCIUM 7.9*     CBC:   Lab Results   Component Value Date    WBC 7.3 03/01/2021    RBC 3.47 03/01/2021    HGB 9.9 03/01/2021    HGB 10.0 03/01/2021    HCT 30.2 03/01/2021    HCT 30.8 03/01/2021    MCV 88.7 03/01/2021    MCH 28.9 03/01/2021    MCHC 32.6 03/01/2021    RDW 15.3 03/01/2021     03/01/2021    MPV 6.5 03/01/2021     CMP:    Lab Results   Component Value Date     03/01/2021    K 3.2 03/01/2021    CL

## 2021-03-01 NOTE — PROGRESS NOTES
Pt in bed, lying right side. Vitals, shift assessment and medications completed. Pt alert and oriented x3 (forgetful). Pt remains in bed, in lowest position, call light within reach and bed alarm on. NG tube intact and clamped after medications. Pt expresses no further needs at this time.

## 2021-03-01 NOTE — PROGRESS NOTES
Hospital Medicine Progress Note      Admit Date: 2/26/2021       CC: F/U for SBO, UTI    HPI: The patient is a 82 yrs old female, who  has a past medical history of Contraindication to anticoagulation therapy, Hiatal hernia, High blood pressure, Neurogenic bladder, Parkinson disease ([address] [address] pulmonary embolism, and Suprapubic catheter ([address] The patient had recently been admitted for R upper and lower lobe PE (12/31). She was initially treated with heparin gtt and was transitioned to PO eliquis. Initially there was some concern for [facility] given that she is a fall risk and has an IVC filter. Ultimately she was discharged on eliquis. At the time, US of BLE was negative for DVT. She presented to [facility] ER on the date of admission with complaints of abd pain, nausea, vomiting, and coffee ground emesis. Furthermore - she was also constipated. CT A/P showed a small bowel obstruction with transition point in the RLQ of the abdomen, a suspected thrombus in the R femoral vein, bilateral inguinal hernias and a hiatal hernia. H&H was 11.8 and 35.2 at the time of presentation - which was close to her baseline. She was admitted for further workup and treatment of SBO. NGT was inserted. PPI Gtt was initiated. AC was held. H&H was monitored. GI was consulted. The patient underwent EGD on 2/26/21, which showed GI bleeding from gastritis. General surgery was consulted but SBO was managed conservatively with NGT and bowel rest. The patient had been started on IV rocephin for presumed UTI. UC resulted positive for pseudomonas. Abx were adjusted to cefepime for pseudomonal coverage. Interval History/Subjective: replace K today. Cont NG tube per Gen Surg. States she is keeping water and ice down. No abd pain. No further vomiting. Review of Systems:       The patient denied headaches, visual changes, LOC, SOB, CP, ABD pain, N/V/D, skin changes, new or worsening weakness or neuromuscular deficits.     Comprehensive ROS negative except as mentioned above. Past Medical History:        Diagnosis Date    Contraindication to anticoagulation therapy     fall risk    Hiatal hernia     High blood pressure     Neurogenic bladder     Parkinson disease ([facility] [address]     pulmonary embolism 12/13/2020    right upper and lower lobes    Suprapubic catheter [facility])        Past Surgical History:        Procedure Laterality Date    ABDOMEN SURGERY      bowel obstruction    APPENDECTOMY  1948    CYSTOSCOPY N/A 12/03/2020    CYSTOSCOPY,  WITH INTRAVESICAL INJECTION OF BOTOX 200UNITS, WITH SUPRAPUBIC TUBE EXCHANGE performed by Greer Kline MD at [address] [facility]    IVC FILTER INSERTION Right 12/17/2020    Dr. Dewey Wright. [place] retreivable IVC filter    TONSILLECTOMY AND ADENOIDECTOMY  1943    UPPER GASTROINTESTINAL ENDOSCOPY N/A 2/26/2021    EGD BIOPSY performed by Lance Gonzalez MD at [facility]  2006    repair vagina bladder and rectum       Allergies:  Caduet [amlodipine-atorvastatin], Estrogens conjugated, Penicillins, and Terramycin [oxytetracycline-lidocaine]    Past medical and surgical history reviewed. Any changes have been noted. PHYSICAL EXAM:  BP (!) 171/78   Pulse 75   Temp 98.2 °F (36.8 °C) (Oral)   Resp 16   Ht 4' 11\" (1.499 m)   Wt 143 lb 8.3 oz (65.1 kg)   SpO2 95%   BMI 28.99 kg/m²       Intake/Output Summary (Last 24 hours) at 3/1/2021 1021  Last data filed at 3/1/2021 0700  Gross per 24 hour   Intake 870 ml   Output 3100 ml   Net -2230 ml        General appearance:   No apparent distress, appears stated age. Cooperative. HEENT:  NG tube in place Normocephalic, atraumatic. PERRLA. EOMi. Conjunctivae/corneas clear, no icterus, non-injected. Neck: Supple, with full range of motion. No jugular venous distention. Trachea midline. Respiratory:  Normal respiratory effort.  Clear to auscultation, bilaterally without Rales/Wheezes/Rhonchi. Cardiovascular:  Regular rate and rhythm without murmurs, rubs or gallops. Abdomen: Soft, non-tender, non-distended, without rebound or guarding. Normal bowel sounds. Musculoskeletal:  No clubbing, cyanosis or edema bilaterally. Full range of motion without deformity. : camp catheter with yellow urine  Skin: Skin color, texture, turgor normal.  No rashes or lesions. Neurologic:  Neurovascularly intact without any focal sensory/motor deficits. Cranial nerves: II-XII intact, grossly intact. No facial asymmetry, tongue midline. Psychiatric:  Alert and oriented, thought content appropriate  Capillary Refill: Brisk,< 3 seconds   Peripheral Pulses: +2 palpable, equal bilaterally       LABS:    Lab Results   Component Value Date    WBC 7.3 03/01/2021    HGB 9.9 (L) 03/01/2021    HGB 10.0 (L) 03/01/2021    HCT 30.2 (L) 03/01/2021    HCT 30.8 (L) 03/01/2021    MCV 88.7 03/01/2021     03/01/2021    LYMPHOPCT 24.4 03/01/2021    RBC 3.47 (L) 03/01/2021    MCH 28.9 03/01/2021    MCHC 32.6 03/01/2021    RDW 15.3 03/01/2021       Lab Results   Component Value Date    CREATININE <0.5 (L) 03/01/2021    BUN 7 03/01/2021     (L) 03/01/2021    K 3.2 (L) 03/01/2021     03/01/2021    CO2 20 (L) 03/01/2021       Lab Results   Component Value Date    MG 2.00 03/01/2021       Lab Results   Component Value Date    ALT 17 02/26/2021    AST 19 02/26/2021    ALKPHOS 102 02/26/2021    BILITOT <0.2 02/26/2021        No flowsheet data found. No results found for: LABA1C    Imaging:  XR ABDOMEN (KUB) (SINGLE AP VIEW)   Final Result   NG tube tip and proximal side-port reside in the stomach. There are   gas-filled loops of large and small bowel suggesting an ileus. The distal   small bowel obstruction noted on CT from 1 day earlier is not perceived on   the conventional radiographs. CT ABDOMEN PELVIS W IV CONTRAST Additional Contrast? None   Final Result   1.  Small bowel obstruction with a transition point in the right lower   quadrant of the abdomen. 2. Gallstones without adjacent inflammatory changes. 3. Hiatal hernia. 4. Trace right pleural effusion. 5. Suspected thrombus in the right femoral vein. 6. Bilateral inguinal hernias. 7. Suprapubic catheter in the bladder lumen. XR CHEST PORTABLE   Final Result   Low lung volumes with bibasilar atelectasis, left side greater than right. There may be trace bilateral pleural effusions. Scheduled and prn Medications:    Scheduled Meds:   esomeprazole  40 mg Intravenous QAM AC    mirtazapine  7.5 mg Oral Nightly    cefepime  2,000 mg Intravenous Q12H    lisinopril  5 mg Oral Daily    pravastatin  20 mg Oral Nightly    sertraline  50 mg Oral Daily    lactobacillus  1 capsule Oral BID WC    carbidopa-levodopa  1 tablet Oral 5x Daily    verapamil  360 mg Oral QAM    rivastigmine  1.5 mg Oral BID    carbidopa-levodopa  1 tablet Oral Nightly    sodium chloride flush  10 mL Intravenous 2 times per day    sodium chloride flush  10 mL Intravenous 2 times per day    sodium chloride flush  10 mL Intravenous 2 times per day     Continuous Infusions:   sodium chloride 100 mL/hr at 03/01/21 0350     PRN Meds:.polyvinyl alcohol-povidone, sodium chloride flush, acetaminophen **OR** acetaminophen, sodium chloride flush, sodium chloride flush, phenol, promethazine **OR** ondansetron    Assessment & Plan:        Small Bowel Obstruction vs. Ileus 2/2 UTI  IV fluids  NG tube to LWS. Diet NPO Effective Now  PRN analgesia  No peritoneal signs  IV push for PPI - stop infusion  Treating UTI with IV antibx  General Surgery following. GI consulted.      Acute GI Bleed  Oral anticoagulation - chronically + filter in place   Transfuse as needed to maintain hgb > 7  Serial h & h  PPI gtt  IVF  GI following. Appreciate recs.   - s/p EGD 2/26/21  - Bleeding is likely coming from gastritis with oozing   Currently holding eliquis/ASA - awaiting restart per Gen Surg/GI  (likely when H/H stabilizes)   - cont bid pantoprazole x1 month after discharge per GI     Urinary Tract Infection, POA - due to pseudomonas   - Likely 2/2 chronic indwelling urinary catheter  UA +. UC positive for pseudomonas. Cefepime day 2/3  Sensitivities reviewed.     Recent PE (12/20)   Had IVC filter placed at that time due to concerns of fall risk - though was started on eliquis. Suspected R  Femoral thrombus on CT 2/26, however US doppler was negative 84/33   - Complication of IVC placement? Currently holding eliquis due to GIB     HTN  Continue medications as ordered  Monitor BP  Titrate medications as needed.     HLD  Statin  Monitor for S/S of Rhabdomyolysis      Parkinson's disease  Takes sinemet at home   Home meds reviewed and ordered.      Neurogenic bladder  Has chronic indwelling suprapubic catheter. Catheter should be changed every 30 days. Continue current regimen/therapies. Monitor. Adjust medical regimen as appropriate. Body mass index is 28.99 kg/m². The patient and / or the family were informed of the results of any tests, a time was given to answer questions, a plan was proposed and they agreed with plan.       DVT ppx: SCDs - restart oral anticoag when H/H stable  GI ppx: nexium (protonix oral bid x30 days on d/c per GI)    Diet: Diet NPO Effective Now    Consults:  IP CONSULT TO HOSPITALIST  IP CONSULT TO GENERAL SURGERY  IP CONSULT TO GENERAL SURGERY  IP CONSULT TO GI  IP CONSULT TO SPIRITUAL SERVICES    DISPO/placement plan: pending    Code Status: DNR-CCA      WILMAR Stone - CNP  03/01/21

## 2021-03-01 NOTE — PLAN OF CARE
Problem: SAFETY  Goal: Free from accidental physical injury  Outcome: Ongoing  Goal: Free from intentional harm  Outcome: Ongoing     Problem: DAILY CARE  Goal: Daily care needs are met  Outcome: Ongoing     Problem: PAIN  Goal: Patient's pain/discomfort is manageable  Outcome: Ongoing     Problem: SKIN INTEGRITY  Goal: Skin integrity is maintained or improved  Outcome: Ongoing     Problem: KNOWLEDGE DEFICIT  Goal: Patient/S.O. demonstrates understanding of disease process, treatment plan, medications, and discharge instructions.   Outcome: Ongoing     Problem: DISCHARGE BARRIERS  Goal: Patient's continuum of care needs are met  Outcome: Ongoing     Problem: Falls - Risk of:  Goal: Will remain free from falls  Description: Will remain free from falls  Outcome: Ongoing  Goal: Absence of physical injury  Description: Absence of physical injury  Outcome: Ongoing     Problem: Skin Integrity:  Goal: Will show no infection signs and symptoms  Description: Will show no infection signs and symptoms  Outcome: Ongoing  Goal: Absence of new skin breakdown  Description: Absence of new skin breakdown  Outcome: Ongoing

## 2021-03-02 LAB
ANION GAP SERPL CALCULATED.3IONS-SCNC: 13 MMOL/L (ref 3–16)
BASOPHILS ABSOLUTE: 0 K/UL (ref 0–0.2)
BASOPHILS RELATIVE PERCENT: 0.6 %
BUN BLDV-MCNC: 6 MG/DL (ref 7–20)
CALCIUM SERPL-MCNC: 8.2 MG/DL (ref 8.3–10.6)
CHLORIDE BLD-SCNC: 103 MMOL/L (ref 99–110)
CO2: 22 MMOL/L (ref 21–32)
CREAT SERPL-MCNC: <0.5 MG/DL (ref 0.6–1.2)
EOSINOPHILS ABSOLUTE: 0.2 K/UL (ref 0–0.6)
EOSINOPHILS RELATIVE PERCENT: 3.1 %
GFR AFRICAN AMERICAN: >60
GFR NON-AFRICAN AMERICAN: >60
GLUCOSE BLD-MCNC: 92 MG/DL (ref 70–99)
HCT VFR BLD CALC: 28.4 % (ref 36–48)
HEMOGLOBIN: 9.3 G/DL (ref 12–16)
LYMPHOCYTES ABSOLUTE: 1.5 K/UL (ref 1–5.1)
LYMPHOCYTES RELATIVE PERCENT: 19.3 %
MAGNESIUM: 2.1 MG/DL (ref 1.8–2.4)
MCH RBC QN AUTO: 28.4 PG (ref 26–34)
MCHC RBC AUTO-ENTMCNC: 32.7 G/DL (ref 31–36)
MCV RBC AUTO: 86.8 FL (ref 80–100)
MONOCYTES ABSOLUTE: 0.5 K/UL (ref 0–1.3)
MONOCYTES RELATIVE PERCENT: 6.5 %
NEUTROPHILS ABSOLUTE: 5.4 K/UL (ref 1.7–7.7)
NEUTROPHILS RELATIVE PERCENT: 70.5 %
PDW BLD-RTO: 15.4 % (ref 12.4–15.4)
PLATELET # BLD: 304 K/UL (ref 135–450)
PMV BLD AUTO: 6.8 FL (ref 5–10.5)
POTASSIUM REFLEX MAGNESIUM: 3.5 MMOL/L (ref 3.5–5.1)
RBC # BLD: 3.27 M/UL (ref 4–5.2)
SODIUM BLD-SCNC: 138 MMOL/L (ref 136–145)
WBC # BLD: 7.6 K/UL (ref 4–11)

## 2021-03-02 PROCEDURE — 36415 COLL VENOUS BLD VENIPUNCTURE: CPT

## 2021-03-02 PROCEDURE — 2580000003 HC RX 258: Performed by: NURSE PRACTITIONER

## 2021-03-02 PROCEDURE — 6370000000 HC RX 637 (ALT 250 FOR IP): Performed by: NURSE PRACTITIONER

## 2021-03-02 PROCEDURE — APPNB30 APP NON BILLABLE TIME 0-30 MINS: Performed by: PHYSICIAN ASSISTANT

## 2021-03-02 PROCEDURE — 85025 COMPLETE CBC W/AUTO DIFF WBC: CPT

## 2021-03-02 PROCEDURE — 1200000000 HC SEMI PRIVATE

## 2021-03-02 PROCEDURE — 2500000003 HC RX 250 WO HCPCS: Performed by: SURGERY

## 2021-03-02 PROCEDURE — 80048 BASIC METABOLIC PNL TOTAL CA: CPT

## 2021-03-02 PROCEDURE — APPSS15 APP SPLIT SHARED TIME 0-15 MINUTES: Performed by: PHYSICIAN ASSISTANT

## 2021-03-02 PROCEDURE — 99232 SBSQ HOSP IP/OBS MODERATE 35: CPT | Performed by: SURGERY

## 2021-03-02 PROCEDURE — 2580000003 HC RX 258: Performed by: HOSPITALIST

## 2021-03-02 PROCEDURE — 83735 ASSAY OF MAGNESIUM: CPT

## 2021-03-02 PROCEDURE — 6360000002 HC RX W HCPCS: Performed by: NURSE PRACTITIONER

## 2021-03-02 PROCEDURE — 94761 N-INVAS EAR/PLS OXIMETRY MLT: CPT

## 2021-03-02 RX ORDER — POLYETHYLENE GLYCOL 3350 17 G/17G
17 POWDER, FOR SOLUTION ORAL DAILY
Status: DISCONTINUED | OUTPATIENT
Start: 2021-03-02 | End: 2021-03-05 | Stop reason: HOSPADM

## 2021-03-02 RX ADMIN — MIRTAZAPINE 7.5 MG: 15 TABLET, FILM COATED ORAL at 20:17

## 2021-03-02 RX ADMIN — VERAPAMIL HYDROCHLORIDE 360 MG: 240 TABLET, FILM COATED, EXTENDED RELEASE ORAL at 09:17

## 2021-03-02 RX ADMIN — POLYETHYLENE GLYCOL 3350 17 G: 17 POWDER, FOR SOLUTION ORAL at 12:30

## 2021-03-02 RX ADMIN — CEFEPIME HYDROCHLORIDE 2000 MG: 2 INJECTION, POWDER, FOR SOLUTION INTRAVENOUS at 15:27

## 2021-03-02 RX ADMIN — SERTRALINE HYDROCHLORIDE 50 MG: 50 TABLET ORAL at 09:17

## 2021-03-02 RX ADMIN — Medication 1 CAPSULE: at 18:32

## 2021-03-02 RX ADMIN — APIXABAN 5 MG: 5 TABLET, FILM COATED ORAL at 12:29

## 2021-03-02 RX ADMIN — LISINOPRIL 5 MG: 5 TABLET ORAL at 09:16

## 2021-03-02 RX ADMIN — CARBIDOPA AND LEVODOPA 1 TABLET: 25; 100 TABLET ORAL at 09:17

## 2021-03-02 RX ADMIN — RIVASTIGMINE TARTRATE 1.5 MG: 1.5 CAPSULE ORAL at 20:17

## 2021-03-02 RX ADMIN — CARBIDOPA AND LEVODOPA 1 TABLET: 50; 200 TABLET, EXTENDED RELEASE ORAL at 20:17

## 2021-03-02 RX ADMIN — CARBIDOPA AND LEVODOPA 1 TABLET: 25; 100 TABLET ORAL at 05:39

## 2021-03-02 RX ADMIN — CEFEPIME HYDROCHLORIDE 2000 MG: 2 INJECTION, POWDER, FOR SOLUTION INTRAVENOUS at 03:37

## 2021-03-02 RX ADMIN — SODIUM CHLORIDE: 9 INJECTION, SOLUTION INTRAVENOUS at 07:15

## 2021-03-02 RX ADMIN — PRAVASTATIN SODIUM 20 MG: 20 TABLET ORAL at 20:17

## 2021-03-02 RX ADMIN — CARBIDOPA AND LEVODOPA 1 TABLET: 25; 100 TABLET ORAL at 15:26

## 2021-03-02 RX ADMIN — CARBIDOPA AND LEVODOPA 1 TABLET: 25; 100 TABLET ORAL at 12:29

## 2021-03-02 RX ADMIN — CARBIDOPA AND LEVODOPA 1 TABLET: 25; 100 TABLET ORAL at 18:33

## 2021-03-02 RX ADMIN — RIVASTIGMINE TARTRATE 1.5 MG: 1.5 CAPSULE ORAL at 09:21

## 2021-03-02 RX ADMIN — SODIUM CHLORIDE: 9 INJECTION, SOLUTION INTRAVENOUS at 23:06

## 2021-03-02 RX ADMIN — ESOMEPRAZOLE SODIUM 40 MG: 40 INJECTION, POWDER, LYOPHILIZED, FOR SOLUTION INTRAVENOUS at 05:40

## 2021-03-02 RX ADMIN — GLYCERIN 2 G: 2 SUPPOSITORY RECTAL at 18:33

## 2021-03-02 RX ADMIN — APIXABAN 5 MG: 5 TABLET, FILM COATED ORAL at 20:17

## 2021-03-02 RX ADMIN — Medication 1 CAPSULE: at 09:17

## 2021-03-02 ASSESSMENT — PAIN SCALES - GENERAL: PAINLEVEL_OUTOF10: 0

## 2021-03-02 NOTE — PROGRESS NOTES
Removed pt NG tube. Pt tolerated well. Upgraded to clear liquid diet. Sips given. Meds given in applesauce. Will continue to monitor.  Electronically signed by Madhavi Medina RN on 3/2/2021 at 12:36 PM

## 2021-03-02 NOTE — PROGRESS NOTES
Hospital Medicine Progress Note      Admit Date: 2/26/2021       CC: F/U for SBO, UTI    HPI: The patient is a 82 yrs old female, who  has a past medical history of Contraindication to anticoagulation therapy, Hiatal hernia, High blood pressure, Neurogenic bladder, Parkinson disease ([address] pulmonary embolism, and Suprapubic catheter ([address]  The patient had recently been admitted for R upper and lower lobe PE (12/31). She was initially treated with heparin gtt and was transitioned to PO eliquis. Initially there was some concern for [facility] given that she is a fall risk and has an IVC filter. Ultimately she was discharged on eliquis. At the time, US of BLE was negative for DVT. She presented to [facility] ER on the date of admission with complaints of abd pain, nausea, vomiting, and coffee ground emesis. Furthermore - she was also constipated. CT A/P showed a small bowel obstruction with transition point in the RLQ of the abdomen, a suspected thrombus in the R femoral vein, bilateral inguinal hernias and a hiatal hernia. H&H was 11.8 and 35.2 at the time of presentation - which was close to her baseline. She was admitted for further workup and treatment of SBO. NGT was inserted. PPI Gtt was initiated. AC was held. H&H was monitored. GI was consulted. The patient underwent EGD on 2/26/21, which showed GI bleeding from gastritis. General surgery was consulted but SBO was managed conservatively with NGT and bowel rest. The patient had been started on IV rocephin for presumed UTI. UC resulted positive for pseudomonas. Abx were adjusted to cefepime for pseudomonal coverage.      3/1: replace K today. Cont NG tube per Gen Surg. States she is keeping water and ice down. No abd pain. No further vomiting. Interval History/Subjective: still waiting to have BM. Can do suppos and start her miralax. Ok to remove NG tube. Cont cefepime for pseudomonas UTI.      Review of Systems:     The patient denied headaches, visual changes, LOC, SOB, CP, ABD pain, N/V/D, skin changes, new or worsening weakness or neuromuscular deficits. Comprehensive ROS negative except as mentioned above. Past Medical History:        Diagnosis Date    Contraindication to anticoagulation therapy     fall risk    Hiatal hernia     High blood pressure     Neurogenic bladder     Parkinson disease ([address]     pulmonary embolism 12/13/2020    right upper and lower lobes    Suprapubic catheter [facility])        Past Surgical History:        Procedure Laterality Date    ABDOMEN SURGERY      bowel obstruction    APPENDECTOMY  1948    CYSTOSCOPY N/A 12/03/2020    CYSTOSCOPY,  WITH INTRAVESICAL INJECTION OF BOTOX 200UNITS, WITH SUPRAPUBIC TUBE EXCHANGE performed by Tarsha Santiago MD at [address] [facility]    IVC FILTER INSERTION Right 12/17/2020    Dr. Gerarod Simms. Tripp retreivable IVC filter    TONSILLECTOMY AND ADENOIDECTOMY  1943    UPPER GASTROINTESTINAL ENDOSCOPY N/A 2/26/2021    EGD BIOPSY performed by Ally Rivero MD at [place] [address]    repair vagina bladder and rectum       Allergies:  Caduet [amlodipine-atorvastatin], Estrogens conjugated, Penicillins, and Terramycin [oxytetracycline-lidocaine]    Past medical and surgical history reviewed. Any changes have been noted. PHYSICAL EXAM:  BP (!) 168/76   Pulse 80   Temp 98.3 °F (36.8 °C) (Oral)   Resp 18   Ht 4' 11\" (1.499 m)   Wt 147 lb 11.3 oz (67 kg)   SpO2 94%   BMI 29.83 kg/m²       Intake/Output Summary (Last 24 hours) at 3/2/2021 1150  Last data filed at 3/2/2021 0415  Gross per 24 hour   Intake 30 ml   Output 1875 ml   Net -1845 ml        General appearance:   No apparent distress, appears stated age. Cooperative. HEENT: NG tube to LWS with no drainage;  Normocephalic, atraumatic. PERRLA. EOMi. Conjunctivae/corneas clear, no icterus, non-injected. Neck: Supple, with full range of motion.  No jugular venous distention. Trachea midline. Respiratory:  Normal respiratory effort. Clear to auscultation, bilaterally without Rales/Wheezes/Rhonchi. Cardiovascular:  Regular rate and rhythm without murmurs, rubs or gallops. Abdomen: Soft, tenderness RLQ minimally on palpation; non-distended, without rebound or guarding. Normal bowel sounds. Musculoskeletal:  No clubbing, cyanosis or edema bilaterally. Full range of motion without deformity. Skin: Skin color, texture, turgor normal.  No rashes or lesions. Neurologic:  Neurovascularly intact without any focal sensory/motor deficits. Cranial nerves: II-XII intact, grossly intact. No facial asymmetry, tongue midline. Psychiatric:  Alert and oriented, thought content appropriate  Capillary Refill: Brisk,< 3 seconds   Peripheral Pulses: +2 palpable, equal bilaterally       LABS:    Lab Results   Component Value Date    WBC 7.6 03/02/2021    HGB 9.3 (L) 03/02/2021    HCT 28.4 (L) 03/02/2021    MCV 86.8 03/02/2021     03/02/2021    LYMPHOPCT 19.3 03/02/2021    RBC 3.27 (L) 03/02/2021    MCH 28.4 03/02/2021    MCHC 32.7 03/02/2021    RDW 15.4 03/02/2021       Lab Results   Component Value Date    CREATININE <0.5 (L) 03/02/2021    BUN 6 (L) 03/02/2021     03/02/2021    K 3.5 03/02/2021     03/02/2021    CO2 22 03/02/2021       Lab Results   Component Value Date    MG 2.10 03/02/2021       Lab Results   Component Value Date    ALT 17 02/26/2021    AST 19 02/26/2021    ALKPHOS 102 02/26/2021    BILITOT <0.2 02/26/2021        No flowsheet data found. No results found for: LABA1C    Imaging:  XR ABDOMEN (KUB) (SINGLE AP VIEW)   Final Result   NG tube tip and proximal side-port reside in the stomach. There are   gas-filled loops of large and small bowel suggesting an ileus. The distal   small bowel obstruction noted on CT from 1 day earlier is not perceived on   the conventional radiographs.          CT ABDOMEN PELVIS W IV CONTRAST Additional Contrast? None   Final Result   1. Small bowel obstruction with a transition point in the right lower   quadrant of the abdomen. 2. Gallstones without adjacent inflammatory changes. 3. Hiatal hernia. 4. Trace right pleural effusion. 5. Suspected thrombus in the right femoral vein. 6. Bilateral inguinal hernias. 7. Suprapubic catheter in the bladder lumen. XR CHEST PORTABLE   Final Result   Low lung volumes with bibasilar atelectasis, left side greater than right. There may be trace bilateral pleural effusions. Scheduled and prn Medications:    Scheduled Meds:   glycerin (ADULT)  1 suppository Rectal Once    polyethylene glycol  17 g Oral Daily    esomeprazole  40 mg Intravenous QAM AC    mirtazapine  7.5 mg Oral Nightly    cefepime  2,000 mg Intravenous Q12H    lisinopril  5 mg Oral Daily    pravastatin  20 mg Oral Nightly    sertraline  50 mg Oral Daily    lactobacillus  1 capsule Oral BID WC    carbidopa-levodopa  1 tablet Oral 5x Daily    verapamil  360 mg Oral QAM    rivastigmine  1.5 mg Oral BID    carbidopa-levodopa  1 tablet Oral Nightly    sodium chloride flush  10 mL Intravenous 2 times per day    sodium chloride flush  10 mL Intravenous 2 times per day    sodium chloride flush  10 mL Intravenous 2 times per day     Continuous Infusions:   sodium chloride 100 mL/hr at 03/02/21 0715     PRN Meds:.potassium chloride **OR** potassium alternative oral replacement **OR** potassium chloride, polyvinyl alcohol-povidone, sodium chloride flush, acetaminophen **OR** acetaminophen, sodium chloride flush, sodium chloride flush, phenol, promethazine **OR** ondansetron    Assessment & Plan:        Small Bowel Obstruction vs. Ileus 2/2 UTI  IV fluids  NG tube to LWS.   Diet NPO Effective Now  PRN analgesia  No peritoneal signs  IV push for PPI - stop infusion  Treating UTI with IV antibx  General Surgery following.   GI consulted.      Acute GI Bleed  Oral anticoagulation - chronically + filter in place   Transfuse as needed to maintain hgb > 7  Serial h & h  PPI gtt  IVF  GI following. Appreciate recs. - s/p EGD 2/26/21  -  Gastric biopsies:  Acute gastritis with ulceration and congestion, suggestive of ischemic   gastritis. -  Negative for intestinal metaplasia, dysplasia or malignancy. - Bleeding is likely coming from gastritis with oozing   Currently holding eliquis/ASA - awaiting restart per Gen Surg/GI  (likely when H/H stabilizes)   - restart eliquis 3/2 since H/H stable  - cont bid pantoprazole x1 month after discharge per GI     Urinary Tract Infection, POA - due to pseudomonas   - Likely 2/2 chronic indwelling urinary catheter  UA +. UC positive for pseudomonas. Cefepime day 4  Sensitivities reviewed.     Recent PE (12/20)   Hx femoral vein thrombosis and PE  - eliquis chronically - restart now that h/h stable ok per GI  Had IVC filter placed at that time due to concerns of fall risk - though was started on eliquis. Suspected R  Femoral thrombus on CT 2/26, however US doppler was negative 49/21   - Complication of IVC placement? Currently holding eliquis due to GIB     HTN  Continue medications as ordered  Monitor BP  Titrate medications as needed.     HLD  Statin  Monitor for S/S of Rhabdomyolysis      Parkinson's disease  Takes sinemet at home   Home meds reviewed and ordered.      Neurogenic bladder  Has chronic indwelling suprapubic catheter.   Catheter should be changed every 30 days    Continue current regimen/therapies. Monitor. Adjust medical regimen as appropriate. Body mass index is 29.83 kg/m². The patient and / or the family were informed of the results of any tests, a time was given to answer questions, a plan was proposed and they agreed with plan.       DVT ppx: SCDS for now in setting of poss oozing/GIB  GI ppx: nexium    Diet: DIET CLEAR LIQUID;    Consults:  IP CONSULT TO HOSPITALIST  IP CONSULT TO GENERAL SURGERY  IP CONSULT TO GENERAL SURGERY  IP CONSULT TO GI  IP CONSULT TO SPIRITUAL SERVICES    DISPO/placement plan: pending .  Maybe d/c back to [facility] if able in the next couple days if continues to improve    Code Status: DNR-CCA      WILMAR Jimenes - JOAN  03/02/21

## 2021-03-02 NOTE — PROGRESS NOTES
LABGLOM >60 03/02/2021    GLUCOSE 92 03/02/2021    PROT 7.2 02/26/2021    LABALBU 3.6 02/26/2021    CALCIUM 8.2 03/02/2021    BILITOT <0.2 02/26/2021    ALKPHOS 102 02/26/2021    AST 19 02/26/2021    ALT 17 02/26/2021           Electronically signed by Tommy Watts PA-C on 3/2/2021 at 11:18 AM    Attending    As per note above by Lane Hamilton  Patient was personally seen and examined by me today  Chart, labs and imaging reviewed    A/P  Ileus vs PSBO   Improving   NG out today   Start PO    Electronically signed by Don Miller MD on 3/2/2021 at 3:09 PM

## 2021-03-02 NOTE — PLAN OF CARE
Problem: SAFETY  Goal: Free from accidental physical injury  3/2/2021 0300 by Shanta Mark RN  Outcome: Ongoing     Problem: SAFETY  Goal: Free from intentional harm  3/2/2021 0300 by Shanta Mark RN  Outcome: Ongoing     Problem: DAILY CARE  Goal: Daily care needs are met  3/2/2021 0300 by Shanta Mark RN  Outcome: Ongoing     Problem: PAIN  Goal: Patient's pain/discomfort is manageable  3/2/2021 0300 by Shanta Mark RN  Outcome: Ongoing     Problem: SKIN INTEGRITY  Goal: Skin integrity is maintained or improved  3/2/2021 0300 by Shanta Mark RN  Outcome: Ongoing     Problem: KNOWLEDGE DEFICIT  Goal: Patient/S.O. demonstrates understanding of disease process, treatment plan, medications, and discharge instructions.   3/2/2021 0300 by Shanta Mark RN  Outcome: Ongoing     Problem: DISCHARGE BARRIERS  Goal: Patient's continuum of care needs are met  3/2/2021 0300 by Shanta Mark RN  Outcome: Ongoing     Problem: Falls - Risk of:  Goal: Will remain free from falls  Description: Will remain free from falls  3/2/2021 0300 by Shanta Mark RN  Outcome: Ongoing     Problem: Falls - Risk of:  Goal: Absence of physical injury  Description: Absence of physical injury  3/2/2021 0300 by Shanta Mark RN  Outcome: Ongoing     Problem: Skin Integrity:  Goal: Will show no infection signs and symptoms  Description: Will show no infection signs and symptoms  3/2/2021 0300 by Shanta aMrk RN  Outcome: Ongoing     Problem: Skin Integrity:  Goal: Absence of new skin breakdown  Description: Absence of new skin breakdown  3/2/2021 0300 by Shanta Mark RN  Outcome: Ongoing

## 2021-03-02 NOTE — PLAN OF CARE
Problem: SAFETY  Goal: Free from accidental physical injury  3/2/2021 1214 by Walt Goldsmith RN  Outcome: Ongoing  3/2/2021 0300 by Lona Greene RN  Outcome: Ongoing  Goal: Free from intentional harm  3/2/2021 1214 by Walt Goldsmith RN  Outcome: Ongoing  3/2/2021 0300 by Lona Greene RN  Outcome: Ongoing     Problem: DAILY CARE  Goal: Daily care needs are met  3/2/2021 1214 by Walt Goldsmith RN  Outcome: Ongoing  3/2/2021 0300 by Lona Greene RN  Outcome: Ongoing     Problem: PAIN  Goal: Patient's pain/discomfort is manageable  3/2/2021 1214 by Walt Goldsmith RN  Outcome: Ongoing  3/2/2021 0300 by Lona Greene RN  Outcome: Ongoing     Problem: SKIN INTEGRITY  Goal: Skin integrity is maintained or improved  3/2/2021 1214 by Walt Goldsmith RN  Outcome: Ongoing  3/2/2021 0300 by Lona Greene RN  Outcome: Ongoing     Problem: KNOWLEDGE DEFICIT  Goal: Patient/S.O. demonstrates understanding of disease process, treatment plan, medications, and discharge instructions.   3/2/2021 1214 by Walt Goldsmith RN  Outcome: Ongoing  3/2/2021 0300 by Lona Greene RN  Outcome: Ongoing     Problem: DISCHARGE BARRIERS  Goal: Patient's continuum of care needs are met  3/2/2021 1214 by Walt Goldsmith RN  Outcome: Ongoing  3/2/2021 0300 by Lona Greene RN  Outcome: Ongoing     Problem: Falls - Risk of:  Goal: Will remain free from falls  Description: Will remain free from falls  3/2/2021 1214 by Walt Goldsmith RN  Outcome: Ongoing  3/2/2021 0300 by Lona Greene RN  Outcome: Ongoing  Goal: Absence of physical injury  Description: Absence of physical injury  3/2/2021 1214 by Walt Goldsmith RN  Outcome: Ongoing  3/2/2021 0300 by Lona Greene RN  Outcome: Ongoing     Problem: Skin Integrity:  Goal: Will show no infection signs and symptoms  Description: Will show no infection signs and symptoms  3/2/2021 1214 by Walt Goldsmith RN  Outcome: Ongoing  3/2/2021 0300 by Lona Greene RN  Outcome: Ongoing  Goal: Absence of new

## 2021-03-02 NOTE — PROGRESS NOTES
4 Eyes Skin Assessment     NAME:  Heather Conway  YOB: 1935  MEDICAL RECORD NUMBER:  [id number]    The patient is being assess for  Transfer to New Unit    I agree that 2 RN's have performed a thorough Head to Toe Skin Assessment on the patient. ALL assessment sites listed below have been assessed. Areas assessed by both nurses:    Head, Face, Ears, Shoulders, Back, Chest, Arms, Elbows, Hands, Sacrum. Buttock, Coccyx, Ischium and Legs. Feet and Heels        Does the Patient have a Wound?  No noted wound(s)       Raheem Prevention initiated:  No   Wound Care Orders initiated:  No    Pressure Injury (Stage 3,4, Unstageable, DTI, NWPT, and Complex wounds) if present place consult order under [de-identified] No    New and Established Ostomies if present place consult order under : No      Nurse 1 eSignature: Electronically signed by Tobin Hector RN on 3/1/21 at 7:43 PM EST    **SHARE this note so that the co-signing nurse is able to place an eSignature**    Nurse 2 eSignature: Electronically signed by Memo Malone RN on 3/1/21 at 7:45 PM EST

## 2021-03-03 LAB
ANION GAP SERPL CALCULATED.3IONS-SCNC: 8 MMOL/L (ref 3–16)
BASOPHILS ABSOLUTE: 0.1 K/UL (ref 0–0.2)
BASOPHILS RELATIVE PERCENT: 1.6 %
BUN BLDV-MCNC: 4 MG/DL (ref 7–20)
CALCIUM SERPL-MCNC: 8.1 MG/DL (ref 8.3–10.6)
CHLORIDE BLD-SCNC: 106 MMOL/L (ref 99–110)
CO2: 24 MMOL/L (ref 21–32)
CREAT SERPL-MCNC: <0.5 MG/DL (ref 0.6–1.2)
EOSINOPHILS ABSOLUTE: 0.3 K/UL (ref 0–0.6)
EOSINOPHILS RELATIVE PERCENT: 4.5 %
GFR AFRICAN AMERICAN: >60
GFR NON-AFRICAN AMERICAN: >60
GLUCOSE BLD-MCNC: 134 MG/DL (ref 70–99)
HCT VFR BLD CALC: 27.2 % (ref 36–48)
HEMOGLOBIN: 9.1 G/DL (ref 12–16)
LYMPHOCYTES ABSOLUTE: 1.3 K/UL (ref 1–5.1)
LYMPHOCYTES RELATIVE PERCENT: 22.4 %
MAGNESIUM: 1.9 MG/DL (ref 1.8–2.4)
MCH RBC QN AUTO: 28.4 PG (ref 26–34)
MCHC RBC AUTO-ENTMCNC: 33.3 G/DL (ref 31–36)
MCV RBC AUTO: 85.2 FL (ref 80–100)
MONOCYTES ABSOLUTE: 0.3 K/UL (ref 0–1.3)
MONOCYTES RELATIVE PERCENT: 5 %
NEUTROPHILS ABSOLUTE: 3.8 K/UL (ref 1.7–7.7)
NEUTROPHILS RELATIVE PERCENT: 66.5 %
PDW BLD-RTO: 15.3 % (ref 12.4–15.4)
PLATELET # BLD: 305 K/UL (ref 135–450)
PMV BLD AUTO: 6.8 FL (ref 5–10.5)
POTASSIUM REFLEX MAGNESIUM: 3.1 MMOL/L (ref 3.5–5.1)
RBC # BLD: 3.19 M/UL (ref 4–5.2)
SODIUM BLD-SCNC: 138 MMOL/L (ref 136–145)
WBC # BLD: 5.7 K/UL (ref 4–11)

## 2021-03-03 PROCEDURE — 6360000002 HC RX W HCPCS: Performed by: NURSE PRACTITIONER

## 2021-03-03 PROCEDURE — 97116 GAIT TRAINING THERAPY: CPT

## 2021-03-03 PROCEDURE — 2580000003 HC RX 258: Performed by: NURSE PRACTITIONER

## 2021-03-03 PROCEDURE — 6360000002 HC RX W HCPCS: Performed by: HOSPITALIST

## 2021-03-03 PROCEDURE — 97162 PT EVAL MOD COMPLEX 30 MIN: CPT

## 2021-03-03 PROCEDURE — 97166 OT EVAL MOD COMPLEX 45 MIN: CPT

## 2021-03-03 PROCEDURE — 6370000000 HC RX 637 (ALT 250 FOR IP): Performed by: NURSE PRACTITIONER

## 2021-03-03 PROCEDURE — APPNB30 APP NON BILLABLE TIME 0-30 MINS: Performed by: PHYSICIAN ASSISTANT

## 2021-03-03 PROCEDURE — 1200000000 HC SEMI PRIVATE

## 2021-03-03 PROCEDURE — 2580000003 HC RX 258: Performed by: HOSPITALIST

## 2021-03-03 PROCEDURE — 94760 N-INVAS EAR/PLS OXIMETRY 1: CPT

## 2021-03-03 PROCEDURE — 85025 COMPLETE CBC W/AUTO DIFF WBC: CPT

## 2021-03-03 PROCEDURE — 80048 BASIC METABOLIC PNL TOTAL CA: CPT

## 2021-03-03 PROCEDURE — 97530 THERAPEUTIC ACTIVITIES: CPT

## 2021-03-03 PROCEDURE — 83735 ASSAY OF MAGNESIUM: CPT

## 2021-03-03 PROCEDURE — 36415 COLL VENOUS BLD VENIPUNCTURE: CPT

## 2021-03-03 PROCEDURE — 2500000003 HC RX 250 WO HCPCS: Performed by: SURGERY

## 2021-03-03 RX ADMIN — CARBIDOPA AND LEVODOPA 1 TABLET: 25; 100 TABLET ORAL at 08:04

## 2021-03-03 RX ADMIN — CARBIDOPA AND LEVODOPA 1 TABLET: 25; 100 TABLET ORAL at 06:19

## 2021-03-03 RX ADMIN — CEFEPIME HYDROCHLORIDE 2000 MG: 2 INJECTION, POWDER, FOR SOLUTION INTRAVENOUS at 04:22

## 2021-03-03 RX ADMIN — ESOMEPRAZOLE SODIUM 40 MG: 40 INJECTION, POWDER, LYOPHILIZED, FOR SOLUTION INTRAVENOUS at 06:19

## 2021-03-03 RX ADMIN — VERAPAMIL HYDROCHLORIDE 360 MG: 240 TABLET, FILM COATED, EXTENDED RELEASE ORAL at 08:03

## 2021-03-03 RX ADMIN — CARBIDOPA AND LEVODOPA 1 TABLET: 25; 100 TABLET ORAL at 12:01

## 2021-03-03 RX ADMIN — SERTRALINE HYDROCHLORIDE 50 MG: 50 TABLET ORAL at 08:04

## 2021-03-03 RX ADMIN — APIXABAN 5 MG: 5 TABLET, FILM COATED ORAL at 22:00

## 2021-03-03 RX ADMIN — RIVASTIGMINE TARTRATE 1.5 MG: 1.5 CAPSULE ORAL at 22:00

## 2021-03-03 RX ADMIN — CARBIDOPA AND LEVODOPA 1 TABLET: 25; 100 TABLET ORAL at 14:46

## 2021-03-03 RX ADMIN — LISINOPRIL 5 MG: 5 TABLET ORAL at 08:03

## 2021-03-03 RX ADMIN — CEFEPIME HYDROCHLORIDE 2000 MG: 2 INJECTION, POWDER, FOR SOLUTION INTRAVENOUS at 15:56

## 2021-03-03 RX ADMIN — Medication 1 CAPSULE: at 08:04

## 2021-03-03 RX ADMIN — CARBIDOPA AND LEVODOPA 1 TABLET: 25; 100 TABLET ORAL at 17:29

## 2021-03-03 RX ADMIN — POLYETHYLENE GLYCOL 3350 17 G: 17 POWDER, FOR SOLUTION ORAL at 08:02

## 2021-03-03 RX ADMIN — Medication 1 CAPSULE: at 17:29

## 2021-03-03 RX ADMIN — MIRTAZAPINE 7.5 MG: 15 TABLET, FILM COATED ORAL at 22:00

## 2021-03-03 RX ADMIN — ONDANSETRON 4 MG: 2 INJECTION INTRAMUSCULAR; INTRAVENOUS at 20:36

## 2021-03-03 RX ADMIN — RIVASTIGMINE TARTRATE 1.5 MG: 1.5 CAPSULE ORAL at 08:04

## 2021-03-03 RX ADMIN — POTASSIUM BICARBONATE 40 MEQ: 782 TABLET, EFFERVESCENT ORAL at 08:07

## 2021-03-03 RX ADMIN — CARBIDOPA AND LEVODOPA 1 TABLET: 50; 200 TABLET, EXTENDED RELEASE ORAL at 22:00

## 2021-03-03 RX ADMIN — SODIUM CHLORIDE: 9 INJECTION, SOLUTION INTRAVENOUS at 23:20

## 2021-03-03 RX ADMIN — APIXABAN 5 MG: 5 TABLET, FILM COATED ORAL at 08:04

## 2021-03-03 NOTE — PLAN OF CARE
Problem: SAFETY  Goal: Free from accidental physical injury  3/2/2021 2154 by Patricia Ho RN  Outcome: Ongoing     Problem: SAFETY  Goal: Free from intentional harm  3/2/2021 2154 by Patricia Ho RN  Outcome: Ongoing     Problem: DAILY CARE  Goal: Daily care needs are met  3/2/2021 2154 by Patricia Ho RN  Outcome: Ongoing     Problem: PAIN  Goal: Patient's pain/discomfort is manageable  3/2/2021 2154 by Patricia Ho RN  Outcome: Ongoing     Problem: SKIN INTEGRITY  Goal: Skin integrity is maintained or improved  3/2/2021 2154 by Patricia Ho RN  Outcome: Ongoing     Problem: KNOWLEDGE DEFICIT  Goal: Patient/S.O. demonstrates understanding of disease process, treatment plan, medications, and discharge instructions.   3/2/2021 2154 by Patricia Ho RN  Outcome: Ongoing     Problem: DISCHARGE BARRIERS  Goal: Patient's continuum of care needs are met  3/2/2021 2154 by Patricia Ho RN  Outcome: Ongoing     Problem: Falls - Risk of:  Goal: Will remain free from falls  Description: Will remain free from falls  3/2/2021 2154 by Patricia Ho RN  Outcome: Ongoing     Problem: Falls - Risk of:  Goal: Absence of physical injury  Description: Absence of physical injury  3/2/2021 2154 by Patricia Ho RN  Outcome: Ongoing     Problem: Skin Integrity:  Goal: Will show no infection signs and symptoms  Description: Will show no infection signs and symptoms  3/2/2021 2154 by Patricia Ho RN  Outcome: Ongoing     Problem: Skin Integrity:  Goal: Absence of new skin breakdown  Description: Absence of new skin breakdown  3/2/2021 2154 by Patricia Ho RN  Outcome: Ongoing

## 2021-03-03 NOTE — PROGRESS NOTES
Physical Therapy    Facility/Department: [address] MED SURG  Initial Assessment  (co-eval)  If patient discharges prior to next session this note will serve as a discharge summary. Please see below for the latest assessment towards goals. NAME: Louis Figueroa  : 1935  MRN: [id number]    Date of Service: 3/3/2021    Discharge Recommendations:  Patient would benefit from continued therapy after discharge, 3-5 sessions per week   Louis Figueroa scored a  on the AM-PAC short mobility form. Current research shows that an AM-PAC score of 17 or less is typically not associated with a discharge to the patient's home setting. Based on the patient's AM-PAC score and their current functional mobility deficits, it is recommended that the patient have 3-5 sessions per week of Physical Therapy at d/c to increase the patient's independence. Please see assessment section for further patient specific details. PT Equipment Recommendations  Equipment Needed: No    Assessment   Assessment: The pt is an 81 yo female who came to the ED from her LTC facility with abd pain, nausea and vomiting. CT showed a SBO, suspected thrombus in R femoral vein, B inguinal hernias and hiatla hernia. The pt had an EGD on 2021 and a bx was taken. SBO was conservatively managed with NGT and bowel rest. NG tube was removed 3-2-2021. The pt is from [address] [place] [address] and recently has been needing more assist for mobility and self-care but has been able to walk with a rollator on her own in the past.      PMHx: HTN, neurogenic bladder, Parkinson's, PE, SP-catheter, IVC filter      Today, the pt presented as fatigued but willing to work with therapy. She has decreased B knee extension and flexion and decreased strength overall. She required mod/max A of 2 for bed mobility; mod A of 2 for transfers and mod A of 2 for short distance walking with the walker.  The pt is functioning below her reported baseline and is limited by her deficits of strength, balance and mobility. She would benefit from con't skilled PT services at d/c and will con't to follow while in the hospital.  Specific instructions for Next Treatment: cotx  Prognosis: Fair  Decision Making: Medium Complexity  History: see above  Exam: see above  Clinical Presentation: evolving  PT Education: PT Role;General Safety;Transfer Training;Gait Training  Barriers to Learning: none  REQUIRES PT FOLLOW UP: Yes  Activity Tolerance  Activity Tolerance: Patient limited by fatigue       Patient Diagnosis(es): The primary encounter diagnosis was Small bowel obstruction ([address] Diagnoses of Acute upper GI bleed, Non-intractable vomiting with nausea, unspecified vomiting type, Periumbilical abdominal pain, Hiatal hernia, Gallstones, Bilateral inguinal hernia without obstruction or gangrene, recurrence not specified, and Femoral vein thrombosis, right ([address] were also pertinent to this visit. has a past medical history of Contraindication to anticoagulation therapy, Hiatal hernia, High blood pressure, Neurogenic bladder, Parkinson disease ([address] pulmonary embolism, and Suprapubic catheter ([address] has a past surgical history that includes Tonsillectomy and adenoidectomy (1943); Appendectomy (1948); Dilation and curettage of uterus (1959 60 62 63); Hysterectomy (1974); Vagina surgery (2006); Abdomen surgery; Cystoscopy (N/A, 12/03/2020); IVC filter insertion (Right, 12/17/2020); and Upper gastrointestinal endoscopy (N/A, 2/26/2021). Restrictions  Restrictions/Precautions  Restrictions/Precautions: Fall Risk  Position Activity Restriction  Other position/activity restrictions: SP-catheter, uses heel lift in shoes  Vision/Hearing  Vision: Impaired  Vision Exceptions: Wears glasses at all times  Hearing: Within functional limits     Subjective  General  Chart Reviewed:  Yes  Additional Pertinent Hx: Per H&P on 2- of Cathie Santiago MD: The pt is an 81 yo female who came to the ED from her LTC facility with abd pain, nausea and vomiting. CT showed a SBO, suspected thrombus in R femoral vein, B inguinal hernias and hiatla hernia. The pt had an EGD on 2- and a bx was taken. SBO was conservatively managed with NGT and bowel rest. NG tube was removed 3-2-2021. PMHx: HTN, neurogenic bladder, Parkinson's, PE, SP-catheter, IVC filter  Response To Previous Treatment: Not applicable  Family / Caregiver Present: Yes(dgt)  Referring Practitioner: WILMAR Stone CNP  Referral Date : 03/03/21  Diagnosis: Small Bowel Obstruction vs. Ileus 2/2 UTI, Acute GI Bleed, UTI  Follows Commands: Within Functional Limits  Subjective  Subjective: the pt was found to be in the bed with dgt also in the room; the pt was agreeable to therapy; denid pain  Pain Screening  Patient Currently in Pain: Denies          Orientation  Orientation  Orientation Level: Oriented to person  Social/Functional History  Social/Functional History  Lives With: Alone  Type of Home: Assisted living([facility] apt, also has an apt in LTC section, in process of deciding which section pt will be returning)  Home Access: Elevator  Bathroom Shower/Tub: Walk-in shower, Shower chair with back  Bathroom Toilet: Handicap height  Bathroom Equipment: Grab bars around toilet, Grab bars in shower  Bathroom Accessibility: Accessible  Home Equipment: 4 wheeled walker, Leg , Wheelchair-manual, Reacher, Sock aid, Long-handled shoehorn, Electric scooter(adjustable bed with bedrails, heel lift in shoes)  ADL Assistance: Needs assistance(assist for bathing and dressing, suprapubic catheter, has difficulty with hygiene following a BM)  Homemaking Responsibilities: No  Ambulation Assistance: Independent(with [id number])  Transfer Assistance: Independent(uses leg )  Active : No  Occupation: Retired  Additional Comments: Pt reports last fall was ~2 months ago.   Cognition   Cognition  Overall Cognitive Status: Exceptions  Safety Strengthening, ROM  Safety Devices  Type of devices: Call light within reach, Bed alarm in place, Gait belt, Patient at risk for falls, Left in bed, Nurse notified      AM-PAC Score  AM-PAC Inpatient Mobility Raw Score : 11 (03/03/21 1448)  AM-PAC Inpatient T-Scale Score : 33.86 (03/03/21 1448)  Mobility Inpatient CMS 0-100% Score: 72.57 (03/03/21 1448)  Mobility Inpatient CMS G-Code Modifier : CL (03/03/21 1448)          Goals  Short term goals  Time Frame for Short term goals: upon d/c  Short term goal 1: Bed mobility with min/mod A of 1. Short term goal 2: Transfers sit <> stand with min/mod A of 1. Short term goal 3: Ambulate with wheeled walker 25 feet with min/mod A of 1.   Patient Goals   Patient goals : none stated       Therapy Time   Individual Concurrent Group Co-treatment   Time In 2747         Time Out 1448         Minutes 39         Timed Code Treatment Minutes: 24 Minutes       Electronically signed by Josias Kim, PT [id number] on 3/3/2021 at 2:58 PM

## 2021-03-03 NOTE — PROGRESS NOTES
Hospital Medicine Progress Note      Admit Date: 2/26/2021       CC: F/U for SBO, UTI    HPI: The patient is a 82 yrs old female, who  has a past medical history of Contraindication to anticoagulation therapy, Hiatal hernia, High blood pressure, Neurogenic bladder, Parkinson disease ([address] pulmonary embolism, and Suprapubic catheter ([address]  The patient had recently been admitted for R upper and lower lobe PE (12/31). She was initially treated with heparin gtt and was transitioned to PO eliquis. Initially there was some concern for [facility] [place] given that she is a fall risk and has an IVC filter. Ultimately she was discharged on eliquis. At the time, US of BLE was negative for DVT. She presented to [facility] ER on the date of admission with complaints of abd pain, nausea, vomiting, and coffee ground emesis. Furthermore - she was also constipated. CT A/P showed a small bowel obstruction with transition point in the RLQ of the abdomen, a suspected thrombus in the R femoral vein, bilateral inguinal hernias and a hiatal hernia. H&H was 11.8 and 35.2 at the time of presentation - which was close to her baseline. She was admitted for further workup and treatment of SBO. NGT was inserted. PPI Gtt was initiated. AC was held. H&H was monitored. GI was consulted. The patient underwent EGD on 2/26/21, which showed GI bleeding from gastritis. General surgery was consulted but SBO was managed conservatively with NGT and bowel rest. The patient had been started on IV rocephin for presumed UTI. UC resulted positive for pseudomonas. Abx were adjusted to cefepime for pseudomonal coverage.      3/1: replace K today. Cont NG tube per Gen Surg. States she is keeping water and ice down. No abd pain. No further vomiting.      3/2: still waiting to have BM. Can do suppos and start her miralax. Ok to remove NG tube. Cont cefepime for pseudomonas UTI. Interval History/Subjective: tolerating clears. meds in applesauce.   Today will be last day of stated age. Cooperative. HEENT: NG tube to LWS with no drainage;  Normocephalic, atraumatic. PERRLA. EOMi. Conjunctivae/corneas clear, no icterus, non-injected. Neck: Supple, with full range of motion. No jugular venous distention. Trachea midline. Respiratory:  Normal respiratory effort. Clear to auscultation, bilaterally without Rales/Wheezes/Rhonchi. Cardiovascular:  Regular rate and rhythm without murmurs, rubs or gallops. Abdomen: Soft, tenderness RLQ minimally on palpation; non-distended, without rebound or guarding. Normal bowel sounds. Musculoskeletal:  No clubbing, cyanosis or edema bilaterally. Full range of motion without deformity. Skin: Skin color, texture, turgor normal.  No rashes or lesions. Neurologic:  Neurovascularly intact without any focal sensory/motor deficits. Cranial nerves: II-XII intact, grossly intact. No facial asymmetry, tongue midline. Psychiatric:  Alert and oriented, thought content appropriate  Capillary Refill: Brisk,< 3 seconds   Peripheral Pulses: +2 palpable, equal bilaterally       LABS:    Lab Results   Component Value Date    WBC 5.7 03/03/2021    HGB 9.1 (L) 03/03/2021    HCT 27.2 (L) 03/03/2021    MCV 85.2 03/03/2021     03/03/2021    LYMPHOPCT 22.4 03/03/2021    RBC 3.19 (L) 03/03/2021    MCH 28.4 03/03/2021    MCHC 33.3 03/03/2021    RDW 15.3 03/03/2021       Lab Results   Component Value Date    CREATININE <0.5 (L) 03/03/2021    BUN 4 (L) 03/03/2021     03/03/2021    K 3.1 (L) 03/03/2021     03/03/2021    CO2 24 03/03/2021       Lab Results   Component Value Date    MG 1.90 03/03/2021       Lab Results   Component Value Date    ALT 17 02/26/2021    AST 19 02/26/2021    ALKPHOS 102 02/26/2021    BILITOT <0.2 02/26/2021        No flowsheet data found. No results found for: LABA1C    Imaging:  XR ABDOMEN (KUB) (SINGLE AP VIEW)   Final Result   NG tube tip and proximal side-port reside in the stomach.   There are   gas-filled loops of large and small bowel suggesting an ileus. The distal   small bowel obstruction noted on CT from 1 day earlier is not perceived on   the conventional radiographs. CT ABDOMEN PELVIS W IV CONTRAST Additional Contrast? None   Final Result   1. Small bowel obstruction with a transition point in the right lower   quadrant of the abdomen. 2. Gallstones without adjacent inflammatory changes. 3. Hiatal hernia. 4. Trace right pleural effusion. 5. Suspected thrombus in the right femoral vein. 6. Bilateral inguinal hernias. 7. Suprapubic catheter in the bladder lumen. XR CHEST PORTABLE   Final Result   Low lung volumes with bibasilar atelectasis, left side greater than right. There may be trace bilateral pleural effusions.              Scheduled and prn Medications:    Scheduled Meds:   polyethylene glycol  17 g Oral Daily    [Held by provider] enoxaparin  30 mg Subcutaneous Daily    apixaban  5 mg Oral BID    esomeprazole  40 mg Intravenous QAM AC    mirtazapine  7.5 mg Oral Nightly    cefepime  2,000 mg Intravenous Q12H    lisinopril  5 mg Oral Daily    pravastatin  20 mg Oral Nightly    sertraline  50 mg Oral Daily    lactobacillus  1 capsule Oral BID WC    carbidopa-levodopa  1 tablet Oral 5x Daily    verapamil  360 mg Oral QAM    rivastigmine  1.5 mg Oral BID    carbidopa-levodopa  1 tablet Oral Nightly    sodium chloride flush  10 mL Intravenous 2 times per day    sodium chloride flush  10 mL Intravenous 2 times per day    sodium chloride flush  10 mL Intravenous 2 times per day     Continuous Infusions:   sodium chloride 100 mL/hr at 03/02/21 2306     PRN Meds:.potassium chloride **OR** potassium alternative oral replacement **OR** potassium chloride, polyvinyl alcohol-povidone, sodium chloride flush, acetaminophen **OR** acetaminophen, sodium chloride flush, sodium chloride flush, phenol, promethazine **OR** ondansetron    Assessment & Plan:        Small Bowel Obstruction vs. Ileus 2/2 UTI  IV fluids  NG tube to LWS.   Diet NPO Effective Now  PRN analgesia  No peritoneal signs  IV push for PPI - stop infusion  Treating UTI with IV antibx- will be done after today  General Surgery following. GI consulted.      Acute GI Bleed  Oral anticoagulation - chronically + filter in place   Transfuse as needed to maintain hgb > 7  Serial h & h  PPI gtt  IVF  GI following. Appreciate recs. - s/p EGD 2/26/21  -  Gastric biopsies:  Acute gastritis with ulceration and congestion, suggestive of ischemic   gastritis. -  Negative for intestinal metaplasia, dysplasia or malignancy. - Bleeding is likely coming from gastritis with oozing   Currently holding eliquis/ASA - awaiting restart per Gen Surg/GI  (likely when H/H stabilizes)   - restart eliquis 3/2 since H/H stable  - cont bid pantoprazole x1 month after discharge per GI     Urinary Tract Infection, POA - due to pseudomonas   - Likely 2/2 chronic indwelling urinary catheter  UA +. UC positive for pseudomonas. Cefepime day 4  Sensitivities reviewed.     Recent PE (12/20)   Hx femoral vein thrombosis and PE  - eliquis chronically - restart now that h/h stable ok per GI  Had IVC filter placed at that time due to concerns of fall risk - though was started on eliquis. Suspected R  Femoral thrombus on CT 2/26, however US doppler was negative 01/03   - Complication of IVC placement? Currently holding eliquis due to GIB     HTN  Continue medications as ordered  Monitor BP  Titrate medications as needed.     HLD  Statin  Monitor for S/S of Rhabdomyolysis      Parkinson's disease  Takes sinemet at home   Home meds reviewed and ordered.      Neurogenic bladder  Has chronic indwelling suprapubic catheter.   Catheter should be changed every 30 days    Continue current regimen/therapies. Monitor. Adjust medical regimen as appropriate. Body mass index is 29.39 kg/m².     The patient and / or the family were informed of the results of any tests, a time was given to answer questions, a plan was proposed and they agreed with plan. DVT ppx: SCDs in setting of GIB  GI ppx: nexium    Diet: DIET CLEAR LIQUID;  Dietary Nutrition Supplements: Clear Liquid Oral Supplement    Consults:  IP CONSULT TO HOSPITALIST  IP CONSULT TO GENERAL SURGERY  IP CONSULT TO GENERAL SURGERY  IP CONSULT TO GI  IP CONSULT TO SPIRITUAL SERVICES    DISPO/placement plan: pending.  [place] [facility] [place] next couple days if continues to improve    Code Status: DNR-CCA      WILMAR Pacheco - JOAN  03/03/21

## 2021-03-03 NOTE — PROGRESS NOTES
Comprehensive Nutrition Assessment    Type and Reason for Visit:  Initial, NPO/Clear Liquid    Nutrition Recommendations/Plan:   1. Advance diet as appropriate per MD  2. Begin Ensure Clear TID  3. RD to advance ONS with diet  4. Monitor meal / supplement intakes, tolerance to diet advancement, and GI status    Nutrition Assessment:  LOS assessment. Pt has been NPO/clear liquid status X 5 days. Pt with small bowel obstruction and GI bleed from gastritis. Being managed with NG and IV fluids. NG removed yesterday and started on clear liquids. No BM. Will begin Ensure Clear TID and monitor for diet advancement. Malnutrition Assessment:  Malnutrition Status: At risk for malnutrition (Comment)      Estimated Daily Nutrient Needs:  Energy (kcal):  1689-8505 (20-25kcal/66kg); Weight Used for Energy Requirements:  Current     Protein (g):  79-92 (1.2-1.4g/66kg); Weight Used for Protein Requirements:  Ideal        Fluid (ml/day):  1 ml/kcal      Nutrition Related Findings:  -5 liters. Active BS. Nonpitting generalized edema. NaCl @ 100ml/hr      Wounds:  None       Current Nutrition Therapies:    DIET CLEAR LIQUID; Anthropometric Measures:  · Height: 4' 11\" (149.9 cm)  · Current Body Weight: 145 lb (65.8 kg)   · Admission Body Weight: 149 lb (67.6 kg)     · Ideal Body Weight: 95 lbs; % Ideal Body Weight 152.6 %   · BMI: 29.3  · Adjusted Body Weight:  ; No Adjustment   · BMI Categories: Overweight (BMI 25.0-29. 9)       Nutrition Diagnosis:   · Inadequate oral intake related to altered GI function as evidenced by NPO or clear liquid status due to medical condition    Nutrition Interventions:   Food and/or Nutrient Delivery:  Start Oral Nutrition Supplement(Advance diet as appropriate per MD)  Nutrition Education/Counseling:  Education not indicated   Coordination of Nutrition Care:  Continue to monitor while inpatient    Goals:   Tolerate diet advancement with PO intake greater than 50%       Nutrition Monitoring and

## 2021-03-03 NOTE — PROGRESS NOTES
Occupational Therapy   Occupational Therapy Initial Assessment  Date: 3/3/2021   Patient Name: Nicol Caldera  MRN: [id number]     : 1935    Date of Service: 3/3/2021    Discharge Recommendations:  3-5 sessions per week, Patient would benefit from continued therapy after discharge     Nicol Caldera scored a 14/24 on the AM-PAC ADL Inpatient form. Current research shows that an AM-PAC score of 17 or less is typically not associated with a discharge to the patient's home setting. Based on the patient's AM-PAC score and their current ADL deficits, it is recommended that the patient have 3-5 sessions per week of Occupational Therapy at d/c to increase the patient's independence. Please see assessment section for further patient specific details. If patient discharges prior to next session this note will serve as a discharge summary. Please see below for the latest assessment towards goals. Assessment   Performance deficits / Impairments: Decreased functional mobility ; Decreased ADL status; Decreased strength;Decreased endurance;Decreased balance  Assessment: Pt is an 82 y.o. female admitted with Small Bowel Obstruction vs. Ileus 2/2 UTI, UGI bleed. At baseline, pt lives in [facility], has assist for ADLs, and completes fxl mobility with 4WW. Pt currently functioning below baseline d/t the above deficits, today requiring mod A x1-2 for fxl transfers/mobility with walker limited to short distance, and anticipate pt would require overall max A for ADLs. Pt easily fatigued with minimal activity. Pt demonstrates need for ongoing skilled OT at d/c to maximize pt's safety and independence prior to return home.   Prognosis: Fair  Decision Making: Medium Complexity  History: see above  Exam: self-care, ROM/strength, balance/fxl mobility  Assistance / Modification: anticipate overall max A for ADLs  OT Education: OT Role;Plan of Care;Transfer Training  REQUIRES OT FOLLOW UP: Yes  Activity Tolerance  Activity Tolerance: Patient limited by fatigue  Safety Devices  Safety Devices in place: Yes  Type of devices: Call light within reach; Bed alarm in place; Left in bed;Gait belt;Nurse notified           Patient Diagnosis(es): The primary encounter diagnosis was Small bowel obstruction ([address] Diagnoses of Acute upper GI bleed, Non-intractable vomiting with nausea, unspecified vomiting type, Periumbilical abdominal pain, Hiatal hernia, Gallstones, Bilateral inguinal hernia without obstruction or gangrene, recurrence not specified, and Femoral vein thrombosis, right ([address] were also pertinent to this visit. has a past medical history of Contraindication to anticoagulation therapy, Hiatal hernia, High blood pressure, Neurogenic bladder, Parkinson disease ([address] pulmonary embolism, and Suprapubic catheter ([address] has a past surgical history that includes Tonsillectomy and adenoidectomy (1943); Appendectomy (1948); Dilation and curettage of uterus (1959 60 62 63); Hysterectomy (1974); Vagina surgery (2006); Abdomen surgery; Cystoscopy (N/A, 12/03/2020); IVC filter insertion (Right, 12/17/2020); and Upper gastrointestinal endoscopy (N/A, 2/26/2021). Restrictions  Restrictions/Precautions  Restrictions/Precautions: Fall Risk  Position Activity Restriction  Other position/activity restrictions: SP-catheter, uses heel lift in shoes    Subjective   General  Chart Reviewed: Yes  Additional Pertinent Hx: Per WILMAR Guerrero CNP's note 3/3: \"The patient is a 82 yrs old female, who  has a past medical history of Contraindication to anticoagulation therapy, Hiatal hernia, High blood pressure, Neurogenic bladder, Parkinson disease ([address] pulmonary embolism, and Suprapubic catheter ([address] The patient had recently been admitted for R upper and lower lobe PE (12/31). She was initially treated with heparin gtt and was transitioned to PO eliquis.  Initially there was some concern for [facility] given that she is a fall risk and has an IVC filter. Ultimately she was discharged on eliquis. At the time, US of BLE was negative for DVT. She presented to [facility] ER on the date of admission with complaints of abd pain, nausea, vomiting, and coffee ground emesis. Furthermore - she was also constipated. CT A/P showed a small bowel obstruction with transition point in the RLQ of the abdomen, a suspected thrombus in the R femoral vein, bilateral inguinal hernias and a hiatal hernia. H&H was 11.8 and 35.2 at the time of presentation - which was close to her baseline. She was admitted for further workup and treatment of SBO. NGT was inserted. PPI Gtt was initiated. AC was held. H&H was monitored. GI was consulted. The patient underwent EGD on 2/26/21, which showed GI bleeding from gastritis. General surgery was consulted but SBO was managed conservatively with NGT and bowel rest. The patient had been started on IV rocephin for presumed UTI. UC resulted positive for pseudomonas. Abx were adjusted to cefepime for pseudomonal coverage. \"  Family / Caregiver Present: Yes(daughter)  Referring Practitioner: WILMAR Stuart CNP  Diagnosis: Small Bowel Obstruction vs. Ileus 2/2 UTI, UGI bleed  Subjective  Subjective: Pt met b/s for OT eval/cotx with PT. Pt getting back into bed with nursing on arrival, agreeable to participate in therapy. Pt denies pain.     Social/Functional History  Social/Functional History  Lives With: Alone  Type of Home: Assisted living([facility] apt, also has an apt in LTC section, in process of deciding which section pt will be returning)  Home Access: Elevator  Bathroom Shower/Tub: Walk-in shower, Shower chair with back  Bathroom Toilet: Handicap height  Bathroom Equipment: Grab bars around toilet, Grab bars in shower  Bathroom Accessibility: Accessible  Home Equipment: 4 wheeled walker, Leg , Wheelchair-manual, Reacher, Sock aid, Long-handled shoehorn, Electric scooter(adjustable bed with bedrails, heel lift in shoes)  ADL Assistance: Needs assistance(assist for bathing and dressing, suprapubic catheter, has difficulty with hygiene following a BM)  Homemaking Responsibilities: No  Ambulation Assistance: Independent(with 0QT)  Transfer Assistance: Independent(uses leg )  Active : No  Occupation: Retired  Additional Comments: Pt reports last fall was ~2 months ago. Objective   Vision: Impaired  Vision Exceptions: Wears glasses at all times  Hearing: Within functional limits      Balance  Sitting Balance: Minimal assistance(seated EOB with min A d/t L lean (daughter reports pt has been having L lean for ~6 weeks))  Standing Balance: Moderate assistance(at walker)  Functional Mobility  Functional - Mobility Device: Rolling Walker  Assist Level: Moderate assistance  Functional Mobility Comments: Pt completed fxl mobility ~6 ft x2 with RW and Mod A x1-2. Pt slow with short shuffling steps, flexed at knees, needs increased assist around turns. ADL  Toileting: (suprapubic catheter)  Additional Comments: Anticipate pt is set-up feeding, max A bathing, dressing, total A toileting, min A grooming based on ROM/strength, balance, endurance. Bed mobility  Rolling to Right: Moderate assistance  Supine to Sit: Moderate assistance  Sit to Supine: Maximum assistance;2 Person assistance  Scooting: Dependent/Total;2 Person assistance     Transfers  Sit to stand:  Moderate assistance;2 Person assistance  Stand to sit: Moderate assistance     Cognition  Overall Cognitive Status: Exceptions  Safety Judgement: Decreased awareness of need for safety  Problem Solving: Decreased awareness of errors;Assistance required to identify errors made;Assistance required to generate solutions  Insights: Decreased awareness of deficits  Initiation: Requires cues for some  Sequencing: Requires cues for some     LUE AROM (degrees)  LUE AROM : WFL  RUE AROM (degrees)  RUE AROM : [facility] Plan  Times per week: 3-5  Times per day: Daily  Current Treatment Recommendations: Balance Training, Functional Mobility Training, Strengthening, ROM, Neuromuscular Re-education, Endurance Training, Safety Education & Training, Self-Care / ADL      AM-PAC Score        AM-PAC Inpatient Daily Activity Raw Score: 14 (03/03/21 1453)  AM-PAC Inpatient ADL T-Scale Score : 33.39 (03/03/21 1453)  ADL Inpatient CMS 0-100% Score: 59.67 (03/03/21 1453)  ADL Inpatient CMS G-Code Modifier : CK (03/03/21 1453)    Goals  Short term goals  Time Frame for Short term goals: Prior to d/c:  Short term goal 1: Pt will complete UB bathing/dressing with SBA. Short term goal 2: Pt will complete LB bathing/dressing with mod A. Short term goal 3: Pt will complete toileting with mod A. Short term goal 4: Pt will complete grooming in stance at sink with SBA/CGA. Short term goal 5: Pt will complete fxl mobility and fxl transfers to/from ADL surfaces with CGA/min A using AD. Long term goals  Time Frame for Long term goals : STG=LTG  Patient Goals   Patient goals : none stated.        Therapy Time   Individual Concurrent Group Co-treatment   Time In 1409         Time Out 1448         Minutes 39         Timed Code Treatment Minutes: 1425 [address], OTR/L [id number]

## 2021-03-03 NOTE — PROGRESS NOTES
General and Vascular Surgery                                                           Daily Progress Note                                                                  Pt Name: Irma López  Medical Record Number: [id number]  Date of Birth 1935   Today's Date: 3/3/2021      ASSESSMENT/PLAN  SBO vs ileus 2/2 UTI  -passing flatus, +BM    -abdomen mildly distended. nontender  -tolerating NG out and clear liquids. Advance to fulls.   -continue merrem for pseudomonas UTI  -UGI bleed - gastritis on EGD  -H/H stable, back on eliquis    SUBJECTIVE  Agatha Linares is improving. Passing flatus. Feels like she could have a BM but hasn't. Wants prune juice. Denies pain. OBJECTIVE  VITALS:  height is 4' 11\" (1.499 m) and weight is 145 lb 8.1 oz (66 kg). Her oral temperature is 98.9 °F (37.2 °C). Her blood pressure is 132/83 and her pulse is 78. Her respiration is 16 and oxygen saturation is 91%. VITALS:  /83   Pulse 78   Temp 98.9 °F (37.2 °C) (Oral)   Resp 16   Ht 4' 11\" (1.499 m)   Wt 145 lb 8.1 oz (66 kg)   SpO2 91%   BMI 29.39 kg/m²   GENERAL: alert, no distress  ABDOMEN: soft nontender nondistended  I/O last 3 completed shifts: In: 780 [P.O.:780]  Out: 1850 [RAPGT:9373]  I/O this shift:   In: 500 [P.O.:500]  Out: -     LABS  Recent Labs     03/03/21  0639   WBC 5.7   HGB 9.1*   HCT 27.2*         K 3.1*      CO2 24   BUN 4*   CREATININE <0.5*   MG 1.90   CALCIUM 8.1*     CBC:   Lab Results   Component Value Date    WBC 5.7 03/03/2021    RBC 3.19 03/03/2021    HGB 9.1 03/03/2021    HCT 27.2 03/03/2021    MCV 85.2 03/03/2021    MCH 28.4 03/03/2021    MCHC 33.3 03/03/2021    RDW 15.3 03/03/2021     03/03/2021    MPV 6.8 03/03/2021     CMP:    Lab Results   Component Value Date     03/03/2021    K 3.1 03/03/2021     03/03/2021    CO2 24 03/03/2021    BUN 4 03/03/2021    CREATININE <0.5 03/03/2021    GFRAA >60 03/03/2021    AGRATIO 1.0 02/26/2021    LABGLOM >60 03/03/2021    GLUCOSE 134 03/03/2021    PROT 7.2 02/26/2021    LABALBU 3.6 02/26/2021    CALCIUM 8.1 03/03/2021    BILITOT <0.2 02/26/2021    ALKPHOS 102 02/26/2021    AST 19 02/26/2021    ALT 17 02/26/2021           Electronically signed by WILMAR EastonC on 3/3/2021 at 12:06 PM    As above  PSBO vs ileus is resolving  Advance diet today    Electronically signed by Katie Arrington MD on 3/3/2021 at 7:49 PM

## 2021-03-04 LAB
ANION GAP SERPL CALCULATED.3IONS-SCNC: 7 MMOL/L (ref 3–16)
BASOPHILS ABSOLUTE: 0 K/UL (ref 0–0.2)
BASOPHILS RELATIVE PERCENT: 0.8 %
BUN BLDV-MCNC: 3 MG/DL (ref 7–20)
CALCIUM SERPL-MCNC: 8.2 MG/DL (ref 8.3–10.6)
CHLORIDE BLD-SCNC: 105 MMOL/L (ref 99–110)
CO2: 26 MMOL/L (ref 21–32)
CREAT SERPL-MCNC: <0.5 MG/DL (ref 0.6–1.2)
EOSINOPHILS ABSOLUTE: 0.3 K/UL (ref 0–0.6)
EOSINOPHILS RELATIVE PERCENT: 5.7 %
GFR AFRICAN AMERICAN: >60
GFR NON-AFRICAN AMERICAN: >60
GLUCOSE BLD-MCNC: 105 MG/DL (ref 70–99)
HCT VFR BLD CALC: 26.6 % (ref 36–48)
HEMOGLOBIN: 8.8 G/DL (ref 12–16)
LYMPHOCYTES ABSOLUTE: 1.5 K/UL (ref 1–5.1)
LYMPHOCYTES RELATIVE PERCENT: 24.4 %
MAGNESIUM: 1.7 MG/DL (ref 1.8–2.4)
MCH RBC QN AUTO: 28.4 PG (ref 26–34)
MCHC RBC AUTO-ENTMCNC: 33.2 G/DL (ref 31–36)
MCV RBC AUTO: 85.5 FL (ref 80–100)
MONOCYTES ABSOLUTE: 0.4 K/UL (ref 0–1.3)
MONOCYTES RELATIVE PERCENT: 6.2 %
NEUTROPHILS ABSOLUTE: 3.8 K/UL (ref 1.7–7.7)
NEUTROPHILS RELATIVE PERCENT: 62.9 %
PDW BLD-RTO: 15.3 % (ref 12.4–15.4)
PLATELET # BLD: 329 K/UL (ref 135–450)
PMV BLD AUTO: 6.8 FL (ref 5–10.5)
POTASSIUM REFLEX MAGNESIUM: 3.1 MMOL/L (ref 3.5–5.1)
RBC # BLD: 3.11 M/UL (ref 4–5.2)
SODIUM BLD-SCNC: 138 MMOL/L (ref 136–145)
WBC # BLD: 6.1 K/UL (ref 4–11)

## 2021-03-04 PROCEDURE — 80048 BASIC METABOLIC PNL TOTAL CA: CPT

## 2021-03-04 PROCEDURE — 6370000000 HC RX 637 (ALT 250 FOR IP): Performed by: NURSE PRACTITIONER

## 2021-03-04 PROCEDURE — 97535 SELF CARE MNGMENT TRAINING: CPT

## 2021-03-04 PROCEDURE — 2500000003 HC RX 250 WO HCPCS: Performed by: SURGERY

## 2021-03-04 PROCEDURE — 97530 THERAPEUTIC ACTIVITIES: CPT

## 2021-03-04 PROCEDURE — 94761 N-INVAS EAR/PLS OXIMETRY MLT: CPT

## 2021-03-04 PROCEDURE — 2580000003 HC RX 258: Performed by: ANESTHESIOLOGY

## 2021-03-04 PROCEDURE — 97116 GAIT TRAINING THERAPY: CPT

## 2021-03-04 PROCEDURE — 85025 COMPLETE CBC W/AUTO DIFF WBC: CPT

## 2021-03-04 PROCEDURE — 2580000003 HC RX 258: Performed by: HOSPITALIST

## 2021-03-04 PROCEDURE — 83735 ASSAY OF MAGNESIUM: CPT

## 2021-03-04 PROCEDURE — 36415 COLL VENOUS BLD VENIPUNCTURE: CPT

## 2021-03-04 PROCEDURE — 1200000000 HC SEMI PRIVATE

## 2021-03-04 RX ORDER — POTASSIUM CHLORIDE 750 MG/1
20 TABLET, FILM COATED, EXTENDED RELEASE ORAL
Status: DISCONTINUED | OUTPATIENT
Start: 2021-03-04 | End: 2021-03-05 | Stop reason: HOSPADM

## 2021-03-04 RX ADMIN — ESOMEPRAZOLE SODIUM 40 MG: 40 INJECTION, POWDER, LYOPHILIZED, FOR SOLUTION INTRAVENOUS at 06:05

## 2021-03-04 RX ADMIN — APIXABAN 5 MG: 5 TABLET, FILM COATED ORAL at 21:09

## 2021-03-04 RX ADMIN — RIVASTIGMINE TARTRATE 1.5 MG: 1.5 CAPSULE ORAL at 08:58

## 2021-03-04 RX ADMIN — SODIUM CHLORIDE, PRESERVATIVE FREE 10 ML: 5 INJECTION INTRAVENOUS at 09:10

## 2021-03-04 RX ADMIN — MIRTAZAPINE 7.5 MG: 15 TABLET, FILM COATED ORAL at 21:10

## 2021-03-04 RX ADMIN — CARBIDOPA AND LEVODOPA 1 TABLET: 25; 100 TABLET ORAL at 12:47

## 2021-03-04 RX ADMIN — PRAVASTATIN SODIUM 20 MG: 20 TABLET ORAL at 21:09

## 2021-03-04 RX ADMIN — Medication 1 CAPSULE: at 17:48

## 2021-03-04 RX ADMIN — CARBIDOPA AND LEVODOPA 1 TABLET: 25; 100 TABLET ORAL at 06:05

## 2021-03-04 RX ADMIN — POTASSIUM CHLORIDE 20 MEQ: 750 TABLET, FILM COATED, EXTENDED RELEASE ORAL at 09:09

## 2021-03-04 RX ADMIN — CARBIDOPA AND LEVODOPA 1 TABLET: 25; 100 TABLET ORAL at 17:48

## 2021-03-04 RX ADMIN — Medication 1 CAPSULE: at 08:59

## 2021-03-04 RX ADMIN — LISINOPRIL 5 MG: 5 TABLET ORAL at 08:58

## 2021-03-04 RX ADMIN — SODIUM CHLORIDE, PRESERVATIVE FREE 10 ML: 5 INJECTION INTRAVENOUS at 21:15

## 2021-03-04 RX ADMIN — VERAPAMIL HYDROCHLORIDE 360 MG: 240 TABLET, FILM COATED, EXTENDED RELEASE ORAL at 08:58

## 2021-03-04 RX ADMIN — CARBIDOPA AND LEVODOPA 1 TABLET: 25; 100 TABLET ORAL at 08:58

## 2021-03-04 RX ADMIN — RIVASTIGMINE TARTRATE 1.5 MG: 1.5 CAPSULE ORAL at 21:10

## 2021-03-04 RX ADMIN — SODIUM CHLORIDE: 9 INJECTION, SOLUTION INTRAVENOUS at 09:09

## 2021-03-04 RX ADMIN — SERTRALINE HYDROCHLORIDE 50 MG: 50 TABLET ORAL at 08:59

## 2021-03-04 RX ADMIN — POLYETHYLENE GLYCOL 3350 17 G: 17 POWDER, FOR SOLUTION ORAL at 08:59

## 2021-03-04 RX ADMIN — CARBIDOPA AND LEVODOPA 1 TABLET: 25; 100 TABLET ORAL at 14:56

## 2021-03-04 RX ADMIN — CARBIDOPA AND LEVODOPA 1 TABLET: 50; 200 TABLET, EXTENDED RELEASE ORAL at 21:10

## 2021-03-04 ASSESSMENT — PAIN SCALES - GENERAL
PAINLEVEL_OUTOF10: 0
PAINLEVEL_OUTOF10: 0

## 2021-03-04 NOTE — PROGRESS NOTES
General and Vascular Surgery                                                           Daily Progress Note                                                                  Pt Name: Frank Berger  Medical Record Number: [id number]  Date of Birth 1935   Today's Date: 3/4/2021      ASSESSMENT/PLAN  SBO vs ileus 2/2 UTI  -passing flatus, last BM 2 days ago    -abdomen mildly distended, unchanged from yesterday. nontender  -Advanced to fulls 3/3, nausea with out emesis with dinner, but at a fair amount. Tolerated breakfast without nausea. Advance to low fiber.   -continue merrem for pseudomonas UTI  -UGI bleed - gastritis on EGD  -H/H stable, back on eliquis    SUBJECTIVE  Rodri Rodríguez is improving. Passing flatus. Feels like she could have a BM but hasn't. Wants prune juice. Denies pain. OBJECTIVE  VITALS:  height is 4' 11\" (1.499 m) and weight is 148 lb 5.9 oz (67.3 kg). Her oral temperature is 98.4 °F (36.9 °C). Her blood pressure is 133/76 and her pulse is 78. Her respiration is 18 and oxygen saturation is 94%. VITALS:  /76   Pulse 78   Temp 98.4 °F (36.9 °C) (Oral)   Resp 18   Ht 4' 11\" (1.499 m)   Wt 148 lb 5.9 oz (67.3 kg)   SpO2 94%   BMI 29.97 kg/m²   GENERAL: alert, no distress  ABDOMEN: soft nontender nondistended  I/O last 3 completed shifts: In: 2800 [P.O.:1600;  I.V.:1200]  Out: 1675 [Urine:1675]  I/O this shift:  In: 240 [P.O.:240]  Out: 375 [Urine:375]    LABS  Recent Labs     03/04/21  0650   WBC 6.1   HGB 8.8*   HCT 26.6*         K 3.1*      CO2 26   BUN 3*   CREATININE <0.5*   MG 1.70*   CALCIUM 8.2*     CBC:   Lab Results   Component Value Date    WBC 6.1 03/04/2021    RBC 3.11 03/04/2021    HGB 8.8 03/04/2021    HCT 26.6 03/04/2021    MCV 85.5 03/04/2021    MCH 28.4 03/04/2021    MCHC 33.2 03/04/2021    RDW 15.3 03/04/2021     03/04/2021    MPV 6.8 03/04/2021     CMP:    Lab Results   Component Value Date     03/04/2021    K 3.1 03/04/2021     03/04/2021    CO2 26 03/04/2021    BUN 3 03/04/2021    CREATININE <0.5 03/04/2021    GFRAA >60 03/04/2021    AGRATIO 1.0 02/26/2021    LABGLOM >60 03/04/2021    GLUCOSE 105 03/04/2021    PROT 7.2 02/26/2021    LABALBU 3.6 02/26/2021    CALCIUM 8.2 03/04/2021    BILITOT <0.2 02/26/2021    ALKPHOS 102 02/26/2021    AST 19 02/26/2021    ALT 17 02/26/2021           Electronically signed by WILMAR Gentile CNP on 3/4/2021 at 12:13 PM    As above  Seems to be tolerating PO  Passing flatus, no recent BM but appears confortable  Advance diet    Electronically signed by Juana Noguera MD on 3/4/2021 at 2:49 PM

## 2021-03-04 NOTE — PLAN OF CARE
Problem: SAFETY  Goal: Free from accidental physical injury  3/3/2021 2032 by Giovanny Childress  Outcome: Ongoing  3/3/2021 1151 by Jean-Paul Galaviz RN  Outcome: Ongoing  Goal: Free from intentional harm  3/3/2021 2032 by Giovanny Childress  Outcome: Ongoing  3/3/2021 1151 by Jean-Paul Galaviz RN  Outcome: Ongoing     Problem: DAILY CARE  Goal: Daily care needs are met  3/3/2021 2032 by Giovanny Childress  Outcome: Ongoing  3/3/2021 1151 by Jean-Paul Galaviz RN  Outcome: Ongoing     Problem: PAIN  Goal: Patient's pain/discomfort is manageable  3/3/2021 2032 by Giovanny Childress  Outcome: Ongoing  3/3/2021 1151 by Jean-Paul Galaviz RN  Outcome: Ongoing     Problem: SKIN INTEGRITY  Goal: Skin integrity is maintained or improved  3/3/2021 2032 by Giovanny Childress  Outcome: Ongoing  3/3/2021 1151 by Jean-Paul Galaviz RN  Outcome: Ongoing     Problem: KNOWLEDGE DEFICIT  Goal: Patient/S.O. demonstrates understanding of disease process, treatment plan, medications, and discharge instructions.   3/3/2021 2032 by Giovanny Childress  Outcome: Ongoing  3/3/2021 1151 by Jean-Paul Galaviz RN  Outcome: Ongoing     Problem: DISCHARGE BARRIERS  Goal: Patient's continuum of care needs are met  3/3/2021 2032 by Giovanny Childress  Outcome: Ongoing  3/3/2021 1151 by Jean-Paul Galaviz RN  Outcome: Ongoing     Problem: Falls - Risk of:  Goal: Will remain free from falls  Description: Will remain free from falls  3/3/2021 2032 by Giovanny Childress  Outcome: Ongoing  3/3/2021 1151 by Jean-Paul Galaviz RN  Outcome: Ongoing  Goal: Absence of physical injury  Description: Absence of physical injury  3/3/2021 2032 by Giovanny Childress  Outcome: Ongoing  3/3/2021 1151 by Jean-Paul Galaviz RN  Outcome: Ongoing     Problem: Skin Integrity:  Goal: Will show no infection signs and symptoms  Description: Will show no infection signs and symptoms  3/3/2021 2032 by Giovanny Childress  Outcome: Ongoing  3/3/2021 1151 by Jean-Paul Galaviz RN  Outcome: Ongoing  Goal: Absence of new skin breakdown  Description: Absence of new skin breakdown  3/3/2021 2032 by Louisa Mcclendon  Outcome: Ongoing  3/3/2021 1151 by Sunitha Dick RN  Outcome: Ongoing     Problem: Nutrition  Goal: Optimal nutrition therapy  3/3/2021 2032 by Louisa Mcclendon  Outcome: Ongoing  3/3/2021 1151 by Sunitha Dick RN  Outcome: Ongoing

## 2021-03-04 NOTE — PROGRESS NOTES
Occupational Therapy  Facility/Department: [facility] 0X MED SURG  Daily Treatment Note  NAME: Polina Deal  : 1935  MRN: [id number]    Date of Service: 3/4/2021    Discharge Recommendations:  3-5 sessions per week, Patient would benefit from continued therapy after discharge     Polina Deal scored a 14/24 on the AM-PAC ADL Inpatient form. Current research shows that an AM-PAC score of 17 or less is typically not associated with a discharge to the patient's home setting. Based on the patient's AM-PAC score and their current ADL deficits, it is recommended that the patient have 3-5 sessions per week of Occupational Therapy at d/c to increase the patient's independence. Please see assessment section for further patient specific details. If patient discharges prior to next session this note will serve as a discharge summary. Please see below for the latest assessment towards goals. Assessment   Performance deficits / Impairments: Decreased functional mobility ; Decreased ADL status; Decreased strength;Decreased endurance;Decreased balance  Assessment: Pt making progress towards goals. Pt with improved standing tolerance/balance to complete fxl mobility further distances with RW and Min A, and to stand at sink for grooming with min A. Pt continues to require mod A for fxl transfers. Anticipate pt would require overall max A for ADLs. Pt demonstrates need for ongoing skilled OT at d/c to maximize pt's safety and independence prior to retun to Assisted Living. Prognosis: Fair;Good  OT Education: OT Role;Plan of Care;Transfer Training;ADL Adaptive Strategies  REQUIRES OT FOLLOW UP: Yes  Activity Tolerance  Activity Tolerance: Patient Tolerated treatment well  Safety Devices  Safety Devices in place: Yes  Type of devices: Call light within reach; Bed alarm in place; Left in bed         Patient Diagnosis(es): The primary encounter diagnosis was Small bowel obstruction ([facility] [address]  Diagnoses of Acute upper GI bleed, Non-intractable vomiting with nausea, unspecified vomiting type, Periumbilical abdominal pain, Hiatal hernia, Gallstones, Bilateral inguinal hernia without obstruction or gangrene, recurrence not specified, and Femoral vein thrombosis, right ([address] were also pertinent to this visit. has a past medical history of Contraindication to anticoagulation therapy, Hiatal hernia, High blood pressure, Neurogenic bladder, Parkinson disease ([address] pulmonary embolism, and Suprapubic catheter ([address] has a past surgical history that includes Tonsillectomy and adenoidectomy (1943); Appendectomy (1948); Dilation and curettage of uterus (1959 60 62 63); Hysterectomy (1974); Vagina surgery (2006); Abdomen surgery; Cystoscopy (N/A, 12/03/2020); IVC filter insertion (Right, 12/17/2020); and Upper gastrointestinal endoscopy (N/A, 2/26/2021). Restrictions  Restrictions/Precautions  Restrictions/Precautions: Fall Risk  Position Activity Restriction  Other position/activity restrictions: SP-catheter, uses heel lift in shoes  Subjective   General  Chart Reviewed: Yes  Additional Pertinent Hx: Per WILMAR Newman - CNP's note 3/3: \"The patient is a 82 yrs old female, who  has a past medical history of Contraindication to anticoagulation therapy, Hiatal hernia, High blood pressure, Neurogenic bladder, Parkinson disease ([address] pulmonary embolism, and Suprapubic catheter ([address] The patient had recently been admitted for R upper and lower lobe PE (12/31). She was initially treated with heparin gtt and was transitioned to PO eliquis. Initially there was some concern for [facility] given that she is a fall risk and has an IVC filter. Ultimately she was discharged on eliquis. At the time, US of BLE was negative for DVT. She presented to [facility] ER on the date of admission with complaints of abd pain, nausea, vomiting, and coffee ground emesis. Furthermore - she was also constipated.  CT A/P showed a small bowel obstruction with transition point in the RLQ of the abdomen, a suspected thrombus in the R femoral vein, bilateral inguinal hernias and a hiatal hernia. H&H was 11.8 and 35.2 at the time of presentation - which was close to her baseline. She was admitted for further workup and treatment of SBO. NGT was inserted. PPI Gtt was initiated. AC was held. H&H was monitored. GI was consulted. The patient underwent EGD on 2/26/21, which showed GI bleeding from gastritis. General surgery was consulted but SBO was managed conservatively with NGT and bowel rest. The patient had been started on IV rocephin for presumed UTI. UC resulted positive for pseudomonas. Abx were adjusted to cefepime for pseudomonal coverage. \"  Family / Caregiver Present: No  Referring Practitioner: WILMAR Aguilar CNP  Diagnosis: Small Bowel Obstruction vs. Ileus 2/2 UTI, UGI bleed  Subjective  Subjective: Pt met b/s for OT cotx with PT. Pt in bed on arrival, reports she just got back into bed from BR but agreeable to participate in therapy. Pt denies pain. Objective    ADL  Grooming: Minimal assistance(seated at sink to brush teeth with SBA, stood to rinse with min A for balance)  Toileting: (catheter)     Balance  Sitting Balance: Minimal assistance(seated EOB with min A initially progressing to SBA)  Standing Balance: Moderate assistance(mod A initially progressing to min A at walker, unsteady with posterior lean, flexed posture, verbal cues for upright stance and to shift weight forward)  Functional Mobility  Functional - Mobility Device: Rolling Walker  Assist Level: Minimal assistance  Functional Mobility Comments: Pt completed fxl mobility ~3-4 ft bed-chair, ~15 ft recliner-sink, 10 ft sink-bed with RW and Min A with chair follow. Pt unsteady with posterior lean, short shuffling steps, needs increased assist around turns.     Bed mobility  Supine to Sit: Moderate assistance  Sit to Supine: Maximum assistance;2 Person assistance  Scooting: Dependent/Total;2 Person assistance(toward HOB while supine)     Transfers  Sit to stand: Moderate assistance  Stand to sit: Minimal assistance        Plan   Plan  Times per week: 3-5  Times per day: Daily  Current Treatment Recommendations: Balance Training, Functional Mobility Training, Strengthening, ROM, Neuromuscular Re-education, Endurance Training, Safety Education & Training, Self-Care / ADL    AM-PAC Score        AM-[facility] Inpatient Daily Activity Raw Score: 14 (03/04/21 1535)  AM-PAC Inpatient ADL T-Scale Score : 33.39 (03/04/21 1535)  ADL Inpatient CMS 0-100% Score: 59.67 (03/04/21 1535)  ADL Inpatient CMS G-Code Modifier : CK (03/04/21 1535)    Goals  Short term goals  Time Frame for Short term goals: Prior to d/c: STATUS GOALS 3/4: ALL GOALS ONGOING  Short term goal 1: Pt will complete UB bathing/dressing with SBA. Short term goal 2: Pt will complete LB bathing/dressing with mod A. Short term goal 3: Pt will complete toileting with mod A. Short term goal 4: Pt will complete grooming in stance at sink with SBA/CGA. Short term goal 5: Pt will complete fxl mobility and fxl transfers to/from ADL surfaces with CGA/min A using AD. Long term goals  Time Frame for Long term goals : STG=LTG  Patient Goals   Patient goals : none stated.        Therapy Time   Individual Concurrent Group Co-treatment   Time In [address]         Time Out 1525         Minutes Marta Ruiz [address] 0683

## 2021-03-04 NOTE — DISCHARGE INSTR - COC
Continuity of Care Form    Patient Name: Esa Roman   :  1935  MRN:  [id number]    Admit date:  2021  Discharge date:  ***    Code Status Order: DNR-CCA   Advance Directives:   [facility] Documentation       Date/Time Healthcare Directive Type of Healthcare Directive Copy in [facility] [address] Agent's Name Healthcare Agent's Phone Number    [phone]  No, patient does not have an advance directive for healthcare treatment -- -- -- -- --            Admitting Physician:  Kirby Esquivel DO  PCP: Nilam Valladares    Discharging Nurse: [facility] Unit/Room#: R4E-6081/1762-70  Discharging Unit Phone Number: ***    Emergency Contact:   Extended Emergency Contact Information  Primary Emergency Contact: [facility] Phone: [phone]  Mobile Phone: [phone]  Relation: Child   needed? Yes  Secondary Emergency Contact:  [facility] Phone: [phone]  Mobile Phone: [phone]  Relation: Child    Past Surgical History:  Past Surgical History:   Procedure Laterality Date    ABDOMEN SURGERY      bowel obstruction    APPENDECTOMY  194    CYSTOSCOPY N/A 2020    CYSTOSCOPY,  WITH INTRAVESICAL INJECTION OF BOTOX 200UNITS, WITH SUPRAPUBIC TUBE EXCHANGE performed by Harini Coleman MD at [address] [place] [address]    IVC FILTER INSERTION Right 2020    Dr. Dagoberto Galeas. [place] retreivable IVC filter    TONSILLECTOMY AND ADENOIDECTOMY  1943    UPPER GASTROINTESTINAL ENDOSCOPY N/A 2021    EGD BIOPSY performed by Gracie Chávez MD at [facility]  2006    repair vagina bladder and rectum       Immunization History: There is no immunization history on file for this patient.     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    Nausea and vomiting R11.2    History of pulmonary embolism Z86.711       Isolation/Infection:   Isolation            No Isolation          Patient Infection Status       Infection Onset Added Last Indicated Last Indicated By Review Planned Expiration Resolved Resolved By    None active    Resolved    COVID-19 Rule Out 02/26/21 02/26/21 02/26/21 COVID-19, Rapid (Ordered)   02/26/21 Rule-Out Test Resulted    COVID-19 Rule Out 12/17/20 12/17/20 12/17/20 COVID-19 (Ordered)   12/17/20 Rule-Out Test Resulted    COVID-19 Rule Out 12/13/20 12/13/20 12/13/20 COVID-19 (Ordered)   12/13/20 Rule-Out Test Resulted            Nurse Assessment:  Last Vital Signs: BP (!) 148/77   Pulse 78   Temp 98.4 °F (36.9 °C) (Oral)   Resp 16   Ht 4' 11\" (1.499 m)   Wt 148 lb 5.9 oz (67.3 kg)   SpO2 91%   BMI 29.97 kg/m²     Last documented pain score (0-10 scale): Pain Level: 0  Last Weight:   Wt Readings from Last 1 Encounters:   03/04/21 148 lb 5.9 oz (67.3 kg)     Mental Status:  oriented and alert    IV Access:  - None    Nursing Mobility/ADLs:  Walking   Assisted  Transfer  Assisted  Bathing  Assisted  Dressing  Assisted  Toileting  Assisted  Feeding  Independent  Med Admin  Assisted  Med Delivery   whole and prefers mixed with applesauce    Wound Care Documentation and Therapy:        Elimination:  Continence:   · Bowel: Yes  · Bladder: Yes  Urinary Catheter: chronic suprapubic   Colostomy/Ileostomy/Ileal Conduit: No       Date of Last BM: 3/5/21    Intake/Output Summary (Last 24 hours) at 3/4/2021 0853  Last data filed at 3/4/2021 0453  Gross per 24 hour   Intake 2800 ml   Output 1675 ml   Net 1125 ml     I/O last 3 completed shifts: In: 2800 [P.O.:1600; I.V.:1200]  Out: 1675 [Urine:1675]    Safety Concerns:      At Risk for Falls    Impairments/Disabilities:      None    Nutrition Therapy:  Current Nutrition Therapy:   - Oral Diet:  Low Fiber    Routes of Feeding: Oral  Liquids: Thin Liquids  Daily Fluid Restriction: no  Last Modified Barium Swallow with Video (Video Swallowing Test): not done    Treatments at the Time of Hospital Discharge:   Respiratory Treatments: ***  Oxygen Therapy:  is not on home oxygen therapy. Ventilator:    - No ventilator support    Rehab Therapies: Physical Therapy and Occupational Therapy  Weight Bearing Status/Restrictions: No weight bearing restirctions  Other Medical Equipment (for information only, NOT a DME order):  stedy  Other Treatments: ***    Patient's personal belongings (please select all that are sent with patient):  {[facility] DME Belongings:[id number]}    RN SIGNATURE:  Electronically signed by Melody Narvaez RN on 3/5/21 at 3:37 PM EST    CASE MANAGEMENT/SOCIAL WORK SECTION    Inpatient Status Date: 2/26/2021    Readmission Risk Assessment Score:  Readmission Risk              Risk of Unplanned Readmission:        21           Discharging to Facility/ Agency   · Name: [facility]  · Address:  · Phone:[phone]  · Fax:    Dialysis Facility (if applicable)   · Name:  · Address:  · Dialysis Schedule:  · Phone:  · Fax:    / signature: Electronically signed by SERGIO Gunter on 3/5/21 at 2:45 PM EST    PHYSICIAN SECTION    Prognosis: Good    Condition at Discharge: Stable    Rehab Potential (if transferring to Rehab): Good    Recommended Labs or Other Treatments After Discharge: RN/PT/OT    Physician Certification: I certify the above information and transfer of Anu Salazar  is necessary for the continuing treatment of the diagnosis listed and that she requires [place] for less 30 days.      Update Admission H&P: No change in H&P    PHYSICIAN SIGNATURE:  Electronically signed by WILMAR Yang CNP on 3/5/21 at 8:27 AM EST/ Dr. Pradip Morales

## 2021-03-04 NOTE — PROGRESS NOTES
Physical Therapy  Facility/Department: [address] MED SURG  Daily Treatment Note  NAME: Iggy Blanco  : 1935  MRN: [id number]    Date of Service: 3/4/2021    Discharge Recommendations:  Patient would benefit from continued therapy after discharge, 3-5 sessions per week   PT Equipment Recommendations  Equipment Needed: No    Iggy Blanco scored a  on the AM-PAC short mobility form. Current research shows that an AM-PAC score of 17 or less is typically not associated with a discharge to the patient's home setting. Based on the patient's AM-PAC score and their current functional mobility deficits, it is recommended that the patient have 3-5 sessions per week of Physical Therapy at d/c to increase the patient's independence. Please see assessment section for further patient specific details. If patient discharges prior to next session this note will serve as a discharge summary. Please see below for the latest assessment towards goals. Assessment   Assessment: Pt demonstrating improvement with activity tolerance and gait distance this session. Pt continues to require min/mod A for transfers and short distance ambulation with RW. Will continue to progress mobility as pt tolerates. Recommend continued therapy at discharge. Prognosis: Fair  PT Education: PT Role;General Safety;Transfer Training;Gait Training  REQUIRES PT FOLLOW UP: Yes  Activity Tolerance  Activity Tolerance: Patient limited by fatigue;Patient Tolerated treatment well     Patient Diagnosis(es): The primary encounter diagnosis was Small bowel obstruction ([address] Diagnoses of Acute upper GI bleed, Non-intractable vomiting with nausea, unspecified vomiting type, Periumbilical abdominal pain, Hiatal hernia, Gallstones, Bilateral inguinal hernia without obstruction or gangrene, recurrence not specified, and Femoral vein thrombosis, right ([address] were also pertinent to this visit.      has a past medical history of Contraindication to increasing step length  Distance: 5', 15' and 10'     Balance  Sitting - Static: Fair  Standing - Static: Fair  Standing - Dynamic: Fair;-  Comments: sitting balance EOB min A initially progressing to SBA; standing balance mod A at walker while brushing teeth at sink    AM-PAC Score  AM-PAC Inpatient Mobility Raw Score : 11 (03/04/21 1532)  AM-PAC Inpatient T-Scale Score : 33.86 (03/04/21 1532)  Mobility Inpatient CMS 0-100% Score: 72.57 (03/04/21 1532)  Mobility Inpatient CMS G-Code Modifier : CL (03/04/21 1532)        Goals  Short term goals  Time Frame for Short term goals: upon d/c  Short term goal 1: Bed mobility with min/mod A of 1. Short term goal 2: Transfers sit <> stand with min/mod A of 1. Short term goal 3: Ambulate with wheeled walker 25 feet with min/mod A of 1.   Patient Goals   Patient goals : none stated    Plan    Plan  Times per week: 3-5x/week  Specific instructions for Next Treatment: cotx  Current Treatment Recommendations: Transfer Training, Gait Training, Functional Mobility Training, Strengthening, ROM  Safety Devices  Type of devices: Call light within reach, Bed alarm in place, Gait belt, Patient at risk for falls, Left in bed, Nurse notified     Therapy Time   Individual Concurrent Group Co-treatment   Time In 1455         Time Out 1525         Minutes 30         Timed Code Treatment Minutes: [id number] [address] [facility],   [facility]

## 2021-03-05 VITALS
DIASTOLIC BLOOD PRESSURE: 78 MMHG | TEMPERATURE: 97.7 F | WEIGHT: 148.37 LBS | OXYGEN SATURATION: 94 % | RESPIRATION RATE: 16 BRPM | HEART RATE: 80 BPM | BODY MASS INDEX: 29.91 KG/M2 | SYSTOLIC BLOOD PRESSURE: 138 MMHG | HEIGHT: 59 IN

## 2021-03-05 LAB
ANION GAP SERPL CALCULATED.3IONS-SCNC: 7 MMOL/L (ref 3–16)
BASOPHILS ABSOLUTE: 0 K/UL (ref 0–0.2)
BASOPHILS RELATIVE PERCENT: 0.6 %
BUN BLDV-MCNC: 6 MG/DL (ref 7–20)
CALCIUM SERPL-MCNC: 9 MG/DL (ref 8.3–10.6)
CHLORIDE BLD-SCNC: 103 MMOL/L (ref 99–110)
CO2: 28 MMOL/L (ref 21–32)
CREAT SERPL-MCNC: <0.5 MG/DL (ref 0.6–1.2)
EOSINOPHILS ABSOLUTE: 0.3 K/UL (ref 0–0.6)
EOSINOPHILS RELATIVE PERCENT: 6.3 %
GFR AFRICAN AMERICAN: >60
GFR NON-AFRICAN AMERICAN: >60
GLUCOSE BLD-MCNC: 112 MG/DL (ref 70–99)
HCT VFR BLD CALC: 27.4 % (ref 36–48)
HEMOGLOBIN: 9.1 G/DL (ref 12–16)
LYMPHOCYTES ABSOLUTE: 1.5 K/UL (ref 1–5.1)
LYMPHOCYTES RELATIVE PERCENT: 27.2 %
MAGNESIUM: 2 MG/DL (ref 1.8–2.4)
MCH RBC QN AUTO: 28.8 PG (ref 26–34)
MCHC RBC AUTO-ENTMCNC: 33.4 G/DL (ref 31–36)
MCV RBC AUTO: 86.2 FL (ref 80–100)
MONOCYTES ABSOLUTE: 0.5 K/UL (ref 0–1.3)
MONOCYTES RELATIVE PERCENT: 8.4 %
NEUTROPHILS ABSOLUTE: 3.1 K/UL (ref 1.7–7.7)
NEUTROPHILS RELATIVE PERCENT: 57.5 %
PDW BLD-RTO: 15.7 % (ref 12.4–15.4)
PLATELET # BLD: 340 K/UL (ref 135–450)
PMV BLD AUTO: 6.8 FL (ref 5–10.5)
POTASSIUM REFLEX MAGNESIUM: 3.5 MMOL/L (ref 3.5–5.1)
RBC # BLD: 3.18 M/UL (ref 4–5.2)
SARS-COV-2, NAAT: NOT DETECTED
SODIUM BLD-SCNC: 138 MMOL/L (ref 136–145)
WBC # BLD: 5.4 K/UL (ref 4–11)

## 2021-03-05 PROCEDURE — APPSS15 APP SPLIT SHARED TIME 0-15 MINUTES: Performed by: NURSE PRACTITIONER

## 2021-03-05 PROCEDURE — 87635 SARS-COV-2 COVID-19 AMP PRB: CPT

## 2021-03-05 PROCEDURE — 6370000000 HC RX 637 (ALT 250 FOR IP): Performed by: NURSE PRACTITIONER

## 2021-03-05 PROCEDURE — 97530 THERAPEUTIC ACTIVITIES: CPT

## 2021-03-05 PROCEDURE — 97116 GAIT TRAINING THERAPY: CPT

## 2021-03-05 PROCEDURE — 80048 BASIC METABOLIC PNL TOTAL CA: CPT

## 2021-03-05 PROCEDURE — APPNB15 APP NON BILLABLE TIME 0-15 MINS: Performed by: NURSE PRACTITIONER

## 2021-03-05 PROCEDURE — 83735 ASSAY OF MAGNESIUM: CPT

## 2021-03-05 PROCEDURE — 94760 N-INVAS EAR/PLS OXIMETRY 1: CPT

## 2021-03-05 PROCEDURE — 97535 SELF CARE MNGMENT TRAINING: CPT

## 2021-03-05 PROCEDURE — 36415 COLL VENOUS BLD VENIPUNCTURE: CPT

## 2021-03-05 PROCEDURE — 2500000003 HC RX 250 WO HCPCS: Performed by: SURGERY

## 2021-03-05 PROCEDURE — 2580000003 HC RX 258: Performed by: ANESTHESIOLOGY

## 2021-03-05 PROCEDURE — 85025 COMPLETE CBC W/AUTO DIFF WBC: CPT

## 2021-03-05 RX ORDER — POTASSIUM CHLORIDE 1500 MG/1
20 TABLET, FILM COATED, EXTENDED RELEASE ORAL
Qty: 30 TABLET | Refills: 3 | Status: SHIPPED | OUTPATIENT
Start: 2021-03-06

## 2021-03-05 RX ADMIN — POTASSIUM CHLORIDE 20 MEQ: 750 TABLET, FILM COATED, EXTENDED RELEASE ORAL at 08:09

## 2021-03-05 RX ADMIN — CARBIDOPA AND LEVODOPA 1 TABLET: 25; 100 TABLET ORAL at 08:10

## 2021-03-05 RX ADMIN — RIVASTIGMINE TARTRATE 1.5 MG: 1.5 CAPSULE ORAL at 08:09

## 2021-03-05 RX ADMIN — CARBIDOPA AND LEVODOPA 2 TABLET: 25; 100 TABLET ORAL at 17:47

## 2021-03-05 RX ADMIN — ESOMEPRAZOLE SODIUM 40 MG: 40 INJECTION, POWDER, LYOPHILIZED, FOR SOLUTION INTRAVENOUS at 06:28

## 2021-03-05 RX ADMIN — LISINOPRIL 5 MG: 5 TABLET ORAL at 08:09

## 2021-03-05 RX ADMIN — SERTRALINE HYDROCHLORIDE 50 MG: 50 TABLET ORAL at 08:09

## 2021-03-05 RX ADMIN — Medication 1 CAPSULE: at 17:47

## 2021-03-05 RX ADMIN — VERAPAMIL HYDROCHLORIDE 360 MG: 240 TABLET, FILM COATED, EXTENDED RELEASE ORAL at 08:09

## 2021-03-05 RX ADMIN — APIXABAN 5 MG: 5 TABLET, FILM COATED ORAL at 08:09

## 2021-03-05 RX ADMIN — CARBIDOPA AND LEVODOPA 1 TABLET: 25; 100 TABLET ORAL at 06:28

## 2021-03-05 RX ADMIN — Medication 1 CAPSULE: at 08:09

## 2021-03-05 RX ADMIN — CARBIDOPA AND LEVODOPA 2 TABLET: 25; 100 TABLET ORAL at 12:24

## 2021-03-05 RX ADMIN — SODIUM CHLORIDE, PRESERVATIVE FREE 10 ML: 5 INJECTION INTRAVENOUS at 10:17

## 2021-03-05 RX ADMIN — POLYETHYLENE GLYCOL 3350 17 G: 17 POWDER, FOR SOLUTION ORAL at 10:16

## 2021-03-05 RX ADMIN — CARBIDOPA AND LEVODOPA 2 TABLET: 25; 100 TABLET ORAL at 15:23

## 2021-03-05 ASSESSMENT — PAIN SCALES - GENERAL: PAINLEVEL_OUTOF10: 0

## 2021-03-05 NOTE — PLAN OF CARE
Problem: SAFETY  Goal: Free from accidental physical injury  3/4/2021 2338 by Kathi Holm RN  Outcome: Ongoing     Problem: SAFETY  Goal: Free from intentional harm  3/4/2021 2338 by Kathi Holm RN  Outcome: Ongoing     Problem: DAILY CARE  Goal: Daily care needs are met  3/4/2021 2338 by Kathi Holm RN  Outcome: Ongoing     Problem: PAIN  Goal: Patient's pain/discomfort is manageable  3/4/2021 2338 by Kathi Holm RN  Outcome: Ongoing     Problem: SKIN INTEGRITY  Goal: Skin integrity is maintained or improved  3/4/2021 2338 by Kathi Holm RN  Outcome: Ongoing     Problem: KNOWLEDGE DEFICIT  Goal: Patient/S.O. demonstrates understanding of disease process, treatment plan, medications, and discharge instructions.   3/4/2021 2338 by Kathi Holm RN  Outcome: Ongoing     Problem: DISCHARGE BARRIERS  Goal: Patient's continuum of care needs are met  3/4/2021 2338 by Kathi Holm RN  Outcome: Ongoing     Problem: Falls - Risk of:  Goal: Will remain free from falls  Description: Will remain free from falls  3/4/2021 2338 by Kathi Holm RN  Outcome: Ongoing     Problem: Falls - Risk of:  Goal: Absence of physical injury  Description: Absence of physical injury  3/4/2021 2338 by Kathi Holm RN  Outcome: Ongoing     Problem: Skin Integrity:  Goal: Will show no infection signs and symptoms  Description: Will show no infection signs and symptoms  3/4/2021 2338 by Kathi Holm RN  Outcome: Ongoing     Problem: Skin Integrity:  Goal: Absence of new skin breakdown  Description: Absence of new skin breakdown  3/4/2021 2338 by Kathi Holm RN  Outcome: Ongoing     Problem: Nutrition  Goal: Optimal nutrition therapy  3/4/2021 2338 by Kathi Holm RN  Outcome: Ongoing

## 2021-03-05 NOTE — DISCHARGE SUMMARY
Hospital Medicine Discharge Summary    Patient ID: Dawood Johnson      Patient's PCP: Sil Dumont    Admit Date: 2/26/2021     Discharge Date:   3/5/21    Admitting Physician: Marianne Kingston DO     Discharge Physician: Eda Levine, APRN - CNP       Discharge Diagnoses: Active Hospital Problems    Diagnosis Date Noted    Small bowel obstruction ([facility] [address] [F85.832] 02/26/2021    Femoral vein thrombosis (HCC) [I82.419] 02/26/2021    Nausea and vomiting [R11.2] 02/26/2021    History of pulmonary embolism [Z86.711] 02/26/2021       The patient was seen and examined on day of discharge and this discharge summary is in conjunction with any daily progress note from day of discharge. Disposition:  [] Home  [] Home with home health [] Rehab [] Psych [x] SNF  [] LTAC  [] Long term nursing home or group home [] Transfer to ICU  [] Transfer to PCU [] Other:      Discharge Instructions/Follow-up:  PCP 3-4 days post discharge     PCP/SNF to follow up: as needed    D/C condition: stable    Code status: DNR-CCA    Activity: with assistance    Diet: low fiber     D/C Meds: protonix bid on discharge  Potassium oral supplements    Hospital Course: The patient is a 82 yrs old female, who  has a past medical history of Contraindication to anticoagulation therapy, Hiatal hernia, High blood pressure, Neurogenic bladder, Parkinson disease ([address] pulmonary embolism, and Suprapubic catheter ([address]  The patient had recently been admitted for R upper and lower lobe PE (12/31). She was initially treated with heparin gtt and was transitioned to PO eliquis. Initially there was some concern for [facility] given that she is a fall risk and has an IVC filter. Ultimately she was discharged on eliquis. At the time, US of BLE was negative for DVT. She presented to [facility] ER on the date of admission with complaints of abd pain, nausea, vomiting, and coffee ground emesis. Furthermore - she was also constipated.  CT A/P showed a small bowel obstruction with transition point in the RLQ of the abdomen, a suspected thrombus in the R femoral vein, bilateral inguinal hernias and a hiatal hernia. H&H was 11.8 and 35.2 at the time of presentation - which was close to her baseline. She was admitted for further workup and treatment of SBO. NGT was inserted. PPI Gtt was initiated. AC was held. H&H was monitored. GI was consulted. The patient underwent EGD on 2/26/21, which showed GI bleeding from gastritis. General surgery was consulted but SBO was managed conservatively with NGT and bowel rest. The patient had been started on IV rocephin for presumed UTI. UC resulted positive for pseudomonas. Abx were adjusted to cefepime for pseudomonal coverage.      3/5 COVID pending- needed in 48 hrs for facility to [facility] x2 wks before returning to normal area of [place]. Will likely d/c today or this weekend. Doing much butter.     Ok to go per GI. Still passing gas. Had BM. Low fiber diet. Drinking prune juice. Tolerating diet. H/H stable back on her eliquis. In summary:     Diet NPO Effective Now  PRN analgesia  No peritoneal signs  IV push for PPI - stop infusion  Treating UTI with IV antibx- will be done 3/3  General Surgery following. GI consulted.      Acute GI Bleed  Oral anticoagulation - chronically + filter in place   Transfuse as needed to maintain hgb > 7  Serial h & h  PPI gtt  IVF  GI following. Appreciate recs. - s/p EGD 2/26/21  -  Gastric biopsies:  Acute gastritis with ulceration and congestion, suggestive of ischemic   gastritis.    -  Negative for intestinal metaplasia, dysplasia or malignancy.   - Bleeding is likely coming from gastritis with oozing   Currently holding eliquis/ASA - awaiting restart per Gen Surg/GI  (likely when H/H stabilizes)   - restarted eliquis  - cont bid pantoprazole x1 month after discharge per GI     Urinary Tract Infection, POA - due to pseudomonas   - Likely 2/2 chronic indwelling urinary catheter  UA +. UC positive for pseudomonas. Cefepime x5 days- completed  Sensitivities reviewed.     Recent PE (12/20)   Hx femoral vein thrombosis and PE  - eliquis chronically - restart now that h/h stable ok per GI  Had IVC filter placed at that time due to concerns of fall risk - though was started on eliquis. Suspected R  Femoral thrombus on CT 2/26, however US doppler was negative 34/00   - Complication of IVC placement? Currently holding eliquis due to GIB     HTN  Continue medications as ordered  Monitor BP  Titrate medications as needed.     HLD  Statin  Monitor for S/S of Rhabdomyolysis      Parkinson's disease  Takes sinemet at home   Home meds reviewed and ordered.      Neurogenic bladder  Has chronic indwelling suprapubic catheter.   Catheter should be changed every 30 days     Hypokalemia  - added daily supplement        Exam:     /78   Pulse 80   Temp 97.7 °F (36.5 °C) (Oral)   Resp 16   Ht 4' 11\" (1.499 m)   Wt 148 lb 5.9 oz (67.3 kg)   SpO2 94%   BMI 29.97 kg/m²   General appearance: No apparent distress, appears stated age and cooperative. HEENT: Pupils equal, round, and reactive to light. Conjunctivae/corneas clear. Neck: Supple, with full range of motion. No jugular venous distention. Trachea midline. Respiratory:  Normal respiratory effort. Clear to auscultation, bilaterally without Rales/Wheezes/Rhonchi. Cardiovascular: Regular rate and rhythm with normal S1/S2 without murmurs, rubs or gallops. Abdomen: Soft, non-tender, non-distended with normal bowel sounds. Musculoskelatal: No clubbing, cyanosis or edema bilaterally. Full range of motion without deformity. Skin: Skin color, texture, turgor normal.  No rashes or lesions. Neurologic:  Neurovascularly intact without any focal sensory/motor deficits.  Cranial nerves: II-XII intact, grossly non-focal.  Psychiatric: Alert and oriented, thought content appropriate, normal insight      Consults:     IP CONSULT TO HOSPITALIST  IP CONSULT TO GENERAL SURGERY  IP CONSULT TO GENERAL SURGERY  IP CONSULT TO GI  IP CONSULT TO SPIRITUAL SERVICES    Diagnostic tests:    Imaging:  XR ABDOMEN (KUB) (SINGLE AP VIEW)   Final Result   NG tube tip and proximal side-port reside in the stomach. There are   gas-filled loops of large and small bowel suggesting an ileus. The distal   small bowel obstruction noted on CT from 1 day earlier is not perceived on   the conventional radiographs.           CT ABDOMEN PELVIS W IV CONTRAST Additional Contrast? None   Final Result   1. Small bowel obstruction with a transition point in the right lower   quadrant of the abdomen. 2. Gallstones without adjacent inflammatory changes. 3. Hiatal hernia. 4. Trace right pleural effusion. 5. Suspected thrombus in the right femoral vein. 6. Bilateral inguinal hernias. 7. Suprapubic catheter in the bladder lumen.           XR CHEST PORTABLE   Final Result   Low lung volumes with bibasilar atelectasis, left side greater than right. There may be trace bilateral pleural effusions.                 Labs:  For convenience and continuity at follow-up the following most recent labs are provided:      CBC:    Lab Results   Component Value Date    WBC 5.4 03/05/2021    HGB 9.1 03/05/2021    HCT 27.4 03/05/2021     03/05/2021       Renal:    Lab Results   Component Value Date     03/05/2021    K 3.5 03/05/2021     03/05/2021    CO2 28 03/05/2021    BUN 6 03/05/2021    CREATININE <0.5 03/05/2021    CALCIUM 9.0 03/05/2021       Discharge Medications:     Current Discharge Medication List           Details   pantoprazole (PROTONIX) 40 MG tablet Take 1 tablet by mouth 2 times daily (before meals)  Qty: 60 tablet, Refills: 0              Details   rivastigmine (EXELON) 1.5 MG capsule Take 1.5 mg by mouth 2 times daily      mirtazapine (REMERON) 15 MG tablet Take 7.5 mg by mouth nightly      dibucaine (NUPERCAINAL) 1 % ointment Apply topically 3 times daily as needed (Hemorrhoids) Apply dime size amount to external hemorrhoids      apixaban (ELIQUIS) 5 MG TABS tablet Take 5 mg by mouth 2 times daily      carbidopa-levodopa (SINEMET CR)  MG per extended release tablet Take 1 tablet by mouth nightly      Magnesium Oxide 400 MG CAPS Take 400 mg by mouth daily      Multiple Vitamins-Minerals (THERAPEUTIC MULTIVITAMIN-MINERALS) tablet Take 1 tablet by mouth daily      carbidopa-levodopa (SINEMET)  MG per tablet Take 2 tablets by mouth 5 times daily 0600, 0900, 1200, 1500, 1800      verapamil (VERELAN) 360 MG extended release capsule Take 360 mg by mouth every morning       loteprednol (LOTEMAX) 0.5 % ophthalmic suspension Place 1 drop into both eyes 2 times daily as needed (Eye irritation)       lisinopril (PRINIVIL;ZESTRIL) 5 MG tablet Take 5 mg by mouth daily      pravastatin (PRAVACHOL) 20 MG tablet Take 20 mg by mouth nightly       alendronate (FOSAMAX) 70 MG tablet Take 70 mg by mouth every 7 days Tuesdays      sertraline (ZOLOFT) 50 MG tablet Take 50 mg by mouth daily      CRANBERRY PO Take 800 mg by mouth daily       Probiotic Product (PROBIOTIC-10 PO) Take 1 tablet by mouth daily      vitamin D (CHOLECALCIFEROL) 25 MCG (1000 UT) TABS tablet Take 1,000 Units by mouth daily      Ascorbic Acid (VITAMIN C) 1000 MG tablet Take 1,000 mg by mouth Daily with lunch       aspirin (ASPIRIN 81) 81 MG EC tablet Take 81 mg by mouth daily       polyethylene glycol (GLYCOLAX) 17 g packet Take 17 g by mouth daily as needed for Constipation       acetaminophen (TYLENOL) 325 MG tablet Take 650 mg by mouth every 6 hours as needed for Pain      carboxymethylcellulose (REFRESH PLUS) 0.5 % SOLN ophthalmic solution Place 1 drop into both eyes 4 times daily as needed for Dry Eyes              Time Spent on discharge is more than 45 minutes in the examination, evaluation, counseling and review of medications and discharge plan.       Signed:    WILMAR Ulloa CNP   3/5/2021      Thank you David Duarte for the opportunity to be involved in this patient's care. If you have any questions or concerns please feel free to contact me at [phone].

## 2021-03-05 NOTE — PROGRESS NOTES
Physical Therapy    Facility/Department: [address] MED SURG  Daily Note  If patient discharges prior to next session this note will serve as a discharge summary. Please see below for the latest assessment towards goals. NAME: Dashawn Quinones  : 1935  MRN: [id number]    Date of Service: 3/5/2021    Discharge Recommendations:  Patient would benefit from continued therapy after discharge, 3-5 sessions per week   Dashawn Quinones scored a  on the AM-PAC short mobility form. Current research shows that an AM-PAC score of 17 or less is typically not associated with a discharge to the patient's home setting. Based on the patient's AM-PAC score and their current functional mobility deficits, it is recommended that the patient have 3-5 sessions per week of Physical Therapy at d/c to increase the patient's independence. Please see assessment section for further patient specific details. PT Equipment Recommendations  Equipment Needed: No  Other: defer to next level of care    Assessment   Assessment: Today, the pt showed slight improvement and was able to walk into the bathroom with the walker with min/mod A of 1. She is still requiring mod A for transfers and mod A for static standing. Anticipate that with con't skilled PT that she will con't to progress. Will con't to follow. Specific instructions for Next Treatment: cotx  Prognosis: Good  PT Education: PT Role;General Safety;Transfer Training;Gait Training  Barriers to Learning: none  REQUIRES PT FOLLOW UP: Yes  Activity Tolerance  Activity Tolerance: Patient Tolerated treatment well       Patient Diagnosis(es): The primary encounter diagnosis was Small bowel obstruction ([address]  Diagnoses of Acute upper GI bleed, Non-intractable vomiting with nausea, unspecified vomiting type, Periumbilical abdominal pain, Hiatal hernia, Gallstones, Bilateral inguinal hernia without obstruction or gangrene, recurrence not specified, and Femoral vein thrombosis, right ([address] were also pertinent to this visit. has a past medical history of Contraindication to anticoagulation therapy, Hiatal hernia, High blood pressure, Neurogenic bladder, Parkinson disease ([address] pulmonary embolism, and Suprapubic catheter ([address] has a past surgical history that includes Tonsillectomy and adenoidectomy (1943); Appendectomy (1948); Dilation and curettage of uterus (1959 60 62 63); Hysterectomy (1974); Vagina surgery (2006); Abdomen surgery; Cystoscopy (N/A, 12/03/2020); IVC filter insertion (Right, 12/17/2020); and Upper gastrointestinal endoscopy (N/A, 2/26/2021). Restrictions  Restrictions/Precautions  Restrictions/Precautions: Fall Risk  Position Activity Restriction  Other position/activity restrictions: SP-catheter, uses heel lift in shoes      Subjective  General  Chart Reviewed: Yes  Additional Pertinent Hx: Per H&P on 2- of Randy Bennett MD: The pt is an 83 yo female who came to the ED from her LTC facility with abd pain, nausea and vomiting. CT showed a SBO, suspected thrombus in R femoral vein, B inguinal hernias and hiatla hernia. The pt had an EGD on 2- and a bx was taken. SBO was conservatively managed with NGT and bowel rest. NG tube was removed 3-2-2021.       PMHx: HTN, neurogenic bladder, Parkinson's, PE, SP-catheter, IVC filter  Family / Caregiver Present: Yes(son)  Referring Practitioner: WILMAR Ferrera CNP  Subjective  Subjective: the pt was found to be up in the chair upon arrival; the pt agreeable to going back to bed and upon standing she requested to use the bathroom; did not report pain        Social/Functional History  Social/Functional History  Lives With: Alone  Type of Home: Assisted living([facility] apt, also has an apt in LTC section, in process of deciding which section pt will be returning)  Home Access: Elevator  Bathroom Shower/Tub: Walk-in shower, Shower chair with back  Bathroom Toilet: Handicap height  Bathroom Fair;-  Standing - Static: Fair;-(static standing at the walker)  Standing - Dynamic: Fair(with walker)  Comments: the pt required mod A of 1 for static standing at the walker for clean-up following BM, x 2 minutes, with cues to stand straight        Plan   Plan  Times per week: 3-5x/week  Specific instructions for Next Treatment: cotx  Current Treatment Recommendations: Transfer Training, Gait Training, Functional Mobility Training, Strengthening, ROM  Safety Devices  Type of devices: Call light within reach, Bed alarm in place, Gait belt, Patient at risk for falls, Left in bed, Nurse notified      AM-PAC Score  AM-PAC Inpatient Mobility Raw Score : 11 (03/05/21 1452)  AM-PAC Inpatient T-Scale Score : 33.86 (03/05/21 1452)  Mobility Inpatient CMS 0-100% Score: 72.57 (03/05/21 1452)  Mobility Inpatient CMS G-Code Modifier : CL (03/05/21 1452)          Goals  Short term goals  Time Frame for Short term goals: upon d/c  Short term goal 1: Bed mobility with min/mod A of 1.  (not met)  Short term goal 2: Transfers sit <> stand with min/mod A of 1. (met)  Short term goal 3: Ambulate with wheeled walker 25 feet with min/mod A of 1.  (met)  Patient Goals   Patient goals : none stated       Therapy Time   Individual Concurrent Group Co-treatment   Time In 1420         Time Out 1459         Minutes 39                 Electronically signed by Kristin Sun, PT [id number] on 3/5/2021 at 3:05 PM

## 2021-03-05 NOTE — PROGRESS NOTES
Hospital Medicine Progress Note      Admit Date: 2/26/2021       CC: F/U for SOB/UTI    HPI: The patient is a 82 yrs old female, who  has a past medical history of Contraindication to anticoagulation therapy, Hiatal hernia, High blood pressure, Neurogenic bladder, Parkinson disease ([address] pulmonary embolism, and Suprapubic catheter ([address]  The patient had recently been admitted for R upper and lower lobe PE (12/31). She was initially treated with heparin gtt and was transitioned to PO eliquis. Initially there was some concern for [facility] given that she is a fall risk and has an IVC filter. Ultimately she was discharged on eliquis. At the time, US of BLE was negative for DVT. She presented to [facility] ER on the date of admission with complaints of abd pain, nausea, vomiting, and coffee ground emesis. Furthermore - she was also constipated. CT A/P showed a small bowel obstruction with transition point in the RLQ of the abdomen, a suspected thrombus in the R femoral vein, bilateral inguinal hernias and a hiatal hernia. H&H was 11.8 and 35.2 at the time of presentation - which was close to her baseline. She was admitted for further workup and treatment of SBO. NGT was inserted. PPI Gtt was initiated. AC was held. H&H was monitored. GI was consulted. The patient underwent EGD on 2/26/21, which showed GI bleeding from gastritis. General surgery was consulted but SBO was managed conservatively with NGT and bowel rest. The patient had been started on IV rocephin for presumed UTI. UC resulted positive for pseudomonas. Abx were adjusted to cefepime for pseudomonal coverage.      Interval History/Subjective: COVID pending- needed in 48 hrs for facility to [facility] x2 wks before returning to normal area of [place]. Will likely d/c today or this weekend. Ok to go when diet has advanced enough per GI. Still passing gas. Had BM. Low fiber diet. Drinking prune juice. Tolerating diet.   H/H stable back on her eliquis. Review of Systems:       The patient denied headaches, visual changes, LOC, SOB, CP, ABD pain, N/V/D, skin changes, new or worsening weakness or neuromuscular deficits. Comprehensive ROS negative except as mentioned above. Past Medical History:        Diagnosis Date    Contraindication to anticoagulation therapy     fall risk    Hiatal hernia     High blood pressure     Neurogenic bladder     Parkinson disease ([facility] [address]     pulmonary embolism 12/13/2020    right upper and lower lobes    Suprapubic catheter [facility])        Past Surgical History:        Procedure Laterality Date    ABDOMEN SURGERY      bowel obstruction    APPENDECTOMY  1948    CYSTOSCOPY N/A 12/03/2020    CYSTOSCOPY,  WITH INTRAVESICAL INJECTION OF BOTOX 200UNITS, WITH SUPRAPUBIC TUBE EXCHANGE performed by Cheyanne Pruett MD at [id number] [place] [address]    IVC FILTER INSERTION Right 12/17/2020    Dr. Autumn Liu. Bessy retreivable IVC filter    TONSILLECTOMY AND ADENOIDECTOMY  1943    UPPER GASTROINTESTINAL ENDOSCOPY N/A 2/26/2021    EGD BIOPSY performed by Bailey Lopez MD at [facility]  2006    repair vagina bladder and rectum       Allergies:  Caduet [amlodipine-atorvastatin], Estrogens conjugated, Penicillins, and Terramycin [oxytetracycline-lidocaine]    Past medical and surgical history reviewed. Any changes have been noted. PHYSICAL EXAM:  BP (!) 145/77   Pulse 77   Temp 97.9 °F (36.6 °C) (Oral)   Resp 16   Ht 4' 11\" (1.499 m)   Wt 148 lb 5.9 oz (67.3 kg)   SpO2 92%   BMI 29.97 kg/m²       Intake/Output Summary (Last 24 hours) at 3/5/2021 0820  Last data filed at 3/5/2021 0455  Gross per 24 hour   Intake 900 ml   Output 1100 ml   Net -200 ml        General appearance:   No apparent distress, appears stated age. Cooperative. HEENT:  Normocephalic, atraumatic. PERRLA. EOMi.   Conjunctivae/corneas clear, no icterus, Additional Contrast? None   Final Result   1. Small bowel obstruction with a transition point in the right lower   quadrant of the abdomen. 2. Gallstones without adjacent inflammatory changes. 3. Hiatal hernia. 4. Trace right pleural effusion. 5. Suspected thrombus in the right femoral vein. 6. Bilateral inguinal hernias. 7. Suprapubic catheter in the bladder lumen. XR CHEST PORTABLE   Final Result   Low lung volumes with bibasilar atelectasis, left side greater than right. There may be trace bilateral pleural effusions. Scheduled and prn Medications:    Scheduled Meds:   potassium chloride  20 mEq Oral Daily with breakfast    polyethylene glycol  17 g Oral Daily    apixaban  5 mg Oral BID    esomeprazole  40 mg Intravenous QAM AC    mirtazapine  7.5 mg Oral Nightly    lisinopril  5 mg Oral Daily    pravastatin  20 mg Oral Nightly    sertraline  50 mg Oral Daily    lactobacillus  1 capsule Oral BID WC    carbidopa-levodopa  1 tablet Oral 5x Daily    verapamil  360 mg Oral QAM    rivastigmine  1.5 mg Oral BID    carbidopa-levodopa  1 tablet Oral Nightly    sodium chloride flush  10 mL Intravenous 2 times per day     Continuous Infusions:  PRN Meds:.potassium chloride **OR** potassium alternative oral replacement **OR** potassium chloride, polyvinyl alcohol-povidone, acetaminophen **OR** acetaminophen, sodium chloride flush, phenol, promethazine **OR** ondansetron    Assessment & Plan:        Diet NPO Effective Now  PRN analgesia  No peritoneal signs  IV push for PPI - stop infusion  Treating UTI with IV antibx- will be done 3/3  General Surgery following. GI consulted.      Acute GI Bleed  Oral anticoagulation - chronically + filter in place   Transfuse as needed to maintain hgb > 7  Serial h & h  PPI gtt  IVF  GI following. Appreciate recs. - s/p EGD 2/26/21  -  Gastric biopsies:  Acute gastritis with ulceration and congestion, suggestive of ischemic   gastritis.    -  Negative for intestinal metaplasia, dysplasia or malignancy.   - Bleeding is likely coming from gastritis with oozing   Currently holding eliquis/ASA - awaiting restart per Gen Surg/GI  (likely when H/H stabilizes)   - restarted eliquis  - cont bid pantoprazole x1 month after discharge per GI     Urinary Tract Infection, POA - due to pseudomonas   - Likely 2/2 chronic indwelling urinary catheter  UA +. UC positive for pseudomonas. Cefepime x5 days- completed  Sensitivities reviewed.     Recent PE (12/20)   Hx femoral vein thrombosis and PE  - eliquis chronically - restart now that h/h stable ok per GI  Had IVC filter placed at that time due to concerns of fall risk - though was started on eliquis. Suspected R  Femoral thrombus on CT 2/26, however US doppler was negative 77/54   - Complication of IVC placement? Currently holding eliquis due to GIB     HTN  Continue medications as ordered  Monitor BP  Titrate medications as needed.     HLD  Statin  Monitor for S/S of Rhabdomyolysis      Parkinson's disease  Takes sinemet at home   Home meds reviewed and ordered.      Neurogenic bladder  Has chronic indwelling suprapubic catheter.   Catheter should be changed every 30 days     Hypokalemia  - added daily supplement     Continue current regimen/therapies. Monitor. Adjust medical regimen as appropriate. Body mass index is 29.97 kg/m². The patient and / or the family were informed of the results of any tests, a time was given to answer questions, a plan was proposed and they agreed with plan.       DVT ppx: filter, eliquis  GI ppx: nexium     Diet: Dietary Nutrition Supplements: Standard High Calorie Oral Supplement  DIET LOW FIBER;    Consults:  IP CONSULT TO HOSPITALIST  IP CONSULT TO GENERAL SURGERY  IP CONSULT TO GENERAL SURGERY  IP CONSULT TO GI  IP CONSULT TO SPIRITUAL SERVICES    DISPO/placement plan: back [place] [facility] if able in next couple days if continues to improve    Code Status: DNR-CCA      WILMAR Aguilar - CNP  03/05/21

## 2021-03-05 NOTE — CARE COORDINATION
Informed at rounds, possible dc for today if GI agrees. Transport arranged for 730 via [facility]. Reports [phone]  Fax [phone]  SW met with pt who reports she doesn't feel well after eating. Feels maybe ready for dc later today or tomorrow.   SW following for dc order, pt and family will need to notified of dc and transport  Electronically signed by [id number] Torres on 3/5/2021 at 11:48 AM

## 2021-03-05 NOTE — CARE COORDINATION
Discharge Plan:  Patient discharge returning to [facility]  SHAQ/AVS in [facility]  Gerald called report to [phone] transport with [address],  time 7:30pm  Daughter Gurpreet Liz  advised of discharge and in agreement.   HENS not needed  Electronically signed by SERGIO Baez on 3/5/2021 at 2:54 PM   [phone]

## 2021-03-05 NOTE — CARE COORDINATION
SW spoke with Leonides Meckel at [facility] asking to have precert started for SNF at dc. Pt appears to be getting closer for dc. Will need COVID test within 48 hr of dc. Last test 2/26.     Electronically signed by [id number] SERGIO Patel on 3/5/2021 at 10:27 AM

## 2021-03-05 NOTE — PLAN OF CARE
Problem: SAFETY  Goal: Free from accidental physical injury  3/5/2021 1106 by Larry Gutierrez RN  Outcome: Ongoing  3/4/2021 2338 by Bairon Cain RN  Outcome: Ongoing  Goal: Free from intentional harm  3/5/2021 1106 by Larry Gutierrez RN  Outcome: Ongoing  3/4/2021 2338 by Bairon Cain RN  Outcome: Ongoing     Problem: DAILY CARE  Goal: Daily care needs are met  3/5/2021 1106 by Larry Gutierrez RN  Outcome: Ongoing  3/4/2021 2338 by Bairon Cain RN  Outcome: Ongoing     Problem: PAIN  Goal: Patient's pain/discomfort is manageable  3/5/2021 1106 by Larry Gutierrez RN  Outcome: Ongoing  3/4/2021 2338 by Bairon Cain RN  Outcome: Ongoing     Problem: SKIN INTEGRITY  Goal: Skin integrity is maintained or improved  3/5/2021 1106 by Larry Gutierrez RN  Outcome: Ongoing  3/4/2021 2338 by Bairon Cain RN  Outcome: Ongoing     Problem: KNOWLEDGE DEFICIT  Goal: Patient/S.O. demonstrates understanding of disease process, treatment plan, medications, and discharge instructions.   3/5/2021 1106 by Larry Gutierrez RN  Outcome: Ongoing  3/4/2021 2338 by Bairon Cain RN  Outcome: Ongoing     Problem: DISCHARGE BARRIERS  Goal: Patient's continuum of care needs are met  3/5/2021 1106 by Larry Gutierrez RN  Outcome: Ongoing  3/4/2021 2338 by Bairon Cain RN  Outcome: Ongoing     Problem: Falls - Risk of:  Goal: Will remain free from falls  Description: Will remain free from falls  3/5/2021 1106 by Larry Gutierrez RN  Outcome: Ongoing  3/4/2021 2338 by Bairon Cain RN  Outcome: Ongoing  Goal: Absence of physical injury  Description: Absence of physical injury  3/5/2021 1106 by Larry Gutierrez RN  Outcome: Ongoing  3/4/2021 2338 by Bairon Cain RN  Outcome: Ongoing     Problem: Skin Integrity:  Goal: Will show no infection signs and symptoms  Description: Will show no infection signs and symptoms  3/5/2021 1106 by Larry Gutierrez RN  Outcome: Ongoing  3/4/2021 2338 by Bairon Cain RN  Outcome: Ongoing  Goal: Absence

## 2021-03-05 NOTE — PROGRESS NOTES
Pt discharging to [facility]. Attempted to call report, no answer. Transport scheduled for 7:30 P. M.

## 2021-03-05 NOTE — PROGRESS NOTES
General and Vascular Surgery                                                           Daily Progress Note                                                                  Pt Name: Ksenia Reed  Medical Record Number: [id number]  Date of Birth 1935   Today's Date: 3/5/2021      ASSESSMENT/PLAN  Resolved ileus 2/2 UTI  -passing flatus, BM this AM  -Tolerated low fiber breakfast and eating lunch now.   -completed 5 days meropenem for pseudomonas UTI  -UGI bleed - gastritis on EGD  -H/H stable, back on eliquis  OK to DC from surgery standpoint    Roxy Correa is improving. Passing flatus, had BM this AM. Sitting up to chair eating lunch. OBJECTIVE  VITALS:  height is 4' 11\" (1.499 m) and weight is 148 lb 5.9 oz (67.3 kg). Her oral temperature is 97.7 °F (36.5 °C). Her blood pressure is 138/78 and her pulse is 80. Her respiration is 16 and oxygen saturation is 94%. VITALS:  /78   Pulse 80   Temp 97.7 °F (36.5 °C) (Oral)   Resp 16   Ht 4' 11\" (1.499 m)   Wt 148 lb 5.9 oz (67.3 kg)   SpO2 94%   BMI 29.97 kg/m²   GENERAL: alert, no distress  ABDOMEN: soft nontender nondistended  I/O last 3 completed shifts: In: 900 [P.O.:900]  Out: 1100 [Urine:1100]  I/O this shift:   In: 480 [P.O.:480]  Out: 700 [Urine:700]    LABS  Recent Labs     03/05/21  0538   WBC 5.4   HGB 9.1*   HCT 27.4*         K 3.5      CO2 28   BUN 6*   CREATININE <0.5*   MG 2.00   CALCIUM 9.0     CBC:   Lab Results   Component Value Date    WBC 5.4 03/05/2021    RBC 3.18 03/05/2021    HGB 9.1 03/05/2021    HCT 27.4 03/05/2021    MCV 86.2 03/05/2021    MCH 28.8 03/05/2021    MCHC 33.4 03/05/2021    RDW 15.7 03/05/2021     03/05/2021    MPV 6.8 03/05/2021     CMP:    Lab Results   Component Value Date     03/05/2021    K 3.5 03/05/2021     03/05/2021    CO2 28 03/05/2021    BUN 6 03/05/2021    CREATININE <0.5 03/05/2021    GFRAA >60 03/05/2021    AGRATIO 1.0 02/26/2021    LABGLOM >60 03/05/2021    GLUCOSE 112 03/05/2021    PROT 7.2 02/26/2021    LABALBU 3.6 02/26/2021    CALCIUM 9.0 03/05/2021    BILITOT <0.2 02/26/2021    ALKPHOS 102 02/26/2021    AST 19 02/26/2021    ALT 17 02/26/2021           Electronically signed by WILMAR Mendes CNP on 3/5/2021 at 1:48 PM    As above  Feels better overall  Still some bloating at times but tolerating PO  OK for discharge from my standpoint  No need for surgical follow up unless symptoms return    Electronically signed by oDris Gannon MD on 3/5/2021 at 4:14 PM

## 2021-03-05 NOTE — PROGRESS NOTES
pain, nausea, vomiting, and coffee ground emesis. Furthermore - she was also constipated. CT A/P showed a small bowel obstruction with transition point in the RLQ of the abdomen, a suspected thrombus in the R femoral vein, bilateral inguinal hernias and a hiatal hernia. H&H was 11.8 and 35.2 at the time of presentation - which was close to her baseline. She was admitted for further workup and treatment of SBO. NGT was inserted. PPI Gtt was initiated. AC was held. H&H was monitored. GI was consulted. The patient underwent EGD on 2/26/21, which showed GI bleeding from gastritis. General surgery was consulted but SBO was managed conservatively with NGT and bowel rest. The patient had been started on IV rocephin for presumed UTI. UC resulted positive for pseudomonas. Abx were adjusted to cefepime for pseudomonal coverage. \"  Family / Caregiver Present: Yes(sister)  Referring Practitioner: WILMAR Ferrera CNP  Diagnosis: Small Bowel Obstruction vs. Ileus 2/2 UTI, UGI bleed  Subjective  Subjective: Pt met b/s for OT cotx with PT. Pt seated in recliner on arrival, agreeable to participate in therapy. Pt reports she feels like she needs to have BM. Pt denies pain. Orientation  Orientation  Overall Orientation Status: Within Functional Limits  Objective    ADL  Toileting: Dependent/Total(pt incontinent of BM in briefs and voided more BM at toilet. Total A of OT to manage briefs/hygiene while PT provided mod A for balance)     Balance  Sitting Balance: Stand by assistance(seated on toilet with R lean on toilet, bracing herself with grab bar)  Standing Balance: Moderate assistance(at walker with min/mod A)  Standing Balance  Time: ~2 minutes  Activity: standing stance at walker for pericare  Comment: Mod A for balance  Functional Mobility  Functional - Mobility Device: Rolling Walker  Activity: To/from bathroom  Assist Level:  Moderate assistance  Functional Mobility Comments: Pt completed fxl mobility ~20 ft, ~10 ft with Min A initially progressing to mod A as pt fatigued, increased assist around turns. Bed mobility  Rolling to Left: Minimal assistance  Supine to Sit: Unable to assess  Sit to Supine: Maximum assistance;2 Person assistance  Scooting: Dependent/Total;2 Person assistance     Transfers  Sit to stand: Moderate assistance  Stand to sit: Moderate assistance  Transfer Comments: to/from RW, verbal cues for hand placement   Toilet Transfers  Toilet - Technique: Ambulating  Equipment Used: Grab bars  Toilet Transfer: Moderate assistance    Cognition  Overall Cognitive Status: WFL  Arousal/Alertness: Appropriate responses to stimuli  Following Commands: Follows one step commands consistently  Attention Span: Appears intact  Problem Solving: Decreased awareness of errors;Assistance required to identify errors made;Assistance required to generate solutions  Insights: Decreased awareness of deficits  Sequencing: Requires cues for some        Plan   Plan  Times per week: 3-5  Times per day: Daily  Current Treatment Recommendations: Balance Training, Functional Mobility Training, Strengthening, ROM, Neuromuscular Re-education, Endurance Training, Safety Education & Training, Self-Care / ADL    AM-[facility] Score        AM-[facility] Inpatient Daily Activity Raw Score: 14 (03/05/21 1459)  AM-[facility] Inpatient ADL T-Scale Score : 33.39 (03/05/21 1459)  ADL Inpatient CMS 0-100% Score: 59.67 (03/05/21 1459)  ADL Inpatient CMS G-Code Modifier : CK (03/05/21 1459)    Goals  Short term goals  Time Frame for Short term goals: Prior to d/c: STATUS GOALS 3/5: ALL GOALS ONGOING  Short term goal 1: Pt will complete UB bathing/dressing with SBA. Short term goal 2: Pt will complete LB bathing/dressing with mod A. Short term goal 3: Pt will complete toileting with mod A. Short term goal 4: Pt will complete grooming in stance at sink with SBA/CGA.   Short term goal 5: Pt will complete fxl mobility and fxl transfers to/from ADL surfaces with CGA/min A using AD. Long term goals  Time Frame for Long term goals : STG=LTG  Patient Goals   Patient goals : none stated.        Therapy Time   Individual Concurrent Group Co-treatment   Time In [id number]         Time Out 1459         Minutes Jc Prather OTR/NORA 2070

## 2021-03-06 NOTE — PLAN OF CARE
Ongoing  Goal: Absence of new skin breakdown  Description: Absence of new skin breakdown  3/5/2021 2024 by Julio Nguyen RN  Outcome: Completed  3/5/2021 1106 by Prashanth Shannon RN  Outcome: Ongoing     Problem: Nutrition  Goal: Optimal nutrition therapy  3/5/2021 2024 by Julio Nguyen RN  Outcome: Completed  3/5/2021 1106 by Prashanth Shannon RN  Outcome: Ongoing

## 2022-03-16 ENCOUNTER — OFFICE VISIT (OUTPATIENT)
Dept: PULMONOLOGY | Age: 87
End: 2022-03-16
Payer: COMMERCIAL

## 2022-03-16 ENCOUNTER — HOSPITAL ENCOUNTER (OUTPATIENT)
Age: 87
Discharge: HOME | End: 2022-03-16
Payer: COMMERCIAL

## 2022-03-16 ENCOUNTER — HOSPITAL ENCOUNTER (OUTPATIENT)
Dept: GENERAL RADIOLOGY | Age: 87
Discharge: HOME | End: 2022-03-16
Payer: COMMERCIAL

## 2022-03-16 VITALS
DIASTOLIC BLOOD PRESSURE: 80 MMHG | TEMPERATURE: 98.5 F | HEIGHT: 59 IN | RESPIRATION RATE: 16 BRPM | OXYGEN SATURATION: 90 % | SYSTOLIC BLOOD PRESSURE: 128 MMHG | BODY MASS INDEX: 29.97 KG/M2 | HEART RATE: 80 BPM

## 2022-03-16 DIAGNOSIS — I26.99 ACUTE PULMONARY EMBOLISM WITHOUT ACUTE COR PULMONALE, UNSPECIFIED PULMONARY EMBOLISM TYPE (HCC): ICD-10-CM

## 2022-03-16 DIAGNOSIS — R06.02 SOB (SHORTNESS OF BREATH): Primary | ICD-10-CM

## 2022-03-16 DIAGNOSIS — R06.02 SOB (SHORTNESS OF BREATH): ICD-10-CM

## 2022-03-16 PROCEDURE — G8417 CALC BMI ABV UP PARAM F/U: HCPCS | Performed by: INTERNAL MEDICINE

## 2022-03-16 PROCEDURE — 1036F TOBACCO NON-USER: CPT | Performed by: INTERNAL MEDICINE

## 2022-03-16 PROCEDURE — 1123F ACP DISCUSS/DSCN MKR DOCD: CPT | Performed by: INTERNAL MEDICINE

## 2022-03-16 PROCEDURE — 4040F PNEUMOC VAC/ADMIN/RCVD: CPT | Performed by: INTERNAL MEDICINE

## 2022-03-16 PROCEDURE — G8484 FLU IMMUNIZE NO ADMIN: HCPCS | Performed by: INTERNAL MEDICINE

## 2022-03-16 PROCEDURE — G8400 PT W/DXA NO RESULTS DOC: HCPCS | Performed by: INTERNAL MEDICINE

## 2022-03-16 PROCEDURE — 71046 X-RAY EXAM CHEST 2 VIEWS: CPT

## 2022-03-16 PROCEDURE — 99204 OFFICE O/P NEW MOD 45 MIN: CPT | Performed by: INTERNAL MEDICINE

## 2022-03-16 PROCEDURE — 1090F PRES/ABSN URINE INCON ASSESS: CPT | Performed by: INTERNAL MEDICINE

## 2022-03-16 PROCEDURE — G8427 DOCREV CUR MEDS BY ELIG CLIN: HCPCS | Performed by: INTERNAL MEDICINE

## 2022-03-16 RX ORDER — ALBUTEROL SULFATE 90 UG/1
2 AEROSOL, METERED RESPIRATORY (INHALATION) 4 TIMES DAILY PRN
Qty: 18 G | Refills: 11 | Status: SHIPPED | OUTPATIENT
Start: 2022-03-16

## 2022-03-16 ASSESSMENT — ENCOUNTER SYMPTOMS: SHORTNESS OF BREATH: 1

## 2022-03-16 NOTE — PROGRESS NOTES
[address], SLEEP, [address]  [facility] (:  1935) is a 82 y.o. female,Established patient, here for evaluation of the following chief complaint(s):  New Patient and Shortness of Breath         ASSESSMENT/PLAN:  1. SOB (shortness of breath)  Assessment & Plan:  Seems intermittent but progressive  -Never smoker, normal echo in   -Trial albuterol, provided spacer  -Needs full PFTs  - chest x-ray today  -6-minute walk test  Orders:  -     Full PFT Study With Bronchodilator; Future  -     XR CHEST (2 VW); Future  -     6 Minute Walk Test; Future  -     albuterol sulfate HFA (VENTOLIN HFA) 108 (90 Base) MCG/ACT inhaler; Inhale 2 puffs into the lungs 4 times daily as needed for Wheezing or Shortness of Breath, Disp-18 g, R-11Print  2. Acute pulmonary embolism without acute cor pulmonale, unspecified pulmonary embolism type [facility])  Assessment & Plan:  -Admitted with acute PE, failed Eliquis due to GI bleed  -Per report do not finish anticoagulation due to bleeding risk      No follow-ups on file. Future Appointments   Date Time Provider Luiza Brown   5/3/2022  1:40 PM [facility]            Subjective   SUBJECTIVE/OBJECTIVE:  28-year-old lady never smoker presents for evaluation of shortness of breath x6 months. Mild infrequent nonproductive cough. Denies associated chest pain, diaphoresis, though she does get hot flashes. Lives in skilled nursing facility and states that she has a home pulse oximeter and sometimes drops into the 70s and 80s. She has been set up with oxygen as needed there. She has never had a pulmonary work-up denies history of asthma or COPD. No family history of asthma. Has never tried inhaler. Review of Systems   Constitutional: Negative. Respiratory: Positive for shortness of breath. Cardiovascular: Positive for leg swelling. Musculoskeletal: Negative. Psychiatric/Behavioral: Negative.            Objective Physical Exam     Gen:  No acute distress. Eyes: PERRL. EOMI. Anicteric sclera. No conjunctival injection. ENT: No discharge. oropharynx clear. External appearance of ears and nose normal.  Neck: Trachea midline. No mass   Resp:  No crackles. No wheezes. No rhonchi. No dullness on percussion. CV: Regular rate. Regular rhythm. No murmur or rub. 1+ bilateral lower extremity edema. GI: Soft, Non-tender. Non-distended. +BS  Skin: Warm, dry, w/o erythema. Lymph: No cervical or supraclavicular LAD. M/S: No cyanosis. No clubbing. Neuro:  no focal neurologic deficit. Moves all extremities  Psych: Awake and alert, Oriented x 3. Judgement and insight appropriate. Mood stable. CT chest images reviewed personally by me, interpretation as follows:  -Right upper and lower lobe PE. Bibasilar atelectasis with a small right-sided pleural effusion. There is background mild mosaic attenuation noted in the apices    Echocardiogram, LVEF 80%, grade 1 diastolic dysfunction. An electronic signature was used to authenticate this note.     --Dashawn Vergara MD

## 2022-03-16 NOTE — ASSESSMENT & PLAN NOTE
Seems intermittent but progressive  -Never smoker, normal echo in 2020  -Trial albuterol, provided spacer  -Needs full PFTs  - chest x-ray today  -6-minute walk test

## 2022-03-16 NOTE — ASSESSMENT & PLAN NOTE
-Admitted with acute PE, failed Eliquis due to GI bleed  -Per report do not finish anticoagulation due to bleeding risk

## 2022-04-05 ENCOUNTER — HOSPITAL ENCOUNTER (OUTPATIENT)
Dept: PULMONOLOGY | Age: 87
Discharge: HOME | End: 2022-04-05
Payer: COMMERCIAL

## 2022-04-05 ENCOUNTER — HOSPITAL ENCOUNTER (OUTPATIENT)
Dept: CARDIAC REHAB | Age: 87
Setting detail: THERAPIES SERIES
Discharge: HOME | End: 2022-04-05
Payer: COMMERCIAL

## 2022-04-05 DIAGNOSIS — R06.02 SOB (SHORTNESS OF BREATH): ICD-10-CM

## 2022-04-05 LAB
DLCO %PRED: 83 %
DLCO PRED: NORMAL
DLCO/VA %PRED: NORMAL
DLCO/VA PRED: NORMAL
DLCO/VA: NORMAL
DLCO: NORMAL
EXPIRATORY TIME-POST: NORMAL
EXPIRATORY TIME: NORMAL
FEF 25-75% %CHNG: NORMAL
FEF 25-75% %PRED-POST: NORMAL
FEF 25-75% %PRED-PRE: NORMAL
FEF 25-75% PRED: NORMAL
FEF 25-75%-POST: NORMAL
FEF 25-75%-PRE: NORMAL
FEV1 %PRED-POST: 63 %
FEV1 %PRED-PRE: 56 %
FEV1 PRED: NORMAL
FEV1-POST: NORMAL
FEV1-PRE: NORMAL
FEV1/FVC %PRED-POST: NORMAL
FEV1/FVC %PRED-PRE: NORMAL
FEV1/FVC PRED: NORMAL
FEV1/FVC-POST: 103 %
FEV1/FVC-PRE: 100 %
FVC %PRED-POST: NORMAL
FVC %PRED-PRE: NORMAL
FVC PRED: NORMAL
FVC-POST: NORMAL
FVC-PRE: NORMAL
GAW %PRED: NORMAL
GAW PRED: NORMAL
GAW: NORMAL
IC %PRED: NORMAL
IC PRED: NORMAL
IC: NORMAL
MEP: NORMAL
MIP: NORMAL
MVV %PRED-PRE: NORMAL
MVV PRED: NORMAL
MVV-PRE: NORMAL
PEF %PRED-POST: NORMAL
PEF %PRED-PRE: NORMAL
PEF PRED: NORMAL
PEF%CHNG: NORMAL
PEF-POST: NORMAL
PEF-PRE: NORMAL
RAW %PRED: NORMAL
RAW PRED: NORMAL
RAW: NORMAL
RV %PRED: NORMAL
RV PRED: NORMAL
RV: NORMAL
SVC %PRED: NORMAL
SVC PRED: NORMAL
SVC: NORMAL
TLC %PRED: 61 %
TLC PRED: NORMAL
TLC: NORMAL
VA %PRED: NORMAL
VA PRED: NORMAL
VA: NORMAL
VTG %PRED: NORMAL
VTG PRED: NORMAL
VTG: NORMAL

## 2022-04-05 PROCEDURE — 6370000000 HC RX 637 (ALT 250 FOR IP): Performed by: INTERNAL MEDICINE

## 2022-04-05 PROCEDURE — 94727 GAS DIL/WSHOT DETER LNG VOL: CPT

## 2022-04-05 PROCEDURE — 94060 EVALUATION OF WHEEZING: CPT

## 2022-04-05 PROCEDURE — 94618 PULMONARY STRESS TESTING: CPT

## 2022-04-05 PROCEDURE — 94618 PULMONARY STRESS TESTING: CPT | Performed by: INTERNAL MEDICINE

## 2022-04-05 PROCEDURE — 94640 AIRWAY INHALATION TREATMENT: CPT

## 2022-04-05 PROCEDURE — 94729 DIFFUSING CAPACITY: CPT

## 2022-04-05 RX ORDER — ALBUTEROL SULFATE 90 UG/1
4 AEROSOL, METERED RESPIRATORY (INHALATION) ONCE
Status: COMPLETED | OUTPATIENT
Start: 2022-04-05 | End: 2022-04-05

## 2022-04-05 RX ADMIN — ALBUTEROL SULFATE 4 PUFF: 90 AEROSOL, METERED RESPIRATORY (INHALATION) at 13:49

## 2022-04-05 ASSESSMENT — PULMONARY FUNCTION TESTS
FEV1/FVC_PRE: 100
FEV1_PERCENT_PREDICTED_POST: 63
FEV1_PERCENT_PREDICTED_PRE: 56
FEV1/FVC_POST: 103

## 2022-04-05 NOTE — PROCEDURES
[facility] Cardiopulmonary Rehabilitation   Six Minute Walk Test    Elicia Sanchez Linden  YOB: 1935  Account Number: [de-identified]  AGE: 82 y.o.     OP test [x]   Pulmonary Rehab PRE []  POST   []    Diagnosis: Shortness of breath      Referring Physician: Dr. Silvia Saavedra    Gender []  male [x] female AGE: 82     Height: 4 ft 11 in 150 cm    Weight: 168 lbs         76 kg  Blood Pressure 114/66    Medications affecting heart rate: Albuterol inhaler 2 puffs 4x daily prn      Supplemental O2 during the test []  no [x] yes 2 lpm     [x] continuous []  intermittent    Device : [x] NC []  HFNC    []  oximizer  [] mask      Laps completed: 2 (1 lap = 100 ft)        Baseline (resting) Walking End of Test (recovery)      Heart Rate 75   88  79    BP  114/66   126/70  110/70    Dyspnea 1   4  2 (Milton scale)    Fatigue 5   7  5 (Milton scale)    SpO2  93%   86%    SpO2             96% 2L  95% 2L           98% 2L         Stopped or paused before 6 mins? []  no [x] yes How long? 1 minute and 54 seconds    Symptoms at end of exercise:[] angina  [] dizziness  []  hip, leg, calf, back pain       [x]  no complaints []  Other; She used wheeled walker and followed behind with wheelchair, needed 2 people assistance for safety. Number of laps 2 (x 30.48 meters)= 61 meters + final partial lap: 18 meters    Total distance walked in 6 mins: 79 meters    Predicted distance: 324 meters  Percent predicted: 24%              [] normal       [x] reduced     Summary: This is an outpatient 6 minute walk test, performed on supplemental oxygen. The patient ambulated 79 meters which is abnormal- 24-% predicted. Her, heart rate response was normal, the blood pressure response was normal, oxygen saturations were abnormal as she desaturated while ambulating on room air. The patient desaturated to 86% while ambulating on room air, and was placed on 2 L of oxygen to keep saturations above 90%.    The degree of symptoms based on the Milton Dyspnea/Fatigue scale were increased with testing. This test does indicate the need for supplemental oxygen.             Jose Reyes DO  Pulmonary Rehab Director

## 2022-04-05 NOTE — PROGRESS NOTES
[facility] Cardiopulmonary Rehabilitation    Qualifying Data for Home Oxygen        Resting Oxygen Saturation on Room Air:   93%    Exercise Oxygen Saturation on Room Air:  86%      Resting Oxygen Saturation on Oxygen:  98% 2L      Exercise Oxygen Saturation on Oxygen: 95% 2L

## 2022-04-06 PROCEDURE — 94727 GAS DIL/WSHOT DETER LNG VOL: CPT | Performed by: INTERNAL MEDICINE

## 2022-04-06 PROCEDURE — 94060 EVALUATION OF WHEEZING: CPT | Performed by: INTERNAL MEDICINE

## 2022-04-06 PROCEDURE — 94729 DIFFUSING CAPACITY: CPT | Performed by: INTERNAL MEDICINE

## 2022-04-06 NOTE — PROCEDURES
Reason for PFT:  SOB    Spirometry  1. There is no demonstrable obstructive defect. 2. The FVC is diminished suggesting a possible restrictive defect; suggest lung volumes if clinically indicated. Lung Volumes  3. Lung volumes show moderate restrictive defect. Bronchodilator  1. There is a non-significant response to bronchodilator. Flow-Volume Loop  4. The flow-volume loop is compatible with an obstructive process. Diffusing Capacity  5. Diffusing capacity is normal.      Overall Interpretation  No obvious obstruction is present    Moderate restriction restriction is present    There is a non-statistically significant bronchodilator response present    Diffusion capacity is normal (one attempt)    FEV1 is 0.87 L at 56% predicted. FVC is 1.13 L at 56% predicted    FEV1/FVC ratio is 77    Moderate restriction is present without obvious obstruction. Non-statistically significant bronchodilator response is present. Normal diffusing capacity (only one attempt). Restriction may be extra-thoracic vs neuromuscular due to normal diffusing capacity. Correlate with imaging. Nitrogen washout was performed for lung volumes. OBSTRUCTION % Predicted FEV1   MILD >70%   MODERATE 60-69%   MODERATELY-SEVERE 50-59%   SEVERE 35-49%   VERY SEVERE <35%         RESTRICTION % Predicted TLC   MILD Less than LLN but > than 70%   MODERATE 60-69%   MODERATELY-SEVERE <60%                 DIFFUSION CAPACITY DL,CO % Pred   MILD >60% AND < LLN   MODERATE 40-60%   SEVERE <40%       PFT data will be scanned into the media tab in epic.     Rosario Davidson [facility] Pulmonology

## 2022-04-07 ENCOUNTER — TELEPHONE (OUTPATIENT)
Dept: PULMONOLOGY | Age: 87
End: 2022-04-07

## 2022-04-07 NOTE — TELEPHONE ENCOUNTER
Message left for patient to call regarding need for O2 per her 6 min walk.   Will need to choose a DME

## 2022-04-08 ENCOUNTER — TELEPHONE (OUTPATIENT)
Dept: PULMONOLOGY | Age: 87
End: 2022-04-08

## 2022-04-08 NOTE — TELEPHONE ENCOUNTER
Pt called in to have noted on her acct that she is on 2 liters on continuous oxygen from her PCP Jade Robertson.  She says when she takes it off she goes down to 80% sat

## 2022-04-08 NOTE — TELEPHONE ENCOUNTER
Pt states she is a [facility] and they supply her O2 and they have changed her O2 to 2 already

## 2022-05-03 ENCOUNTER — OFFICE VISIT (OUTPATIENT)
Dept: PULMONOLOGY | Age: 87
End: 2022-05-03
Payer: COMMERCIAL

## 2022-05-03 VITALS
DIASTOLIC BLOOD PRESSURE: 70 MMHG | OXYGEN SATURATION: 97 % | SYSTOLIC BLOOD PRESSURE: 122 MMHG | HEIGHT: 59 IN | RESPIRATION RATE: 16 BRPM | BODY MASS INDEX: 29.97 KG/M2 | TEMPERATURE: 97 F | HEART RATE: 67 BPM

## 2022-05-03 DIAGNOSIS — J96.11 CHRONIC HYPOXEMIC RESPIRATORY FAILURE (HCC): ICD-10-CM

## 2022-05-03 DIAGNOSIS — G47.33 OSA (OBSTRUCTIVE SLEEP APNEA): Primary | ICD-10-CM

## 2022-05-03 PROCEDURE — G8427 DOCREV CUR MEDS BY ELIG CLIN: HCPCS | Performed by: INTERNAL MEDICINE

## 2022-05-03 PROCEDURE — 1090F PRES/ABSN URINE INCON ASSESS: CPT | Performed by: INTERNAL MEDICINE

## 2022-05-03 PROCEDURE — G8400 PT W/DXA NO RESULTS DOC: HCPCS | Performed by: INTERNAL MEDICINE

## 2022-05-03 PROCEDURE — 99213 OFFICE O/P EST LOW 20 MIN: CPT | Performed by: INTERNAL MEDICINE

## 2022-05-03 PROCEDURE — G8417 CALC BMI ABV UP PARAM F/U: HCPCS | Performed by: INTERNAL MEDICINE

## 2022-05-03 PROCEDURE — 1036F TOBACCO NON-USER: CPT | Performed by: INTERNAL MEDICINE

## 2022-05-03 PROCEDURE — 4040F PNEUMOC VAC/ADMIN/RCVD: CPT | Performed by: INTERNAL MEDICINE

## 2022-05-03 PROCEDURE — 1123F ACP DISCUSS/DSCN MKR DOCD: CPT | Performed by: INTERNAL MEDICINE

## 2022-05-03 ASSESSMENT — ENCOUNTER SYMPTOMS: SHORTNESS OF BREATH: 1

## 2022-05-03 NOTE — PROGRESS NOTES
[address] [place], SLEEP, AND CRITICAL CARE    Emelie Hodgkins (:  1935) is a 82 y.o. female,Established patient, here for evaluation of the following chief complaint(s):  Shortness of Breath         ASSESSMENT/PLAN:  1. STAR (obstructive sleep apnea)  Assessment & Plan:  - We will check home sleep study  -Reflex to [employer] follow-up in 90 days  -Discussed with patient she will need to wear for greater than 4 hours a night or for insurance coverage, ideally should wear for the entire duration of sleep. Orders:  -     Home Sleep Study; Future  2. Chronic hypoxemic respiratory failure [facility])  Assessment & Plan:  Emelie Hodgkins continues to use and benefit from supplemental oxygen.  -Unrevealing pulmonary work-up thus far. She does have some mild restrictive process but no evidence of interstitial lung disease at this time.  -She does have a history of undertreated pulmonary embolism due to GI bleed      Return in about 3 months (around 8/3/2022). Future Appointments   Date Time Provider Luiza Brown   8/3/2022  1:20 PM Ken Rogers MD [facility] P[facility]            Subjective   SUBJECTIVE/OBJECTIVE:  86 all presents for follow-up of shortness of breath and hypoxia.  -Since last visit PFTs that showed mild restrictive defect  -6-minute walk test showed ambulatory desaturation to 86%, she does not need oxygen at rest.  -She is concerned about sleep apnea as she has very fragmented sleep, multiple nocturnal awakenings as well as nocturnal hypoxemia. She has a strong family history of obstructive sleep apnea as well      Review of Systems   Constitutional: Negative. Respiratory: Positive for shortness of breath. Cardiovascular: Positive for leg swelling. Musculoskeletal: Negative. Psychiatric/Behavioral: Negative. Objective   Physical Exam     Gen:  No acute distress. Eyes: PERRL. EOMI. Anicteric sclera. No conjunctival injection. ENT: No discharge. oropharynx clear. External appearance of ears and nose normal.  Neck: Trachea midline. No mass   Resp:  No crackles. No wheezes. No rhonchi. No dullness on percussion. CV: Regular rate. Regular rhythm. No murmur or rub. 1+ bilateral lower extremity edema. GI: Soft, Non-tender. Non-distended. +BS  Skin: Warm, dry, w/o erythema. Lymph: No cervical or supraclavicular LAD. M/S: No cyanosis. No clubbing. Neuro:  no focal neurologic deficit. Moves all extremities  Psych: Awake and alert, Oriented x 3. Judgement and insight appropriate. Mood stable. Echocardiogram, LVEF 10%, grade 1 diastolic dysfunction. I spent 21 minutes on this case today, >50% was face-to-face in discussion of the diagnosis and the coordination of care in regards to the treatment plan. All questions answered. An electronic signature was used to authenticate this note.       --Diamond Groves MD

## 2022-05-03 NOTE — ASSESSMENT & PLAN NOTE
- We will check home sleep study  -Reflex to AutoPap follow-up in 90 days  -Discussed with patient she will need to wear for greater than 4 hours a night or for insurance coverage, ideally should wear for the entire duration of sleep.

## 2022-05-03 NOTE — ASSESSMENT & PLAN NOTE
Janith Severe continues to use and benefit from supplemental oxygen.  -Unrevealing pulmonary work-up thus far.   She does have some mild restrictive process but no evidence of interstitial lung disease at this time.  -She does have a history of undertreated pulmonary embolism due to GI bleed

## 2022-08-03 ENCOUNTER — OFFICE VISIT (OUTPATIENT)
Dept: PULMONOLOGY | Age: 87
End: 2022-08-03
Payer: COMMERCIAL

## 2022-08-03 ENCOUNTER — HOSPITAL ENCOUNTER (OUTPATIENT)
Dept: CT IMAGING | Age: 87
Discharge: HOME | End: 2022-08-03
Payer: COMMERCIAL

## 2022-08-03 VITALS
HEIGHT: 59 IN | HEART RATE: 73 BPM | TEMPERATURE: 97.5 F | DIASTOLIC BLOOD PRESSURE: 60 MMHG | RESPIRATION RATE: 16 BRPM | SYSTOLIC BLOOD PRESSURE: 126 MMHG | OXYGEN SATURATION: 96 % | BODY MASS INDEX: 29.97 KG/M2

## 2022-08-03 DIAGNOSIS — J98.4 RESTRICTIVE LUNG DISEASE: ICD-10-CM

## 2022-08-03 DIAGNOSIS — J98.4 RESTRICTIVE LUNG DISEASE: Primary | ICD-10-CM

## 2022-08-03 DIAGNOSIS — J96.11 CHRONIC HYPOXEMIC RESPIRATORY FAILURE (HCC): ICD-10-CM

## 2022-08-03 DIAGNOSIS — G47.33 OSA (OBSTRUCTIVE SLEEP APNEA): ICD-10-CM

## 2022-08-03 PROCEDURE — 71250 CT THORAX DX C-: CPT

## 2022-08-03 PROCEDURE — 99213 OFFICE O/P EST LOW 20 MIN: CPT | Performed by: INTERNAL MEDICINE

## 2022-08-03 PROCEDURE — G8427 DOCREV CUR MEDS BY ELIG CLIN: HCPCS | Performed by: INTERNAL MEDICINE

## 2022-08-03 PROCEDURE — G8417 CALC BMI ABV UP PARAM F/U: HCPCS | Performed by: INTERNAL MEDICINE

## 2022-08-03 PROCEDURE — G8400 PT W/DXA NO RESULTS DOC: HCPCS | Performed by: INTERNAL MEDICINE

## 2022-08-03 PROCEDURE — 1123F ACP DISCUSS/DSCN MKR DOCD: CPT | Performed by: INTERNAL MEDICINE

## 2022-08-03 PROCEDURE — 1090F PRES/ABSN URINE INCON ASSESS: CPT | Performed by: INTERNAL MEDICINE

## 2022-08-03 PROCEDURE — 1036F TOBACCO NON-USER: CPT | Performed by: INTERNAL MEDICINE

## 2022-08-03 RX ORDER — CLONAZEPAM 0.5 MG/1
0.5 TABLET ORAL 2 TIMES DAILY PRN
COMMUNITY

## 2022-08-03 ASSESSMENT — ENCOUNTER SYMPTOMS: SHORTNESS OF BREATH: 1

## 2022-08-03 NOTE — ASSESSMENT & PLAN NOTE
- Multiple possible etiologies including truncal obesity, diaphragmatic disorder, kyphoscoliosis as well as interstitial lung disease  -Needs HRCT to truly evaluate for ILD.

## 2022-08-03 NOTE — PROGRESS NOTES
[address] [facility] (:  1935) is a 82 y.o. female,Established patient, here for evaluation of the following chief complaint(s):  Sleep Apnea and Follow-up         ASSESSMENT/PLAN:  1. Restrictive lung disease  Assessment & Plan:   - Multiple possible etiologies including truncal obesity, diaphragmatic disorder, kyphoscoliosis as well as interstitial lung disease  -Needs HRCT to truly evaluate for ILD. Orders:  -     CT CHEST HIGH RESOLUTION; Future  2. STAR (obstructive sleep apnea)  3. Chronic hypoxemic respiratory failure [facility])  Assessment & Plan:  Jenn Lynn continues to use and benefit from supplemental oxygen.  -She does have a history of undertreated pulmonary embolism due to GI bleed    Return in about 6 months (around 2/3/2023). Future Appointments   Date Time Provider Luiza Stephanie   2/15/2023 11:00 AM Alvina Harkins MD [facility] PULM [facility]            Subjective   SUBJECTIVE/OBJECTIVE:  Since last visit had PFTs performed that showed restrictive lung disease. Still some shortness of breath but symptoms much improved on oxygen. Patient states she does not want any more invasive work-up but is okay having a CT of the chest done. Previously found to have sleep apnea but refuses CPAP so is now wearing oxygen at night. Review of Systems   Constitutional: Negative. Respiratory:  Positive for shortness of breath. Cardiovascular: Negative. Neurological: Negative. Psychiatric/Behavioral: Negative. Objective   Physical Exam     Gen:  No acute distress. Eyes: PERRL. EOMI. Anicteric sclera. No conjunctival injection. ENT: No discharge. oropharynx clear. External appearance of ears and nose normal.  Neck: Trachea midline. No mass   Resp:  No crackles. No wheezes. No rhonchi. No dullness on percussion. CV: Regular rate. Regular rhythm. No murmur or rub. No edema. GI: Soft, Non-tender. Non-distended.  +BS  Skin: Warm, dry, w/o erythema. Lymph: No cervical or supraclavicular LAD. M/S: No cyanosis. No clubbing. Neuro:  no focal neurologic deficit. Moves all extremities  Psych: Awake and alert, Oriented x 3. Judgement and insight appropriate. Mood stable. I spent 22 minutes with the patient, >50% was face-to-face in discussion of the diagnosis and the coordination of care in regards to the treatment plan. All questions answered. An electronic signature was used to authenticate this note.     --Stephen Gutierrez MD

## 2022-08-03 NOTE — ASSESSMENT & PLAN NOTE
Rush  continues to use and benefit from supplemental oxygen.  -She does have a history of undertreated pulmonary embolism due to GI bleed

## 2022-08-04 ENCOUNTER — TELEPHONE (OUTPATIENT)
Dept: PULMONOLOGY | Age: 87
End: 2022-08-04

## 2022-08-04 NOTE — TELEPHONE ENCOUNTER
JOLYNN for Chase Canales regarding her CT scan results. She was instructed to call the office. JOSÉ LUIS patient and gave her the information. She has not further questions at this time.

## 2022-08-04 NOTE — TELEPHONE ENCOUNTER
----- Message from Henry Hung MD sent at 8/4/2022  8:37 AM EDT -----  NO worrisome lung findings. Some of the small air sacs (alveoli) appear under inflated. This is improved with being more active and out of bed and chair most of the day. Follow up as scheduled.

## 2022-12-08 ENCOUNTER — APPOINTMENT (OUTPATIENT)
Dept: GENERAL RADIOLOGY | Age: 87
DRG: 698 | End: 2022-12-08
Payer: COMMERCIAL

## 2022-12-08 ENCOUNTER — HOSPITAL ENCOUNTER (INPATIENT)
Age: 87
LOS: 5 days | Discharge: NURSING, REHAB OR LONG-TERM CARE | DRG: 698 | End: 2022-12-13
Attending: EMERGENCY MEDICINE | Admitting: INTERNAL MEDICINE
Payer: COMMERCIAL

## 2022-12-08 ENCOUNTER — APPOINTMENT (OUTPATIENT)
Dept: CT IMAGING | Age: 87
DRG: 698 | End: 2022-12-08
Payer: COMMERCIAL

## 2022-12-08 DIAGNOSIS — N39.0 URINARY TRACT INFECTION ASSOCIATED WITH CYSTOSTOMY CATHETER, INITIAL ENCOUNTER (HCC): ICD-10-CM

## 2022-12-08 DIAGNOSIS — R41.82 ALTERED MENTAL STATUS, UNSPECIFIED ALTERED MENTAL STATUS TYPE: Primary | ICD-10-CM

## 2022-12-08 DIAGNOSIS — T83.510A URINARY TRACT INFECTION ASSOCIATED WITH CYSTOSTOMY CATHETER, INITIAL ENCOUNTER (HCC): ICD-10-CM

## 2022-12-08 LAB
A/G RATIO: 1.1 (ref 1.1–2.2)
ALBUMIN SERPL-MCNC: 4 G/DL (ref 3.4–5)
ALP BLD-CCNC: 95 U/L (ref 40–129)
ALT SERPL-CCNC: <5 U/L (ref 10–40)
ANION GAP SERPL CALCULATED.3IONS-SCNC: 10 MMOL/L (ref 3–16)
AST SERPL-CCNC: 12 U/L (ref 15–37)
BACTERIA: ABNORMAL /HPF
BASE EXCESS ARTERIAL: 9.2 MMOL/L (ref -3–3)
BASE EXCESS VENOUS: 12.7 MMOL/L
BASOPHILS ABSOLUTE: 0.1 K/UL (ref 0–0.2)
BASOPHILS RELATIVE PERCENT: 0.9 %
BILIRUB SERPL-MCNC: 0.3 MG/DL (ref 0–1)
BILIRUBIN URINE: NEGATIVE
BLOOD, URINE: ABNORMAL
BUN BLDV-MCNC: 16 MG/DL (ref 7–20)
CALCIUM SERPL-MCNC: 8.9 MG/DL (ref 8.3–10.6)
CARBOXYHEMOGLOBIN ARTERIAL: 1.5 % (ref 0–1.5)
CARBOXYHEMOGLOBIN: 1.4 %
CHLORIDE BLD-SCNC: 95 MMOL/L (ref 99–110)
CLARITY: ABNORMAL
CO2: 36 MMOL/L (ref 21–32)
COLOR: YELLOW
CREAT SERPL-MCNC: 0.8 MG/DL (ref 0.6–1.2)
EKG ATRIAL RATE: 75 BPM
EKG DIAGNOSIS: NORMAL
EKG P AXIS: 118 DEGREES
EKG P-R INTERVAL: 170 MS
EKG Q-T INTERVAL: 436 MS
EKG QRS DURATION: 78 MS
EKG QTC CALCULATION (BAZETT): 553 MS
EKG R AXIS: -18 DEGREES
EKG T AXIS: 18 DEGREES
EKG VENTRICULAR RATE: 97 BPM
EOSINOPHILS ABSOLUTE: 0.4 K/UL (ref 0–0.6)
EOSINOPHILS RELATIVE PERCENT: 5.6 %
EPITHELIAL CELLS, UA: 1 /HPF (ref 0–5)
GFR SERPL CREATININE-BSD FRML MDRD: >60 ML/MIN/{1.73_M2}
GLUCOSE BLD-MCNC: 118 MG/DL (ref 70–99)
GLUCOSE URINE: NEGATIVE MG/DL
HCO3 ARTERIAL: 35.9 MMOL/L (ref 21–29)
HCO3 VENOUS: 41 MMOL/L (ref 23–29)
HCT VFR BLD CALC: 35.9 % (ref 36–48)
HEMOGLOBIN, ART, EXTENDED: 11.1 G/DL (ref 12–16)
HEMOGLOBIN: 11.3 G/DL (ref 12–16)
HYALINE CASTS: 11 /LPF (ref 0–8)
KETONES, URINE: NEGATIVE MG/DL
LACTIC ACID: 1.3 MMOL/L (ref 0.4–2)
LEUKOCYTE ESTERASE, URINE: ABNORMAL
LYMPHOCYTES ABSOLUTE: 2.3 K/UL (ref 1–5.1)
LYMPHOCYTES RELATIVE PERCENT: 36 %
MCH RBC QN AUTO: 30.1 PG (ref 26–34)
MCHC RBC AUTO-ENTMCNC: 31.6 G/DL (ref 31–36)
MCV RBC AUTO: 95.3 FL (ref 80–100)
METHEMOGLOBIN ARTERIAL: 0.7 %
METHEMOGLOBIN VENOUS: 0.8 %
MICROSCOPIC EXAMINATION: YES
MONOCYTES ABSOLUTE: 0.4 K/UL (ref 0–1.3)
MONOCYTES RELATIVE PERCENT: 5.6 %
NEUTROPHILS ABSOLUTE: 3.4 K/UL (ref 1.7–7.7)
NEUTROPHILS RELATIVE PERCENT: 51.9 %
NITRITE, URINE: POSITIVE
O2 SAT, ARTERIAL: 97.8 %
O2 SAT, VEN: 47 %
O2 THERAPY: ABNORMAL
O2 THERAPY: ABNORMAL
OCCULT BLOOD DIAGNOSTIC: NORMAL
PCO2 ARTERIAL: 58.6 MMHG (ref 35–45)
PCO2, VEN: 70.6 MMHG (ref 40–50)
PDW BLD-RTO: 15.5 % (ref 12.4–15.4)
PH ARTERIAL: 7.39 (ref 7.35–7.45)
PH UA: 7 (ref 5–8)
PH VENOUS: 7.37 (ref 7.35–7.45)
PLATELET # BLD: 239 K/UL (ref 135–450)
PMV BLD AUTO: 7.6 FL (ref 5–10.5)
PO2 ARTERIAL: 88 MMHG (ref 75–108)
PO2, VEN: <30 MMHG
POTASSIUM REFLEX MAGNESIUM: 4.1 MMOL/L (ref 3.5–5.1)
PRO-BNP: 106 PG/ML (ref 0–449)
PROTEIN UA: ABNORMAL MG/DL
RAPID INFLUENZA  B AGN: NEGATIVE
RAPID INFLUENZA A AGN: NEGATIVE
RBC # BLD: 3.77 M/UL (ref 4–5.2)
RBC UA: 6 /HPF (ref 0–4)
SARS-COV-2, NAAT: NOT DETECTED
SODIUM BLD-SCNC: 141 MMOL/L (ref 136–145)
SPECIFIC GRAVITY UA: 1.01 (ref 1–1.03)
TCO2 ARTERIAL: 37.6 MMOL/L
TCO2 CALC VENOUS: 43 MMOL/L
TOTAL PROTEIN: 7.5 G/DL (ref 6.4–8.2)
TROPONIN: <0.01 NG/ML
URINE REFLEX TO CULTURE: YES
URINE TYPE: ABNORMAL
UROBILINOGEN, URINE: 1 E.U./DL
WBC # BLD: 6.5 K/UL (ref 4–11)
WBC UA: 204 /HPF (ref 0–5)

## 2022-12-08 PROCEDURE — 87635 SARS-COV-2 COVID-19 AMP PRB: CPT

## 2022-12-08 PROCEDURE — 1200000000 HC SEMI PRIVATE

## 2022-12-08 PROCEDURE — 81001 URINALYSIS AUTO W/SCOPE: CPT

## 2022-12-08 PROCEDURE — 2700000000 HC OXYGEN THERAPY PER DAY

## 2022-12-08 PROCEDURE — 93010 ELECTROCARDIOGRAM REPORT: CPT | Performed by: INTERNAL MEDICINE

## 2022-12-08 PROCEDURE — 6360000002 HC RX W HCPCS: Performed by: INTERNAL MEDICINE

## 2022-12-08 PROCEDURE — 84484 ASSAY OF TROPONIN QUANT: CPT

## 2022-12-08 PROCEDURE — 2580000003 HC RX 258: Performed by: EMERGENCY MEDICINE

## 2022-12-08 PROCEDURE — 83605 ASSAY OF LACTIC ACID: CPT

## 2022-12-08 PROCEDURE — 82270 OCCULT BLOOD FECES: CPT

## 2022-12-08 PROCEDURE — 87804 INFLUENZA ASSAY W/OPTIC: CPT

## 2022-12-08 PROCEDURE — 87077 CULTURE AEROBIC IDENTIFY: CPT

## 2022-12-08 PROCEDURE — 2580000003 HC RX 258: Performed by: INTERNAL MEDICINE

## 2022-12-08 PROCEDURE — 85025 COMPLETE CBC W/AUTO DIFF WBC: CPT

## 2022-12-08 PROCEDURE — 83880 ASSAY OF NATRIURETIC PEPTIDE: CPT

## 2022-12-08 PROCEDURE — 82803 BLOOD GASES ANY COMBINATION: CPT

## 2022-12-08 PROCEDURE — 87181 SC STD AGAR DILUTION PER AGT: CPT

## 2022-12-08 PROCEDURE — 70450 CT HEAD/BRAIN W/O DYE: CPT

## 2022-12-08 PROCEDURE — 87086 URINE CULTURE/COLONY COUNT: CPT

## 2022-12-08 PROCEDURE — 99285 EMERGENCY DEPT VISIT HI MDM: CPT

## 2022-12-08 PROCEDURE — 6370000000 HC RX 637 (ALT 250 FOR IP): Performed by: INTERNAL MEDICINE

## 2022-12-08 PROCEDURE — 80053 COMPREHEN METABOLIC PANEL: CPT

## 2022-12-08 PROCEDURE — 87186 SC STD MICRODIL/AGAR DIL: CPT

## 2022-12-08 PROCEDURE — 93005 ELECTROCARDIOGRAM TRACING: CPT | Performed by: EMERGENCY MEDICINE

## 2022-12-08 PROCEDURE — 71045 X-RAY EXAM CHEST 1 VIEW: CPT

## 2022-12-08 PROCEDURE — 36600 WITHDRAWAL OF ARTERIAL BLOOD: CPT

## 2022-12-08 PROCEDURE — 94761 N-INVAS EAR/PLS OXIMETRY MLT: CPT

## 2022-12-08 RX ORDER — SIMETHICONE 80 MG
80 TABLET,CHEWABLE ORAL 2 TIMES DAILY PRN
COMMUNITY

## 2022-12-08 RX ORDER — POTASSIUM CHLORIDE 20 MEQ/1
40 TABLET, EXTENDED RELEASE ORAL PRN
Status: DISCONTINUED | OUTPATIENT
Start: 2022-12-08 | End: 2022-12-13 | Stop reason: HOSPADM

## 2022-12-08 RX ORDER — DOCUSATE SODIUM 100 MG/1
100 CAPSULE, LIQUID FILLED ORAL DAILY
COMMUNITY

## 2022-12-08 RX ORDER — POLYETHYLENE GLYCOL 3350 17 G/17G
17 POWDER, FOR SOLUTION ORAL DAILY PRN
Status: DISCONTINUED | OUTPATIENT
Start: 2022-12-08 | End: 2022-12-13 | Stop reason: HOSPADM

## 2022-12-08 RX ORDER — ACETAMINOPHEN 325 MG/1
650 TABLET ORAL EVERY 6 HOURS PRN
Status: DISCONTINUED | OUTPATIENT
Start: 2022-12-08 | End: 2022-12-13 | Stop reason: HOSPADM

## 2022-12-08 RX ORDER — ONDANSETRON 2 MG/ML
4 INJECTION INTRAMUSCULAR; INTRAVENOUS EVERY 6 HOURS PRN
Status: DISCONTINUED | OUTPATIENT
Start: 2022-12-08 | End: 2022-12-13 | Stop reason: HOSPADM

## 2022-12-08 RX ORDER — FUROSEMIDE 40 MG/1
60 TABLET ORAL 2 TIMES DAILY
COMMUNITY

## 2022-12-08 RX ORDER — POTASSIUM CHLORIDE 7.45 MG/ML
10 INJECTION INTRAVENOUS PRN
Status: DISCONTINUED | OUTPATIENT
Start: 2022-12-08 | End: 2022-12-13 | Stop reason: HOSPADM

## 2022-12-08 RX ORDER — SENNA PLUS 8.6 MG/1
1 TABLET ORAL DAILY
COMMUNITY

## 2022-12-08 RX ORDER — RIVASTIGMINE TARTRATE 1.5 MG/1
1.5 CAPSULE ORAL 2 TIMES DAILY
Status: DISCONTINUED | OUTPATIENT
Start: 2022-12-08 | End: 2022-12-13 | Stop reason: HOSPADM

## 2022-12-08 RX ORDER — ONDANSETRON 4 MG/1
4 TABLET, ORALLY DISINTEGRATING ORAL EVERY 8 HOURS PRN
Status: DISCONTINUED | OUTPATIENT
Start: 2022-12-08 | End: 2022-12-13 | Stop reason: HOSPADM

## 2022-12-08 RX ORDER — ENOXAPARIN SODIUM 100 MG/ML
40 INJECTION SUBCUTANEOUS NIGHTLY
Status: DISCONTINUED | OUTPATIENT
Start: 2022-12-08 | End: 2022-12-13 | Stop reason: HOSPADM

## 2022-12-08 RX ORDER — ACETAMINOPHEN 650 MG/1
650 SUPPOSITORY RECTAL EVERY 6 HOURS PRN
Status: DISCONTINUED | OUTPATIENT
Start: 2022-12-08 | End: 2022-12-13 | Stop reason: HOSPADM

## 2022-12-08 RX ORDER — SODIUM CHLORIDE 0.9 % (FLUSH) 0.9 %
5-40 SYRINGE (ML) INJECTION EVERY 12 HOURS SCHEDULED
Status: DISCONTINUED | OUTPATIENT
Start: 2022-12-08 | End: 2022-12-13 | Stop reason: HOSPADM

## 2022-12-08 RX ORDER — SODIUM CHLORIDE 9 MG/ML
INJECTION, SOLUTION INTRAVENOUS CONTINUOUS
Status: DISCONTINUED | OUTPATIENT
Start: 2022-12-08 | End: 2022-12-09

## 2022-12-08 RX ORDER — HYDROXYZINE PAMOATE 25 MG/1
25 CAPSULE ORAL 3 TIMES DAILY PRN
COMMUNITY

## 2022-12-08 RX ORDER — ASPIRIN 81 MG/1
81 TABLET ORAL DAILY
Status: DISCONTINUED | OUTPATIENT
Start: 2022-12-08 | End: 2022-12-08

## 2022-12-08 RX ORDER — SODIUM CHLORIDE 9 MG/ML
25 INJECTION, SOLUTION INTRAVENOUS PRN
Status: DISCONTINUED | OUTPATIENT
Start: 2022-12-08 | End: 2022-12-13 | Stop reason: HOSPADM

## 2022-12-08 RX ORDER — 0.9 % SODIUM CHLORIDE 0.9 %
1000 INTRAVENOUS SOLUTION INTRAVENOUS ONCE
Status: COMPLETED | OUTPATIENT
Start: 2022-12-08 | End: 2022-12-08

## 2022-12-08 RX ORDER — LORATADINE 10 MG/1
10 TABLET ORAL DAILY
COMMUNITY

## 2022-12-08 RX ORDER — MAGNESIUM HYDROXIDE/ALUMINUM HYDROXICE/SIMETHICONE 120; 1200; 1200 MG/30ML; MG/30ML; MG/30ML
30 SUSPENSION ORAL EVERY 6 HOURS PRN
Status: DISCONTINUED | OUTPATIENT
Start: 2022-12-08 | End: 2022-12-09

## 2022-12-08 RX ORDER — CARBIDOPA AND LEVODOPA 50; 200 MG/1; MG/1
1 TABLET, EXTENDED RELEASE ORAL NIGHTLY
Status: DISCONTINUED | OUTPATIENT
Start: 2022-12-08 | End: 2022-12-10

## 2022-12-08 RX ORDER — SODIUM CHLORIDE 0.9 % (FLUSH) 0.9 %
5-40 SYRINGE (ML) INJECTION PRN
Status: DISCONTINUED | OUTPATIENT
Start: 2022-12-08 | End: 2022-12-13 | Stop reason: HOSPADM

## 2022-12-08 RX ADMIN — RIVASTIGMINE TARTRATE 1.5 MG: 1.5 CAPSULE ORAL at 21:41

## 2022-12-08 RX ADMIN — CARBIDOPA AND LEVODOPA 2 TABLET: 25; 100 TABLET ORAL at 17:56

## 2022-12-08 RX ADMIN — CARBIDOPA AND LEVODOPA 2 TABLET: 25; 100 TABLET ORAL at 15:24

## 2022-12-08 RX ADMIN — CEFEPIME 2000 MG: 2 INJECTION, POWDER, FOR SOLUTION INTRAVENOUS at 14:10

## 2022-12-08 RX ADMIN — SODIUM CHLORIDE 1000 ML: 9 INJECTION, SOLUTION INTRAVENOUS at 11:33

## 2022-12-08 RX ADMIN — CARBIDOPA AND LEVODOPA 1 TABLET: 50; 200 TABLET, EXTENDED RELEASE ORAL at 21:40

## 2022-12-08 RX ADMIN — SODIUM CHLORIDE: 9 INJECTION, SOLUTION INTRAVENOUS at 13:38

## 2022-12-08 RX ADMIN — SODIUM CHLORIDE: 9 INJECTION, SOLUTION INTRAVENOUS at 15:28

## 2022-12-08 RX ADMIN — ENOXAPARIN SODIUM 40 MG: 100 INJECTION SUBCUTANEOUS at 21:40

## 2022-12-08 ASSESSMENT — ENCOUNTER SYMPTOMS
DIARRHEA: 0
WHEEZING: 0
EYE PAIN: 0
COUGH: 0
BACK PAIN: 0
SORE THROAT: 0
RHINORRHEA: 0
EYE DISCHARGE: 0
VOMITING: 0
SHORTNESS OF BREATH: 0
ABDOMINAL PAIN: 0
NAUSEA: 0

## 2022-12-08 ASSESSMENT — PAIN SCALES - GENERAL
PAINLEVEL_OUTOF10: 0

## 2022-12-08 ASSESSMENT — PAIN - FUNCTIONAL ASSESSMENT: PAIN_FUNCTIONAL_ASSESSMENT: 0-10

## 2022-12-08 NOTE — ED TRIAGE NOTES
[place] [facility] [place] LKW [place] 0600. Patient noted to be altered prior to arrival. EMS states negative on the stroke scale.

## 2022-12-08 NOTE — PLAN OF CARE
Problem: Discharge Planning  Goal: Discharge to home or other facility with appropriate resources  12/8/2022 1609 by Monalisa Loredo RN  Outcome: Progressing     Problem: Pain  Goal: Verbalizes/displays adequate comfort level or baseline comfort level  12/8/2022 1609 by Monalisa Loredo RN  Outcome: Progressing     Problem: Skin/Tissue Integrity  Goal: Absence of new skin breakdown  Description: 1. Monitor for areas of redness and/or skin breakdown  2. Assess vascular access sites hourly  3. Every 4-6 hours minimum:  Change oxygen saturation probe site  4. Every 4-6 hours:  If on nasal continuous positive airway pressure, respiratory therapy assess nares and determine need for appliance change or resting period.   12/8/2022 1609 by Monalisa Loredo RN  Outcome: Progressing     Problem: Safety - Adult  Goal: Free from fall injury  12/8/2022 1609 by Monalisa Loredo RN  Outcome: Progressing     Problem: ABCDS Injury Assessment  Goal: Absence of physical injury  12/8/2022 1609 by Monalisa Loredo RN  Outcome: Progressing

## 2022-12-08 NOTE — H&P
History and Physical  Yenifer Angeles MD      Name:  Staci Peters /Age/Sex: 1935  (82 y.o. female)   MRN & CSN:  [id number] & [id number] Admission Date/Time: 2022 10:23 AM   Location:  [address] PCP: [facility] Day: 1    Assessment and Plan:     Staci Peters is a 82 y.o.  female  who presents with confusion    Acute encephalopathy unknown etiology. Concern for UTI but the patient has suprapubic catheter and can have colonization. We will give cefepime 2 cultures are back. In the past she has grown E. coli and Pseudomonas. Suprapubic catheter was exchanged 2 weeks ago. Symptom has already improved with IV fluids    Dementia Parkinson's. Resume home medications Sinemet, sertraline and rivastigmine    Generalized weakness. Consult PT and OT    Hypertension. Currently holding blood pressure medications. Resume in a.m. Body mass index is 35.67 kg/m². Diet ADULT DIET; Regular; Low Fat/Low Chol/High Fiber/2 gm Na   DVT Prophylaxis [x] Lovenox, []  Heparin, [] SCDs, [] Ambulation   GI Prophylaxis [] PPI,  [] H2 Blocker,  [x] Carafate,  [] Diet/Tube Feeds   Code Status Patient's code status was discussed and is DNR-CCA   Disposition [facility] in 2-3 days   MDM [] Low, [x] Moderate,[]  High  Patient's risk as above due to above     Patient is in agreement with the plan as stated above. The patient's medication has been reviewed and verified with the patient and/or family/pharmacy. Records from [facility] everywhere\" has been reviewed including progress notes, office  note, labs and investigation prior and summarized in the body of HPI    History of Present Illness:     Chief Complaint: Altered mental status    History obtained from patient, patient's daughter, medical records and the ER physician. Staci Peters is a 82 y.o. female with PMH as below presented to the ER with chief complaints of confusion this morning.   According to the patient's daughter she received a call from the nursing home that the patient was noted to be too drowsy today. According to the patient she did not have a good dinner last night and this morning when she went to the restroom she was unable to get up and they brought her to the ER. According to the daughter she talked to the patient yesterday and she was talking fine. While he was in the ER the patient received IV fluids and has already started to improve and according to the daughter she was coming back to her baseline. Review of Systems:    Ten point ROS reviewed negative, unless as noted above    Const: no Weight Loss   Eyes:  No itching, or redness   ENT  No rhinorrhea, throat pain, or swelling   Resp:  No hemomptysis   CVS:  No Orthopnea   GI:  No hematochezia   :  No hematuria  MSK: No joint pain   Endo:  No increased thirst, heat/cold intolerance   Skin:  No bruising, rashes  Neuro: More lethargic  Psych:  No depression or anxiet    Objective:     No intake or output data in the 24 hours ending 12/08/22 1342     Labs reviewed. Significant for UA with increased WBC, positive bacteria and nitrates    Vitals:   Vitals:    12/08/22 1335   BP:    Pulse: 74   Resp: 17   Temp:    SpO2: 99%       Physical Exam:     Gen: NAD. Eye: JOSÉ MIGUEL, No Icterus  Nose: Midline, No Rhinorrhea  Oropharynx: Moist Mucus membrane. Ears: B/L Symmetrical, slightly decreased hearing. Neck: Supple, No JVD  CVS: S1-S2, RRR. Resp: CTAB, No end expiratory wheezing, No Crackles. Normal Resp effort. Abdo: BS+, S, NT/ND, No Guarding, No rigidity, no rebound  Neuro: Following commands generalized weakness  Psych: Awake alert and oriented x3. [facility]]   Appropriate mood  Extrem: No Edema, No Cyanosis, ROM normal   Skin: No rash, no subcutaneous nodule palpated    Past Medical History:      PMH:   Past Medical History:   Diagnosis Date    Contraindication to anticoagulation therapy     fall risk    Hiatal hernia     High blood pressure     Neurogenic bladder Parkinson disease ([facility] [address]     pulmonary embolism 12/13/2020    right upper and lower lobes    Suprapubic catheter (HCC)        PSHX:  has a past surgical history that includes Tonsillectomy and adenoidectomy (1943); Appendectomy (1948); Dilation and curettage of uterus (1959 60 62 63); Hysterectomy (1974); Vagina surgery (2006); Abdomen surgery; Cystoscopy (N/A, 12/03/2020); IVC filter insertion (Right, 12/17/2020); and Upper gastrointestinal endoscopy (N/A, 2/26/2021). Allergies: Allergies   Allergen Reactions    Caduet [Amlodipine-Atorvastatin]     Estrogens Conjugated Other (See Comments)     lightheaded    Penicillins Rash    Terramycin [Oxytetracycline-Lidocaine] Rash       FAM HX: family history is not on file.     Soc HX:   Social History     Socioeconomic History    Marital status:      Spouse name: None    Number of children: None    Years of education: None    Highest education level: None   Tobacco Use    Smoking status: Never    Smokeless tobacco: Never   Vaping Use    Vaping Use: Never used   Substance and Sexual Activity    Alcohol use: Yes     Comment: occasional    Drug use: No       Medications:     Medications: Reviewed    Electronically signed by Joey Cleveland MD, MD on 12/8/2022 at 1:42 PM

## 2022-12-08 NOTE — ED PROVIDER NOTES
[facility]  eMERGENCY dEPARTMENT eNCOUnter        Pt Name: Venecia Campo  MRN: [id number]  [place] 1935  Date of evaluation: 12/8/2022  Provider: Sandrine Howell MD  PCP: [facility]       Chief Complaint   Patient presents with    Altered Mental Status     [place] [facility] [place] altered mental status this morning, was only alert to painful stimuli, LKW 0600       HISTORY OFPRESENT ILLNESS   (Location/Symptom, Timing/Onset, Context/Setting, Quality, Duration, Modifying Factors,Severity)  Note limiting factors. Venecia Campo is a 82 y.o. female   with a history of    has a past medical history of Contraindication to anticoagulation therapy, Hiatal hernia, High blood pressure, Neurogenic bladder, Parkinson disease ([address] pulmonary embolism, and Suprapubic catheter ([address] Who presents with duration of mental status. Patient is normally fully alert and conversive. In fact, the daughter informs me that patient left the 2-minute voicemail yesterday. Patient got up around 5:30 in the morning and the staff assisted her to go to the bathroom. Patient thought she needed to have a bowel movement but was unable to go. Then later on this morning she was almost unresponsive. Only responding to painful stimuli. Her pulse oximetry was a bit low. She is normally on oxygen chronically but she states she did drop down to 89% on her usual 2 L. No strong cardiac or pulmonary history. Just over the last few years she has needed oxygen as needed. Nursing Noteswere all reviewed and agreed with or any disagreements were addressed  in the HPI. REVIEW OF SYSTEMS    (2-9 systems for level 4, 10 or more for level 5)     Review of Systems   Constitutional:  Negative for chills, fatigue and fever. HENT:  Negative for ear pain, rhinorrhea and sore throat. Eyes:  Negative for pain, discharge and visual disturbance.    Respiratory:  Negative for cough, shortness of breath and wheezing. Cardiovascular:  Negative for chest pain, palpitations and leg swelling. Gastrointestinal:  Negative for abdominal pain, diarrhea, nausea and vomiting. Genitourinary:  Negative for difficulty urinating, dysuria, pelvic pain and vaginal discharge. Patient has a diverting suprapubic catheter   Musculoskeletal:  Negative for arthralgias, back pain, joint swelling and neck pain. Skin:  Negative for rash. Allergic/Immunologic: Negative for environmental allergies. Neurological:  Positive for tremors (Patient has Parkinson's). Negative for dizziness, seizures, syncope and headaches. Hematological:  Negative for adenopathy. Psychiatric/Behavioral:  Positive for confusion. Negative for dysphoric mood and suicidal ideas. The patient is not nervous/anxious. PAST MEDICAL HISTORY     Past Medical History:   Diagnosis Date    Contraindication to anticoagulation therapy     fall risk    Hiatal hernia     High blood pressure     Neurogenic bladder     Parkinson disease ([address]     pulmonary embolism 12/13/2020    right upper and lower lobes    Suprapubic catheter ([address]          SURGICAL HISTORY     Past Surgical History:   Procedure Laterality Date    ABDOMEN SURGERY      bowel obstruction    APPENDECTOMY  1948    CYSTOSCOPY N/A 12/03/2020    CYSTOSCOPY,  WITH INTRAVESICAL INJECTION OF BOTOX 200UNITS, WITH SUPRAPUBIC TUBE EXCHANGE performed by Saba Calvillo MD at [facility] ([facility])  1974    IVC FILTER INSERTION Right 12/17/2020    Dr. Lucas Caban.  Bessy retreivable IVC filter    TONSILLECTOMY AND ADENOIDECTOMY  1943    UPPER GASTROINTESTINAL ENDOSCOPY N/A 2/26/2021    EGD BIOPSY performed by Gregory Clark MD at [address]  2006    repair vagina bladder and rectum         CURRENTMEDICATIONS       Previous Medications    ACETAMINOPHEN (TYLENOL) 500 MG TABLET    Take 650 mg by mouth 3 times daily as needed for Pain    ALBUTEROL SULFATE HFA (VENTOLIN HFA) 108 (90 BASE) MCG/ACT INHALER    Inhale 2 puffs into the lungs 4 times daily as needed for Wheezing or Shortness of Breath    ALENDRONATE (FOSAMAX) 70 MG TABLET    Take 70 mg by mouth every 7 days Tuesdays    ASCORBIC ACID (VITAMIN C) 1000 MG TABLET    Take 1,000 mg by mouth Daily with lunch     CARBIDOPA-LEVODOPA (SINEMET CR)  MG PER EXTENDED RELEASE TABLET    Take 1 tablet by mouth nightly    CARBIDOPA-LEVODOPA (SINEMET)  MG PER TABLET    Take 2 tablets by mouth 5 times daily 0600, 0900, 1200, 1500, 1800    CARBOXYMETHYLCELLULOSE (REFRESH PLUS) 0.5 % SOLN OPHTHALMIC SOLUTION    Place 1 drop into both eyes 4 times daily as needed for Dry Eyes     CLONAZEPAM (KLONOPIN) 0.5 MG TABLET    Take 0.5 mg by mouth nightly. CRANBERRY PO    Take 800 mg by mouth daily     DIBUCAINE (NUPERCAINAL) 1 % OINTMENT    Apply topically 3 times daily as needed (Hemorrhoids) Apply dime size amount to external hemorrhoids    DICLOFENAC SODIUM (VOLTAREN) 1 % GEL    Apply topically 2 times daily as needed for Pain    DOCUSATE SODIUM (COLACE) 100 MG CAPSULE    Take 100 mg by mouth daily    FUROSEMIDE (LASIX) 40 MG TABLET    Take 60 mg by mouth in the morning and 60 mg in the evening.     HYDROXYZINE PAMOATE (VISTARIL) 25 MG CAPSULE    Take 25 mg by mouth 3 times daily as needed for Itching    LISINOPRIL (PRINIVIL;ZESTRIL) 5 MG TABLET    Take 5 mg by mouth daily    LORATADINE (CLARITIN) 10 MG TABLET    Take 10 mg by mouth daily    LOTEPREDNOL (LOTEMAX) 0.5 % OPHTHALMIC SUSPENSION    Place 1 drop into both eyes 2 times daily as needed (Eye irritation)     MAGNESIUM OXIDE 400 MG CAPS    Take 400 mg by mouth daily    MIRTAZAPINE (REMERON) 15 MG TABLET    Take 15 mg by mouth nightly    MULTIPLE VITAMINS-MINERALS (THERAPEUTIC MULTIVITAMIN-MINERALS) TABLET    Take 1 tablet by mouth daily    PANTOPRAZOLE (PROTONIX) 40 MG TABLET    Take 1 tablet by mouth 2 times daily (before meals)    POLYETHYLENE GLYCOL (GLYCOLAX) 17 G PACKET    Take 17 g by mouth daily as needed for Constipation     POTASSIUM CHLORIDE (KLOR-CON M) 20 MEQ TBCR EXTENDED RELEASE TABLET    Take 1 tablet by mouth daily (with breakfast)    PRAVASTATIN (PRAVACHOL) 20 MG TABLET    Take 20 mg by mouth nightly     PROBIOTIC PRODUCT (PROBIOTIC-10 PO)    Take 1 tablet by mouth daily    RIVASTIGMINE (EXELON) 1.5 MG CAPSULE    Take 1.5 mg by mouth 2 times daily    SENNA (SENOKOT) 8.6 MG TABLET    Take 1 tablet by mouth daily    SERTRALINE (ZOLOFT) 100 MG TABLET    Take 100 mg by mouth daily    SIMETHICONE (MYLICON) 80 MG CHEWABLE TABLET    Take 80 mg by mouth 2 times daily as needed for Flatulence    VERAPAMIL (VERELAN) 360 MG EXTENDED RELEASE CAPSULE    Take 360 mg by mouth every morning     VITAMIN D (CHOLECALCIFEROL) 25 MCG (1000 UT) TABS TABLET    Take 1,000 Units by mouth daily       ALLERGIES     Caduet [amlodipine-atorvastatin], Estrogens conjugated, Penicillins, and Terramycin [oxytetracycline-lidocaine]    FAMILY HISTORY     History reviewed. No pertinent family history.        SOCIAL HISTORY       Social History     Socioeconomic History    Marital status:      Spouse name: None    Number of children: None    Years of education: None    Highest education level: None   Tobacco Use    Smoking status: Never    Smokeless tobacco: Never   Vaping Use    Vaping Use: Never used   Substance and Sexual Activity    Alcohol use: Yes     Comment: occasional    Drug use: No       SCREENINGS    Morton Coma Scale  Eye Opening: Spontaneous  Best Verbal Response: Oriented  Best Motor Response: Obeys commands  Christa Coma Scale Score: 15        PHYSICAL EXAM    (up to 7 for level 4, 8 or more for level 5)     ED Triage Vitals [12/08/22 1030]   BP Temp Temp Source Heart Rate Resp SpO2 Height Weight   (!) 135/58 98.3 °F (36.8 °C) Oral 76 16 93 % 4' 11\" (1.499 m) 176 lb 9.4 oz (80.1 kg)      height is 4' 11\" (1.499 m) and weight is 176 lb 9.4 oz (80.1 kg). Her oral temperature is 98.7 °F (37.1 °C). Her blood pressure is 129/64 and her pulse is 74. Her respiration is 17 and oxygen saturation is 99%. Physical Exam  Constitutional:       General: She is not in acute distress. Appearance: She is well-developed. She is ill-appearing. She is not diaphoretic. HENT:      Head: Normocephalic and atraumatic. Right Ear: External ear normal.      Left Ear: External ear normal.   Eyes:      General: No scleral icterus. Right eye: No discharge. Left eye: No discharge. Extraocular Movements: Extraocular movements intact. Comments: She has a little bit of swelling around the right eye of the conjunctiva and eyelids. However both pupils are equal round reactive in size and extraocular muscles are intact   Neck:      Thyroid: No thyromegaly. Vascular: No JVD. Trachea: No tracheal deviation. Cardiovascular:      Rate and Rhythm: Normal rate and regular rhythm. Heart sounds: No murmur heard. No friction rub. No gallop. Pulmonary:      Effort: Pulmonary effort is normal. No respiratory distress. Breath sounds: Normal breath sounds. No stridor. No wheezing or rales. Abdominal:      General: There is no distension. Palpations: Abdomen is soft. Tenderness: There is no abdominal tenderness. There is no guarding or rebound. Musculoskeletal:         General: No tenderness. Cervical back: Normal range of motion. Skin:     General: Skin is warm and dry. Findings: No rash (On exposed body surfaces). Neurological:      General: No focal deficit present. Mental Status: She is oriented to person, place, and time. She is lethargic. GCS: GCS eye subscore is 4. GCS verbal subscore is 5. GCS motor subscore is 6. Cranial Nerves: Cranial nerves 2-12 are intact.       Motor: Tremor (Right upper extremity related to Parkinson's) present. Coordination: Coordination normal.   Psychiatric:         Behavior: Behavior normal.         Thought Content:  Thought content normal.       DIAGNOSTIC RESULTS   LABS:    Results for orders placed or performed during the hospital encounter of 12/08/22   COVID-19, Rapid    Specimen: Nasopharyngeal Swab   Result Value Ref Range    SARS-CoV-2, NAAT Not Detected Not Detected   Rapid influenza A/B antigens    Specimen: Nasopharyngeal   Result Value Ref Range    Rapid Influenza A Ag Negative Negative    Rapid Influenza B Ag Negative Negative   CBC with Auto Differential   Result Value Ref Range    WBC 6.5 4.0 - 11.0 K/uL    RBC 3.77 (L) 4.00 - 5.20 M/uL    Hemoglobin 11.3 (L) 12.0 - 16.0 g/dL    Hematocrit 35.9 (L) 36.0 - 48.0 %    MCV 95.3 80.0 - 100.0 fL    MCH 30.1 26.0 - 34.0 pg    MCHC 31.6 31.0 - 36.0 g/dL    RDW 15.5 (H) 12.4 - 15.4 %    Platelets 834 376 - 098 K/uL    MPV 7.6 5.0 - 10.5 fL    Neutrophils % 51.9 %    Lymphocytes % 36.0 %    Monocytes % 5.6 %    Eosinophils % 5.6 %    Basophils % 0.9 %    Neutrophils Absolute 3.4 1.7 - 7.7 K/uL    Lymphocytes Absolute 2.3 1.0 - 5.1 K/uL    Monocytes Absolute 0.4 0.0 - 1.3 K/uL    Eosinophils Absolute 0.4 0.0 - 0.6 K/uL    Basophils Absolute 0.1 0.0 - 0.2 K/uL   CMP w/ Reflex to MG   Result Value Ref Range    Sodium 141 136 - 145 mmol/L    Potassium reflex Magnesium 4.1 3.5 - 5.1 mmol/L    Chloride 95 (L) 99 - 110 mmol/L    CO2 36 (H) 21 - 32 mmol/L    Anion Gap 10 3 - 16    Glucose 118 (H) 70 - 99 mg/dL    BUN 16 7 - 20 mg/dL    Creatinine 0.8 0.6 - 1.2 mg/dL    Est, Glom Filt Rate >60 >60    Calcium 8.9 8.3 - 10.6 mg/dL    Total Protein 7.5 6.4 - 8.2 g/dL    Albumin 4.0 3.4 - 5.0 g/dL    Albumin/Globulin Ratio 1.1 1.1 - 2.2    Total Bilirubin 0.3 0.0 - 1.0 mg/dL    Alkaline Phosphatase 95 40 - 129 U/L    ALT <5 (L) 10 - 40 U/L    AST 12 (L) 15 - 37 U/L   Lactic Acid   Result Value Ref Range    Lactic Acid 1.3 0.4 - 2.0 mmol/L   Troponin Result Value Ref Range    Troponin <0.01 <0.01 ng/mL   Urinalysis with Reflex to Culture    Specimen: Urine, suprapubic catheter   Result Value Ref Range    Color, UA Yellow Straw/Yellow    Clarity, UA CLOUDY (A) Clear    Glucose, Ur Negative Negative mg/dL    Bilirubin Urine Negative Negative    Ketones, Urine Negative Negative mg/dL    Specific Gravity, UA 1.013 1.005 - 1.030    Blood, Urine TRACE (A) Negative    pH, UA 7.0 5.0 - 8.0    Protein, UA TRACE (A) Negative mg/dL    Urobilinogen, Urine 1.0 <2.0 E.U./dL    Nitrite, Urine POSITIVE (A) Negative    Leukocyte Esterase, Urine LARGE (A) Negative    Microscopic Examination YES     Urine Type NotGiven     Urine Reflex to Culture Yes    Brain Natriuretic Peptide   Result Value Ref Range    Pro- 0 - 449 pg/mL   Blood Occult Stool #1   Result Value Ref Range    Occult Blood Diagnostic Result: Negative  Normal range: Negative      Blood Gas, Venous   Result Value Ref Range    pH, Jason 7.370 7.350 - 7.450    pCO2, Jason 70.6 (H) 40.0 - 50.0 mmHg    pO2, Jason <30 Not Established mmHg    HCO3, Venous 41 (H) 23 - 29 mmol/L    Base Excess, Jason 12.7 Not Established mmol/L    O2 Sat, Jason 47 Not Established %    Carboxyhemoglobin 1.4 %    MetHgb, Jason 0.8 <1.5 %    TC02 (Calc), Jason 43 Not Established mmol/L    O2 Therapy Unknown    Microscopic Urinalysis   Result Value Ref Range    Bacteria, UA 4+ (A) None Seen /HPF    Hyaline Casts, UA 11 (H) 0 - 8 /LPF    WBC,  (H) 0 - 5 /HPF    RBC, UA 6 (H) 0 - 4 /HPF    Epithelial Cells, UA 1 0 - 5 /HPF   EKG 12 Lead   Result Value Ref Range    Ventricular Rate 97 BPM    Atrial Rate 75 BPM    P-R Interval 170 ms    QRS Duration 78 ms    Q-T Interval 436 ms    QTc Calculation (Bazett) 553 ms    P Axis 118 degrees    R Axis -18 degrees    T Axis 18 degrees    Diagnosis       *Poor data quality, interpretation may be adversely affectedSinus rhythm with Premature supraventricular complexes  likelyNonspecific ST and T wave abnormality  vs artifactAbnormal ECGWhen compared with ECG of 26-FEB-2021 00:51,Poor data quality limits comparisonConfirmed by [facility] Barry DEL RIO MD ([id number]) on 12/8/2022 12:22:28 PM         All other labs were within normal range or not returned as of this dictation. EKG: All EKG's are interpreted by the Emergency Department Physician who either signs orCo-signs this chart in the absence of a cardiologist.    EKG is nondiagnostic. There is significant tremor artifact. It appears to be a regular rhythm and appears to be sinus. No overt acute ST elevation. Nearly isoelectric in lead aVF. Again though significant artifact from the patient's parkinsonian tremor. RADIOLOGY:   plain film images such as CT, Ultrasound and MRI are read by the radiologist. Alfa Primer radiographic images are visualized and preliminarily interpreted by the  EDProvider with the below findings:    CT Head W/O Contrast    Result Date: 12/8/2022  EXAMINATION: CT OF THE HEAD WITHOUT CONTRAST  12/8/2022 11:19 am TECHNIQUE: CT of the head was performed without the administration of intravenous contrast. Automated exposure control, iterative reconstruction, and/or weight based adjustment of the mA/kV was utilized to reduce the radiation dose to as low as reasonably achievable. COMPARISON: None. HISTORY: ORDERING SYSTEM PROVIDED HISTORY: AMS TECHNOLOGIST PROVIDED HISTORY: Reason for exam:->AMS Has a \"code stroke\" or \"stroke alert\" been called? ->No Decision Support Exception - unselect if not a suspected or confirmed emergency medical condition->Emergency Medical Condition (MA) Reason for Exam: ams FINDINGS: BRAIN/VENTRICLES: There is no acute intracranial hemorrhage, mass effect or midline shift. No abnormal extra-axial fluid collection. The gray-white differentiation is maintained without evidence of an acute infarct. There is no evidence of hydrocephalus.  Mild-to-moderate senescent changes with parenchymal volume loss and chronic small vessel ischemic changes. ORBITS: The visualized portion of the orbits demonstrate no acute abnormality. SINUSES: The visualized paranasal sinuses and mastoid air cells demonstrate no acute abnormality. SOFT TISSUES/SKULL:  No acute abnormality of the visualized skull or soft tissues. No acute intracranial abnormality. Mild-to-moderate senescent changes with parenchymal volume loss and chronic small vessel ischemic changes. XR CHEST PORTABLE    Result Date: 12/8/2022  EXAMINATION: ONE XRAY VIEW OF THE CHEST 12/8/2022 11:22 am COMPARISON: 03/16/2022 HISTORY: ORDERING SYSTEM PROVIDED HISTORY: SOB TECHNOLOGIST PROVIDED HISTORY: Reason for exam:->SOB Reason for Exam: SOB, AMS FINDINGS: Heart size is stable. Mediastinal contours are stable. Pulmonary vascularity is stable. There is hazy opacity seen at the bases bilaterally, right greater than left, increased compared to prior. Increased bibasilar airspace disease, either atelectasis or pneumonia            PROCEDURES   Unless otherwise noted below, none     Procedures    CRITICAL CARE TIME   CRITICAL CARE: There was a high probability of clinically significant/life threatening deterioration in this patient's condition which required my urgent intervention. Total critical care time was 25 minutes. This excludes any time for separately reportable procedures.        CONSULTS:  IP CONSULT TO CASE MANAGEMENT  IP CONSULT TO SOCIAL WORK    EMERGENCY DEPARTMENT COURSE and DIFFERENTIAL DIAGNOSIS/MDM:   Vitals:    Vitals:    12/08/22 1258 12/08/22 1259 12/08/22 1330 12/08/22 1335   BP:   129/64    Pulse: 69 69 73 74   Resp: 22 21 20 17   Temp:       TempSrc:       SpO2: 99% 98% 96% 99%   Weight:       Height:           Patient was given the following medications:  Medications   apixaban (ELIQUIS) tablet 5 mg (has no administration in time range)   carbidopa-levodopa (SINEMET CR)  MG per extended release tablet 1 tablet (has no administration in time range)   aspirin EC tablet 81 mg (has no administration in time range)   carbidopa-levodopa (SINEMET)  MG per tablet 2 tablet (has no administration in time range)   rivastigmine (EXELON) capsule 1.5 mg (has no administration in time range)   0.9 % sodium chloride infusion ( IntraVENous New Bag 12/8/22 1338)   sodium chloride flush 0.9 % injection 5-40 mL (has no administration in time range)   sodium chloride flush 0.9 % injection 5-40 mL (has no administration in time range)   0.9 % sodium chloride infusion (has no administration in time range)   potassium chloride (KLOR-CON M) extended release tablet 40 mEq (has no administration in time range)     Or   potassium bicarb-citric acid (EFFER-K) effervescent tablet 40 mEq (has no administration in time range)     Or   potassium chloride 10 mEq/100 mL IVPB (Peripheral Line) (has no administration in time range)   ondansetron (ZOFRAN-ODT) disintegrating tablet 4 mg (has no administration in time range)     Or   ondansetron (ZOFRAN) injection 4 mg (has no administration in time range)   polyethylene glycol (GLYCOLAX) packet 17 g (has no administration in time range)   aluminum & magnesium hydroxide-simethicone (MAALOX) 200-200-20 MG/5ML suspension 30 mL (has no administration in time range)   acetaminophen (TYLENOL) tablet 650 mg (has no administration in time range)     Or   acetaminophen (TYLENOL) suppository 650 mg (has no administration in time range)   cefepime (MAXIPIME) 2000 mg IVPB minibag (has no administration in time range)     Followed by   cefepime (MAXIPIME) 2000 mg IVPB minibag (has no administration in time range)   0.9 % sodium chloride bolus (0 mLs IntraVENous Stopped 12/8/22 1336)       Quite stable. Although she certainly is not unresponsive she definitely is not her usual self. We will admit her for treatment for the urinary tract infection. Otherwise she does seem fairly stable. I do not appreciate sepsis or septic shock.     Is this patient to be included in the SEP-1 Core Measure due to severe sepsis or septic shock? No   Exclusion criteria - the patient is NOT to be included for SEP-1 Core Measure due to:  2+ SIRS criteria are not met    FINAL IMPRESSION      1. Altered mental status, unspecified altered mental status type    2. Urinary tract infection associated with cystostomy catheter, initial encounter [facility])          290Noris Carter Admitted 12/08/2022 12:51:50 PM      PATIENT REFERRED TO:  No follow-up provider specified.     DISCHARGE MEDICATIONS:  New Prescriptions    No medications on file       DISCONTINUED MEDICATIONS:  Discontinued Medications    APIXABAN (ELIQUIS) 5 MG TABS TABLET    Take 5 mg by mouth 2 times daily    ASPIRIN 81 MG EC TABLET    Take 81 mg by mouth daily               (Please note that portions of this note were completed with a voice recognition program.  Efforts were made to editthe dictations but occasionally words are mis-transcribed.)    Louis Rodriguez MD (electronically signed)            Louis Rodriguez MD  12/08/22 [id number]

## 2022-12-08 NOTE — PROGRESS NOTES
Patient is now in room 4106, is A+Ox4 and has family at bedside. Orientation to the room was provided, all fall precautions are in place. Telemetry montiro placed on patient who is reading NSR. VSS on 3L of oxygen. Triad cream applied to sacrum for stage 2 wounds, B heel protectors on heels for protection. Will start [place] orders. New dressing placed on patient suprapubic cath. All needs meet at this time.      Electronically signed by Jaret Mckinney RN on 12/8/2022 at 4:01 PM

## 2022-12-08 NOTE — ED NOTES

## 2022-12-08 NOTE — PROGRESS NOTES
Pharmacy Medication Reconciliation Note     List of medications patient is currently taking is complete. Source of information:   1. [facility] list and nurse    Notes regarding home medications:   1. Confirmed she is no longer on aspirin or eliquis  2.  She is receiving furosemide 60 mg BID at nursing home    Denies taking any other OTC or herbal medications    Shahnaz Rahman PharmD, BCPS  12/8/2022  5:59 PM

## 2022-12-08 NOTE — PROGRESS NOTES
Family called this RN and stated that patient is much more lethargic then she was in the ED. The patient is very lethargic but awakens to stimulus, when awake she is alert and orientated x4 and states she just wants to sleep. She is showing no signs of stoke. This RN messaged MD Santiago - mentioned PCO2 in ED was 70.6 from [facility] in the ED. New order placed for ABG - this RN called respiratory to notify. Family and patient was updated.      Electronically signed by Jorgito Ferrell RN on 12/8/2022 at 6:29 PM

## 2022-12-09 LAB
ANION GAP SERPL CALCULATED.3IONS-SCNC: 8 MMOL/L (ref 3–16)
BUN BLDV-MCNC: 11 MG/DL (ref 7–20)
CALCIUM SERPL-MCNC: 8.5 MG/DL (ref 8.3–10.6)
CHLORIDE BLD-SCNC: 103 MMOL/L (ref 99–110)
CO2: 30 MMOL/L (ref 21–32)
CREAT SERPL-MCNC: 0.6 MG/DL (ref 0.6–1.2)
GFR SERPL CREATININE-BSD FRML MDRD: >60 ML/MIN/{1.73_M2}
GLUCOSE BLD-MCNC: 99 MG/DL (ref 70–99)
POTASSIUM REFLEX MAGNESIUM: 3.9 MMOL/L (ref 3.5–5.1)
SODIUM BLD-SCNC: 141 MMOL/L (ref 136–145)

## 2022-12-09 PROCEDURE — 94761 N-INVAS EAR/PLS OXIMETRY MLT: CPT

## 2022-12-09 PROCEDURE — 6370000000 HC RX 637 (ALT 250 FOR IP): Performed by: INTERNAL MEDICINE

## 2022-12-09 PROCEDURE — 97166 OT EVAL MOD COMPLEX 45 MIN: CPT

## 2022-12-09 PROCEDURE — 2700000000 HC OXYGEN THERAPY PER DAY

## 2022-12-09 PROCEDURE — 92610 EVALUATE SWALLOWING FUNCTION: CPT

## 2022-12-09 PROCEDURE — 80048 BASIC METABOLIC PNL TOTAL CA: CPT

## 2022-12-09 PROCEDURE — 97535 SELF CARE MNGMENT TRAINING: CPT

## 2022-12-09 PROCEDURE — 1200000000 HC SEMI PRIVATE

## 2022-12-09 PROCEDURE — 2580000003 HC RX 258: Performed by: INTERNAL MEDICINE

## 2022-12-09 PROCEDURE — 6360000002 HC RX W HCPCS: Performed by: INTERNAL MEDICINE

## 2022-12-09 PROCEDURE — 97162 PT EVAL MOD COMPLEX 30 MIN: CPT

## 2022-12-09 PROCEDURE — 97530 THERAPEUTIC ACTIVITIES: CPT

## 2022-12-09 PROCEDURE — 36415 COLL VENOUS BLD VENIPUNCTURE: CPT

## 2022-12-09 RX ORDER — GLUCOSAMINE HCL 500 MG
800 TABLET ORAL DAILY
Status: DISCONTINUED | OUTPATIENT
Start: 2022-12-09 | End: 2022-12-09 | Stop reason: RX

## 2022-12-09 RX ORDER — LISINOPRIL 10 MG/1
5 TABLET ORAL DAILY
Status: DISCONTINUED | OUTPATIENT
Start: 2022-12-09 | End: 2022-12-13 | Stop reason: HOSPADM

## 2022-12-09 RX ORDER — VERAPAMIL HYDROCHLORIDE 240 MG/1
120 TABLET, FILM COATED, EXTENDED RELEASE ORAL NIGHTLY
Status: DISCONTINUED | OUTPATIENT
Start: 2022-12-09 | End: 2022-12-09

## 2022-12-09 RX ORDER — HYDROXYZINE PAMOATE 25 MG/1
25 CAPSULE ORAL 3 TIMES DAILY PRN
Status: DISCONTINUED | OUTPATIENT
Start: 2022-12-09 | End: 2022-12-13 | Stop reason: HOSPADM

## 2022-12-09 RX ORDER — DOCUSATE SODIUM 100 MG/1
100 CAPSULE, LIQUID FILLED ORAL DAILY
Status: DISCONTINUED | OUTPATIENT
Start: 2022-12-09 | End: 2022-12-13 | Stop reason: HOSPADM

## 2022-12-09 RX ORDER — PRAVASTATIN SODIUM 20 MG
20 TABLET ORAL NIGHTLY
Status: DISCONTINUED | OUTPATIENT
Start: 2022-12-09 | End: 2022-12-13 | Stop reason: HOSPADM

## 2022-12-09 RX ORDER — SIMETHICONE 80 MG
80 TABLET,CHEWABLE ORAL 2 TIMES DAILY PRN
Status: DISCONTINUED | OUTPATIENT
Start: 2022-12-09 | End: 2022-12-13 | Stop reason: HOSPADM

## 2022-12-09 RX ORDER — SENNA PLUS 8.6 MG/1
1 TABLET ORAL DAILY
Status: DISCONTINUED | OUTPATIENT
Start: 2022-12-09 | End: 2022-12-13 | Stop reason: HOSPADM

## 2022-12-09 RX ADMIN — CARBIDOPA AND LEVODOPA 2 TABLET: 25; 100 TABLET ORAL at 17:36

## 2022-12-09 RX ADMIN — HYDROXYZINE PAMOATE 25 MG: 25 CAPSULE ORAL at 21:12

## 2022-12-09 RX ADMIN — RIVASTIGMINE TARTRATE 1.5 MG: 1.5 CAPSULE ORAL at 21:12

## 2022-12-09 RX ADMIN — RIVASTIGMINE TARTRATE 1.5 MG: 1.5 CAPSULE ORAL at 08:51

## 2022-12-09 RX ADMIN — ACETAMINOPHEN 650 MG: 325 TABLET, FILM COATED ORAL at 21:20

## 2022-12-09 RX ADMIN — CARBIDOPA AND LEVODOPA 2 TABLET: 25; 100 TABLET ORAL at 08:51

## 2022-12-09 RX ADMIN — SENNOSIDES 8.6 MG: 8.6 TABLET, FILM COATED ORAL at 09:42

## 2022-12-09 RX ADMIN — CEFEPIME 2000 MG: 2 INJECTION, POWDER, FOR SOLUTION INTRAVENOUS at 14:02

## 2022-12-09 RX ADMIN — LISINOPRIL 5 MG: 10 TABLET ORAL at 09:42

## 2022-12-09 RX ADMIN — CARBIDOPA AND LEVODOPA 2 TABLET: 25; 100 TABLET ORAL at 11:35

## 2022-12-09 RX ADMIN — PRAVASTATIN SODIUM 20 MG: 20 TABLET ORAL at 21:11

## 2022-12-09 RX ADMIN — CARBIDOPA AND LEVODOPA 2 TABLET: 25; 100 TABLET ORAL at 05:55

## 2022-12-09 RX ADMIN — CARBIDOPA AND LEVODOPA 2 TABLET: 25; 100 TABLET ORAL at 15:17

## 2022-12-09 RX ADMIN — DOCUSATE SODIUM 100 MG: 100 CAPSULE, LIQUID FILLED ORAL at 09:42

## 2022-12-09 RX ADMIN — ACETAMINOPHEN 650 MG: 325 TABLET, FILM COATED ORAL at 02:17

## 2022-12-09 RX ADMIN — ENOXAPARIN SODIUM 40 MG: 100 INJECTION SUBCUTANEOUS at 21:11

## 2022-12-09 RX ADMIN — CARBIDOPA AND LEVODOPA 1 TABLET: 50; 200 TABLET, EXTENDED RELEASE ORAL at 21:14

## 2022-12-09 ASSESSMENT — PAIN SCALES - GENERAL
PAINLEVEL_OUTOF10: 5
PAINLEVEL_OUTOF10: 3
PAINLEVEL_OUTOF10: 5
PAINLEVEL_OUTOF10: 0

## 2022-12-09 ASSESSMENT — PAIN DESCRIPTION - LOCATION
LOCATION: HEAD;BACK
LOCATION: BACK;HEAD
LOCATION: HEAD

## 2022-12-09 ASSESSMENT — PAIN DESCRIPTION - DESCRIPTORS: DESCRIPTORS: ACHING

## 2022-12-09 NOTE — CARE COORDINATION
INITIAL CASE MANAGEMENT ASSESSMENT    Met with patient to assess possible discharge needs. Explained Case Management role/services. Living Situation: [place] care [place] at [facility]. ADLs: Ambulates with a FWW, dependent for ADL's and IADL's. DME: FWW    PT/OT Recs: Recommendations for patient to return to LTC at facility. Active Services: N/A     Transportation: Transportation is provided by the facility. Medications: Facility provided. PCP: Chantell Bragg (facility provider)      HD/PD: N/A    PLAN/COMMENTS: Discharge plan is to return to [facility]. Confirmed with the patient and family at bedside. Unable to reach admissions to determine if a rapid covid test will be needed. Will require stretcher transportation. Provided contact information for patient or family to call with any questions. Will follow and assist as needed.     Irineo CONTRERAS RN  Case Management  [phone]    Electronically signed by Irineo Underwood RN on 12/9/2022 at 5:57 PM

## 2022-12-09 NOTE — PROGRESS NOTES
[facility]   Herbal and Nutritional Product Restrictions      The following herbal, alternative, and/or nutritional/dietary supplement product(s) has been discontinued per [facility] approved policy:      Cranberry 800 mg tablet daily    Please reorder upon discharge if appropriate.     Thank you,  Johana Koch [id number], [facility]  12/9/2022 9:32 AM

## 2022-12-09 NOTE — PROGRESS NOTES
4 Eyes Skin Assessment     NAME:  Loree Reynoso  YOB: 1935  MEDICAL RECORD NUMBER:  [id number]    The patient is being assessed for  Admission    I agree that One RN have performed a thorough Head to Toe Skin Assessment on the patient. ALL assessment sites listed below have been assessed. Areas assessed by both nurses:    Head, Face, Ears, Shoulders, Back, Chest, Arms, Elbows, Hands, Sacrum. Buttock, Coccyx, Ischium, and Legs. Feet and Heels        Does the Patient have a Wound? Yes wound(s) were present on assessment.  LDA wound assessment was Initiated and completed by RN       Raheem Prevention initiated by RN: Yes   Wound Care Orders initiated by RN: No    Pressure Injury (Stage 3,4, Unstageable, DTI, NWPT, and Complex wounds) if present place referral order by RN under : No    New and Established Ostomies, if present place, referral order under : No      Nurse 1 eSignature: Electronically signed by Abby Kelly RN on 12/9/22 at 7:33 AM EST    **SHARE this note so that the co-signing nurse is able to place an eSignature**    Nurse 2 eSignature: Electronically signed by Anjali Stone RN on 12/9/22 at 5:40 PM EST

## 2022-12-09 NOTE — ACP (ADVANCE CARE PLANNING)
Advance Care Planning     Advance Care Planning Activator (Inpatient)  Conversation Note      Date of ACP Conversation: 12/9/2022     Conversation Conducted with:  Healthcare Decision Maker: Next of Kin by law (only applies in absence of above) (name) Radha Acevedo     ACP Activator: Moisés Desai RN    Health Care Decision Maker:     Current Designated Health Care Decision Maker:     Primary Decision Maker: Lisette Bryan Child - [phone]    Care Preferences    Ventilation: \"If you were in your present state of health and suddenly became very ill and were unable to breathe on your own, what would your preference be about the use of a ventilator (breathing machine) if it were available to you? \"      Would the patient desire the use of ventilator (breathing machine)?: no    \"If your health worsens and it becomes clear that your chance of recovery is unlikely, what would your preference be about the use of a ventilator (breathing machine) if it were available to you? \"     Would the patient desire the use of ventilator (breathing machine)?: No      Resuscitation  \"CPR works best to restart the heart when there is a sudden event, like a heart attack, in someone who is otherwise healthy. Unfortunately, CPR does not typically restart the heart for people who have serious health conditions or who are very sick. \"    \"In the event your heart stopped as a result of an underlying serious health condition, would you want attempts to be made to restart your heart (answer \"yes\" for attempt to resuscitate) or would you prefer a natural death (answer \"no\" for do not attempt to resuscitate)? \" no       [] Yes   [x] No   Educated Patient / Ralph Vergara regarding differences between Advance Directives and portable DNR orders.     Length of ACP Conversation in minutes:  5    Conversation Outcomes:  [x] ACP discussion completed  [] Existing advance directive reviewed with patient; no changes to patient's previously recorded wishes  [] New Advance Directive completed  [] Portable Do Not Rescitate prepared for Provider review and signature  [] POLST/POST/MOLST/MOST prepared for Provider review and signature      Follow-up plan:    [] Schedule follow-up conversation to continue planning  [] Referred individual to Provider for additional questions/concerns   [] Advised patient/agent/surrogate to review completed ACP document and update if needed with changes in condition, patient preferences or care setting    [] This note routed to one or more involved healthcare providers    Boni CONTRERAS RN  Case Management  [phone]    Electronically signed by Boni Hall RN on 12/9/2022 at 6:03 PM

## 2022-12-09 NOTE — PLAN OF CARE
Problem: Pain  Goal: Verbalizes/displays adequate comfort level or baseline comfort level  12/8/2022 2241 by Saul Cobb RN  Outcome: Progressing  12/8/2022 1609 by Jessica Carmona RN  Outcome: Progressing  12/8/2022 1609 by Jessica Caromna RN  Outcome: Progressing     Problem: Skin/Tissue Integrity  Goal: Absence of new skin breakdown  Description: 1. Monitor for areas of redness and/or skin breakdown  2. Assess vascular access sites hourly  3. Every 4-6 hours minimum:  Change oxygen saturation probe site  4. Every 4-6 hours:  If on nasal continuous positive airway pressure, respiratory therapy assess nares and determine need for appliance change or resting period.   12/8/2022 2241 by Saul Cobb RN  Outcome: Progressing  12/8/2022 1609 by Jessica Carmona RN  Outcome: Progressing  12/8/2022 1609 by Jessica Carmona RN  Outcome: Progressing     Problem: Safety - Adult  Goal: Free from fall injury  12/8/2022 2241 by Saul Cobb RN  Outcome: Progressing  12/8/2022 1609 by Jessica Carmona RN  Outcome: Progressing  12/8/2022 1609 by Jessica Carmona RN  Outcome: Progressing

## 2022-12-09 NOTE — PROGRESS NOTES
Physical Therapy  Facility/Department: [address] MED SURG  Physical Therapy Initial Assessment  If patient discharges prior to next session this note will serve as a discharge summary. Please see below for the latest assessment towards goals. Name: Staci Peters  : 1935  MRN: [id number]  Date of Service: 2022    Discharge Recommendations:  [address] [facility] with PT, Patient would benefit from continued therapy after discharge   Staci Peters scored a 10/24 on the AM-PAC short mobility form. Current research shows that an AM-PAC score of 17 or less is typically not associated with a discharge to the patient's home setting. Based on the patient's AM-PAC score and their current functional mobility deficits, it is recommended that the patient have 3-5 sessions per week of Physical Therapy at d/c to increase the patient's independence. Please see assessment section for further patient specific details. PT Equipment Recommendations  Equipment Needed: No      Patient Diagnosis(es): The primary encounter diagnosis was Altered mental status, unspecified altered mental status type. A diagnosis of Urinary tract infection associated with cystostomy catheter, initial encounter [facility]) was also pertinent to this visit. Past Medical History:  has a past medical history of Contraindication to anticoagulation therapy, Hiatal hernia, High blood pressure, Neurogenic bladder, Parkinson disease ([address] pulmonary embolism, and Suprapubic catheter ([facility] [address] Past Surgical History:  has a past surgical history that includes Tonsillectomy and adenoidectomy (); Appendectomy (); Dilation and curettage of uterus ( 60 62 63); Hysterectomy (); Vagina surgery (); Abdomen surgery; Cystoscopy (N/A, 2020); IVC filter insertion (Right, 2020); and Upper gastrointestinal endoscopy (N/A, 2021).     Assessment   Assessment: The pt is an 83 yo female who presented to the ED with confusion from her LTC facility. The pt has a suprapubic catheter which was exchanged 2 weeks ago but they are concerned for a UTI. The pt reports she is now in LTC at [facility] and she requires assist for all transfers, she is mostly w/c bound and gets assist for all self-care; she is able to feed her self with set-up. PMHx: HTN, hiatal hernia, neurogenic bladder with suprapubic catheter; Parkinson's, PE, IVC filter       Today, the pt demonstrated that she is most likely functioning close to her baseline: she required max A of 2 for supine > sit; CGA for seated EOB and min A of 2 for a sit <> stand transfer. The stedy was used to get the pt to the commode and then to the chair today for safety. Anticipate that the pt will return to her LTC facility at d/c and would benefit from con't skilled PT to maximize her functional potential. Will con't to follow. Therapy Prognosis: Good  Decision Making: Medium Complexity  Barriers to Learning: none  Requires PT Follow-Up: Yes  Activity Tolerance  Activity Tolerance: Patient tolerated evaluation without incident     Plan   Physcial Therapy Plan  General Plan: 3-5 times per week  Current Treatment Recommendations: Strengthening, Balance training, Functional mobility training, Transfer training, Therapeutic activities, Patient/Caregiver education & training, Safety education & training  Safety Devices  Type of Devices: Chair alarm in place, Gait belt, Call light within reach, Left in chair     Restrictions  Restrictions/Precautions  Restrictions/Precautions: Fall Risk  Position Activity Restriction  Other position/activity restrictions: 3L O2     Subjective   General  Chart Reviewed: Yes  Additional Pertinent Hx: Per Ted Rodriguez MD H&P on 12-8-2022: The pt is an 83 yo female who presented to the ED with confusion from her LTC facility. The pt has a suprapubic catheter which was exchanged 2 weeks ago but they are concerned for a UTI.     PMHx: HTN, hiatal hernia, neurogenic bladder with suprapubic catheter; Parkinson's, PE, IVC filter  Response To Previous Treatment: Not applicable  Family / Caregiver Present: No  Referring Practitioner: Denilson Watson MD  Referral Date : 12/08/22  Diagnosis: Acute encephalopathy unknown etiology; concern for UTI ; generalized weakness  Follows Commands: Within Functional Limits  Subjective  Subjective: the pt was found to be in the bed; she was awake and repoting her feet are cold but her face is hot; fan turned on for the pt         Social/Functional History  Social/Functional History  Type of Home: Facility ([facility])  Home Access: Elevator  Bathroom Shower/Tub: Walk-in shower, Shower chair with back  Bathroom Toilet: Handicap height  Bathroom Equipment: Grab bars in shower, Grab bars around toilet  Bathroom Accessibility: Accessible  Home Equipment: Juanna Kenning, 4 wheeled, Leg , Wheelchair-manual, Reacher, Sock aid, Long-handled shoehorn, Oxygen (adjustable bed wtih bedrails, heel lift in shoes; 2L O2)  Has the patient had two or more falls in the past year or any fall with injury in the past year?: Yes (slid off shower chair and off the edge of the bed)  ADL Assistance: Needs assistance (assist for bathing, dressing and toileting after a BM, suprapubic catheter; able to feed self after set-up)  Homemaking Assistance: Needs assistance  Homemaking Responsibilities: No  Ambulation Assistance:  (uses the w/c mostly now; the pt does very little propulsion on her own)  Transfer Assistance: Needs assistance (pivot transfers with assist)  Active : No  Occupation: Retired  Vision/Hearing  Vision  Vision: Impaired  Vision Exceptions: Wears glasses at all times (mac degen)  Hearing  Hearing: Within functional limits    Cognition   Orientation  Overall Orientation Status: Impaired  Orientation Level: Oriented to person;Disoriented to time;Oriented to place; Disoriented to situation (correct year but thought it was November still)  Cognition  Overall Cognitive Status: WFL     Objective           Gross Assessment  AROM: Generally decreased, functional  Strength: Generally decreased, functional  Tone: Abnormal  Sensation: Intact     Bed mobility  Supine to Sit: Maximum assistance;2 Person assistance  Sit to Supine: Unable to assess  Scooting: Maximal assistance;2 Person assistance  Transfers  Sit to Stand: Minimal Assistance;2 Person Assistance  Stand to Sit: Minimal Assistance  Bed to Chair: Dependent/Total (with stedy)  Comment: the stedy was used to get the pt from the bed > commode > chair  Ambulation  Comments: mostly non-ambulatory at baseline; only walks with assist and a walker; uses a w/c and is mostly dependent for w/c mobility     Balance  Comments: CGA for seated EOB and min A for static standing in the stedy for yair-care following a BM           AM-PAC Score  AM-PAC Inpatient Mobility Raw Score : 10 (12/09/22 0822)  AM-PAC Inpatient T-Scale Score : 32.29 (12/09/22 0822)  Mobility Inpatient CMS 0-100% Score: 76.75 (12/09/22 0694)  Mobility Inpatient CMS G-Code Modifier : CL (12/09/22 7154)          Goals  Short Term Goals  Time Frame for Short Term Goals: upon d/c  Short Term Goal 1: Bed mobility with mod A of 1-2. Short Term Goal 2: Pivot transfer bed <> chair with mod A of 1-2. Short Term Goal 3: Seated EOB with SBA.   Patient Goals   Patient Goals : none stated       Education  Patient Education  Education Given To: Patient  Education Provided: Role of Therapy;Plan of Care  Education Method: Verbal  Barriers to Learning: None  Education Outcome: Demonstrated understanding;Continued education needed      Therapy Time   Individual Concurrent Group Co-treatment   Time In [phone]         Time Out 0829         Minutes 55         Timed Code Treatment Minutes: 40 Minutes     Electronically signed by Lisa Bright, PT [id number] on 12/9/2022 at 8:32 AM

## 2022-12-09 NOTE — PROGRESS NOTES
Occupational Therapy  Facility/Department: [facility] MED SURG  Occupational Therapy Initial Assessment    Name: Valentin Ibarra  : 1935  MRN: [id number]  Date of Service: 2022    Discharge Recommendations:  [address] with OT, Patient would benefit from continued therapy after discharge        Valentin Ibarra scored a  on the AM-PAC ADL Inpatient form. Current research shows that an AM-PAC score of 17 or less is typically not associated with a discharge to the patient's home setting. Based on the patient's AM-PAC score and their current ADL deficits, it is recommended that the patient have 3-5 sessions per week of Occupational Therapy at d/c to increase the patient's independence. Please see assessment section for further patient specific details. If patient discharges prior to next session this note will serve as a discharge summary. Please see below for the latest assessment towards goals. Patient Diagnosis(es): The primary encounter diagnosis was Altered mental status, unspecified altered mental status type. A diagnosis of Urinary tract infection associated with cystostomy catheter, initial encounter [facility]) was also pertinent to this visit. Past Medical History:  has a past medical history of Contraindication to anticoagulation therapy, Hiatal hernia, High blood pressure, Neurogenic bladder, Parkinson disease ([address] pulmonary embolism, and Suprapubic catheter ([facility] [address] Past Surgical History:  has a past surgical history that includes Tonsillectomy and adenoidectomy (); Appendectomy (); Dilation and curettage of uterus ( 60 62 63); Hysterectomy (); Vagina surgery (); Abdomen surgery; Cystoscopy (N/A, 2020); IVC filter insertion (Right, 2020); and Upper gastrointestinal endoscopy (N/A, 2021). Assessment   Performance deficits / Impairments: Decreased functional mobility ; Decreased ADL status; Decreased strength;Decreased endurance;Decreased balance;Decreased vision/visual deficit; Decreased fine motor control  Assessment: Pt is an 82 y.o. female admitted with Acute encephalopathy, suspected UTI, generalized weakness. PTA, pt living in [place]TC at [facility], has assist for majority of ADLs and assist x1 for pivot transfer to w/c. Pt mostly uses w/c, reports ambulates short distances with assist using 4WW. Pt currently functioning below baseline d/t the above deficits, today requiring max A x2 for bed mobility, min A x2 for fxl transfer with ana stedy lift, total A toileting, set-up feeding, and anticipate pt would require overall max A for ADLs. Pt demonstrates need for ongoing skilled OT at d/c to maximize pt's safety and independence. Prognosis: Fair  Decision Making: Medium Complexity  History: see above  REQUIRES OT FOLLOW-UP: Yes  Activity Tolerance  Activity Tolerance: Patient limited by fatigue        Plan   Occupational Therapy Plan  Times Per Week: 3-5  Current Treatment Recommendations: Strengthening, ROM, Balance training, Functional mobility training, Endurance training, Safety education & training, Self-Care / ADL     Restrictions  Restrictions/Precautions  Restrictions/Precautions: Fall Risk  Position Activity Restriction  Other position/activity restrictions: 3L O2    Subjective   General  Chart Reviewed: Yes  Additional Pertinent Hx: Pt is an 82 y.o. female presenting to ED from nursing home with confusion. Pt admitted with Acute encephalopathy, suspected UTI, generalized weakness. Family / Caregiver Present: No  Referring Practitioner: Madeline Do MD  Diagnosis: Acute encephalopathy, suspected UTI, generalized weakness. Subjective  Subjective: Pt met b/s for OT eval/cotx with PT. Pt in bed on arrival, agreeable to participate in therapy. Pt denies pain, c/o feet being cold and face being hot.      Social/Functional History  Social/Functional History  Type of Home: Facility ([facility])  Home Access: Elevator  Bathroom Shower/Tub: Walk-in shower, Shower chair with back  [employer]: Handicap height  Bathroom Equipment: Grab bars in shower, Grab bars around toilet  Bathroom Accessibility: Accessible  Home Equipment: Jovani Sciara, 4 wheeled, Leg , Wheelchair-manual, Reacher, Sock aid, Long-handled shoehorn, Oxygen (adjustable bed wtih bedrails, heel lift in shoes; 2L O2)  Has the patient had two or more falls in the past year or any fall with injury in the past year?: Yes (slid off shower chair and off the edge of the bed)  ADL Assistance: Needs assistance (assist for bathing, dressing and toileting after a BM, suprapubic catheter; able to feed self after set-up)  Homemaking Assistance: Needs assistance  Homemaking Responsibilities: No  Ambulation Assistance:  (uses the w/c mostly now; the pt does very little propulsion on her own)  Transfer Assistance: Needs assistance (pivot transfers with assist)  Active : No  Occupation: Retired       Objective     Safety Devices  Type of Devices: Chair alarm in place;Gait belt;Call light within reach; Left in chair    Balance  Sitting:  (seated EOB with CGA)  Standing:  (Min A in ana stedy)    Transfers  Sit to stand: Minimal assistance;2 Person assistance (EOB and commode >ana stedy)  Stand to sit: Minimal assistance;2 Person assistance (ana stedy >recliner)  Toilet Transfers  Toilet Transfer: Minimal assistance;2 Person assistance    AROM: Within functional limits  Strength: Generally decreased, functional  Coordination: Generally decreased, functional  Sensation:  (pt reports kaelyn feet cold, but otherwise denies N/T)    ADL  Feeding: Setup  Toileting Skilled Clinical Factors: SPC, BM at commode, total A for hygiene standing in ana stedy with  min A for balance  Additional Comments: Anticipate pt is max A bathing and dressing based on ROM/strength, balance, endurance.     Activity Tolerance  Activity Tolerance: Patient tolerated evaluation without incident    Bed mobility  Supine to Sit: Maximum assistance;2 Person assistance  Sit to Supine: Unable to assess  Scooting: Maximal assistance;2 Person assistance    Vision  Vision: Impaired  Vision Exceptions: Wears glasses at all times (mac degen)  Hearing  Hearing: Within functional limits    Cognition  Overall Cognitive Status: WFL  Orientation  Overall Orientation Status: Impaired  Orientation Level: Oriented to person;Disoriented to time;Oriented to place; Disoriented to situation (correct year but thought it was November still)    Education Given To: Patient  Education Provided: Role of Therapy;Plan of Care;ADL Adaptive Strategies;Transfer Training  Education Outcome: Verbalized understanding;Demonstrated understanding                            AM-PAC Score        AM-PAC Inpatient Daily Activity Raw Score: 12 (12/09/22 0826)  AM-PAC Inpatient ADL T-Scale Score : 30.6 (12/09/22 0826)  ADL Inpatient CMS 0-100% Score: 66.57 (12/09/22 0826)  ADL Inpatient CMS G-Code Modifier : CL (12/09/22 6249)           Goals  Short Term Goals  Time Frame for Short Term Goals: Prior to d/c:  Short Term Goal 1: Pt complete ADL transfer with min A. Short Term Goal 2: Pt will tolerate standing >3 minutes for functional task with CGA. Short Term Goal 3: Pt will complete 10 reps of B UE therex in all planes to improve strength for ADLs. Short Term Goal 4: Pt will complete grooming from w/c level at sink with set-up/SBA.   Long Term Goals  Time Frame for Long Term Goals : STGs=LTGs  Patient Goals   Patient goals : none stated       Therapy Time   Individual Concurrent Group Co-treatment   Time In [address] [place] [address]         Time Out 0829         Minutes 55         Timed Code Treatment Minutes: [address] [facility] [place], OTR/L 5620

## 2022-12-09 NOTE — PROGRESS NOTES
Facility/Department: [facility] SURG  Initial Assessment  DYSPHAGIA BEDSIDE SWALLOW EVALUATION     Patient: Josefina Hahn   : 1935   MRN: [id number]      Evaluation Date: 2022   Admitting Diagnosis: Altered mental status, unspecified altered mental status type [R41.82]  Urinary tract infection associated with cystostomy catheter, initial encounter ([facility] [address] [T83.510A, N39.0]  AMS (altered mental status) [R41.82]  Pain: Denies                                                       Chart Review:   H&P:   Chief Complaint: Altered mental status  History obtained from patient, patient's daughter, medical records and the ER physician. Josefina Hahn is a 82 y.o. female with PMH as below presented to the ER with chief complaints of confusion this morning. According to the patient's daughter she received a call from the nursing home that the patient was noted to be too drowsy today. According to the patient she did not have a good dinner last night and this morning when she went to the restroom she was unable to get up and they brought her to the ER. According to the daughter she talked to the patient yesterday and she was talking fine. While he was in the ER the patient received IV fluids and has already started to improve and according to the daughter she was coming back to her baseline. Acute encephalopathy unknown etiology. Concern for UTI but the patient has suprapubic catheter and can have colonization. We will give cefepime 2 cultures are back. In the past she has grown E. coli and Pseudomonas. Suprapubic catheter was exchanged 2 weeks ago. Symptom has already improved with IV fluids  Dementia Parkinson's. Resume home medications Sinemet, sertraline and rivastigmine  Generalized weakness. Consult PT and OT  Hypertension. Currently holding blood pressure medications. Resume in a.m.        Current Diet Level (prior to evaluation): Regular texture diet  Thin liquids    Respiratory Status: []Room Air   [x]O2 via nasal cannula   []Other:    Imaging:  Chest X-ray: 12/8/2022  Impression   Increased bibasilar airspace disease, either atelectasis or pneumonia         Head CT: 12/8/2022  Impression   No acute intracranial abnormality. Mild-to-moderate senescent changes with parenchymal volume loss and chronic   small vessel ischemic changes. Modified Barium Swallow Study: unable to locate in EMR review, however pt reports MBS completed within the last few years by mobile service. She reports recommendations for regular/thin liquids    Reason for referral: SLP evaluation orders received due to daughter concern for dysphagia    History/Prior Level of Function:   Living Status: pt lives at [address] [place] [address] [place]: regular/thin  Prior Dysphagia History: pt reports dysphagia evaluations and MBS completed in past with no dysphagia tx or diet modifications      Dysphagia Impressions/Diagnosis: Oropharyngeal Dysphagia   OROPHARYNGEAL DYSPHAGIA DIAGNOSIS:   Pt pleasant, cooperative and accepted assessment. Pt upright in recliner, son at bedside. Mild braydon-pharyngeal dysphagia characterized by mild lingual weakness, mild lingual residue with solids, suspected premature spillage to the pharynx, suspected delayed initiation, suspected reduced laryngeal elevation, NO s/s aspiration with PO trials. Recommend continue current diet of regular/thin at this time with monitoring for any s/s aspiration or changes in respiratory status. SLP to follow 1-2x to ensure diet texture/tolerance. 2.  MEDICAL OR COGNITIVE/BEHAVIORAL FACTORS WHICH CAN EXACERBATE:   Baseline dx of Parkinson's Disease  Silent aspiration cannot be ruled out during a bedside assessment. If concerns for aspiration or changes in respiratory status, MBS may be indicated. Recommended Diet and Intervention 12/9/2022:  Diet Solids Recommendation:  Regular texture diet  Liquid Consistency Recommendation:   Thin liquids  Recommended form of Meds: Meds whole with water     Compensatory Swallowing Strategies:  Upright as possible with all PO intake , Small bites/sips , Eat/feed slowly, Remain upright 30-45 min     SHORT TERM DYSPHAGIA GOALS/PLAN OF CARE: Speech therapy for dysphagia tx 1-2 times to ensure diet tolerance. Goals  Pt will functionally tolerate recommended diet with no overt clinical s/s of aspiration   Pt will demonstrate understanding of aspiration risk and precautions via education/demonstration with occasional prompting       Dysphagia Therapeutic Intervention:  Diet Tolerance Monitoring , Patient/Family Education     Dysphagia Prognosis: [x] good []fair  []guarded []poor  Barriers to progress: medical dx Parkinson's    Discharge Recommendations: Discharge recommendations to be determined pending ongoing follow-up during acute care stay      TEST DATA  Vision: [facility] for assessment  Hearing: [facility] for assessment    Consistencies Presented:    Thin liquid; via straw; rapid ingestion/consecutive sips, suspect premature bolus loss to pharynx, suspect delayed initiation, suspect reduced laryngeal elevation, NO s/s aspiration  Puree food; adequate oral clearance, suspect delayed initiation, suspect reduced laryngeal elevation, NO s/s aspiration  Easy to chew food; mild lingual residue that pt was sensate to and able to clear with additional swallow, mild prolonged mastication, suspected delayed initiation, suspected reduced laryngeal elevation, NO s/s aspiration  Regular solid food; mild lingual residue that pt was sensate to and able to clear with additional swallow, mild prolonged mastication, suspected delayed initiation, suspected reduced laryngeal elevation, NO s/s aspiration    Cognitive/behavioral:   [x]orientation [x] to self [x] place [] date [x] reason for admit [x] purpose of evaluation  []distractible  [x]verbally responsive  [x]follows one step commands  []agitated  []impulsive  [] other:     Patient Positioning: Upright in chair    Dentition:  [x]Adequate  []Dentures   []Missing Many Teeth  []Edentulous  [x]Other: missing a few teeth    Baseline Vocal Quality:  [x]Normal; presbyphonic  []Dysphonic   []Aphonic   []Hoarse  []Wet  []Weak  []Other:    Volitional Cough:  [x]Strong  []Weak  []Wet  []Absent  []Congested  []Other:    Volitional Swallow:   []Absent   [x]Delayed     []Adequate     []Required use of drink     Oral Mechanism Exam:  []WFL [x]Mild   [] Moderate  []Severe  []To be assessed  Impaired:   []Left side      []Right side    []Labial ROM/Coordination    []Labial Symmetry   []Lingual ROM/Coordination   []Lingual Symmetry  []Gag  [x]Other: mildly reduced lingual strength    Oral Phase: []WFL [x]Mild   [] Moderate  []Severe  []To be assessed   [x]Impaired/Prolonged Mastication: with solids   []Spillage Left:   []Spillage Right:  []Pocketing Left:   []Pocketing Right:   []Decreased Anterior to Posterior Transit:   []Suspected Premature Bolus Loss:   [x]Lingual/Palatal Residue: mild with noted sensation and clearance   []Other:     Pharyngeal Phase: []WFL [x]Mild   [] Moderate  []Severe  []To be assessed   [x]Delayed Swallow:   [x]Suspected Pharyngeal Pooling:   [x]Decreased Laryngeal Elevation:   []Absent Swallow:  []Wet Vocal Quality:   []Throat Clearing-Immediate:   []Throat Clearing-Delayed:   []Cough-Immediate:   []Cough-Delayed:  []Change in Vital Signs:  []Suspected Delayed Pharyngeal Clearing:  []Other:       Eating Assistance:  []Independent  [x]Setup or clean-up assistance   [] Supervision or touching assistance   [] Partial or moderate assistance   [] Substantial or maximal assistance  [] Dependent       EDUCATION:   Provided education regarding role of SLP, results of assessment, recommendations and general speech pathology plan of care. [x] Pt verbalized understanding and agreement   [x] Pt requires ongoing learning   [] No evidence of comprehension   Discussed recommendations with son at bedside as well.     If

## 2022-12-09 NOTE — PROGRESS NOTES
12/09/22 1513   Encounter Summary   Encounter Overview/Reason  Initial Encounter;Spiritual/Emotional Needs   Service Provided For: Patient and family together   Referral/Consult From: Other    Support System Children   Last Encounter  12/09/22  (sos, fr cm 12/19)   Complexity of Encounter Moderate   Begin Time 1500   Encounter    Type Initial Screen/Assessment   Spiritual/Emotional needs   Type Spiritual Support   Rituals, Rites and Sacraments   Type Sacrament of Sick; Anointing   Assessment/Intervention/Outcome   Assessment Calm;Coping; Hopeful   Intervention Active listening;Discussed belief system/[employer]ist practices/sharon; Explored/Affirmed feelings, thoughts, concerns;Explored Coping Skills/Resources;Prayer (assurance of)/[place];Guided Imagery, Healing touch, etc.   Outcome Coping;Engaged in conversation;Expressed feelings, needs, and concerns;Expressed feelings of Claudia, Peace and/or Love;Expressed Gratitude   Electronically signed by Arya Ohara on 12/9/2022 at 3:16 PM

## 2022-12-09 NOTE — PROGRESS NOTES
Progress Note  Admit Date: 12/8/2022      PCP: Chuyita Murguia     CC: F/U for altered mental status      Days in hospital:  1    SUBJECTIVE / Interval History:  Patient is awake and alert. Family at bedside        Allergies  Caduet [amlodipine-atorvastatin], Estrogens conjugated, Penicillins, and Terramycin [oxytetracycline-lidocaine]    Medications    Scheduled Meds:   carbidopa-levodopa  1 tablet Oral Nightly    carbidopa-levodopa  2 tablet Oral 5x Daily    rivastigmine  1.5 mg Oral BID    sodium chloride flush  5-40 mL IntraVENous 2 times per day    cefepime  2,000 mg IntraVENous Q24H    enoxaparin  40 mg SubCUTAneous Nightly     Continuous Infusions:   sodium chloride 75 mL/hr at 12/08/22 1528    sodium chloride         PRN Meds:  sodium chloride flush, sodium chloride, potassium chloride **OR** potassium alternative oral replacement **OR** potassium chloride, ondansetron **OR** ondansetron, polyethylene glycol, aluminum & magnesium hydroxide-simethicone, acetaminophen **OR** acetaminophen    Vitals    BP (!) 144/70   Pulse 69   Temp 98 °F (36.7 °C) (Oral)   Resp 18   Ht 4' 11\" (1.499 m)   Wt 177 lb 0.5 oz (80.3 kg)   SpO2 95%   BMI 35.76 kg/m²     Exam:    Gen: No distress. Eyes: PERRL. No sclera icterus. No conjunctival injection. ENT: No discharge. Pharynx clear. External appearance of ears and nose normal.  Neck: Trachea midline. No obvious mass. Resp: No accessory muscle use. No crackles. No wheezes. No rhonchi. No dullness on percussion. CV: Regular rate. Regular rhythm. No murmur or rub. No edema. GI: Non-tender. Non-distended. No hernia. Skin: Warm, dry, normal texture and turgor. No nodule on exposed extremities. Lymph: No cervical LAD. No supraclavicular LAD. M/S: No cyanosis. No clubbing. No joint deformity. Neuro: Moves all four extremities. CN 2-12 tested, no defect noted. Psych: Oriented x 3. No anxiety. Awake. Alert. Intact judgement and insight.   Suprapubic catheter present, purulent discharge present around the catheter    Data    LABS  CBC:   Recent Labs     12/08/22  1109   WBC 6.5   HGB 11.3*   HCT 35.9*   MCV 95.3        BMP:   Recent Labs     12/08/22  1109 12/09/22  0533    141   K 4.1 3.9   CL 95* 103   CO2 36* 30   BUN 16 11   CREATININE 0.8 0.6   GLUCOSE 118* 99     POC GLUCOSE:  No results for input(s): POCGLU in the last 72 hours. LIVER PROFILE:   Recent Labs     12/08/22  1109   AST 12*   ALT <5*   LABALBU 4.0   BILITOT 0.3   ALKPHOS 95     PT/INR: No results for input(s): PROTIME, INR in the last 72 hours. APTT: No results for input(s): APTT in the last 72 hours. UA:  Recent Labs     12/08/22  1111   COLORU Yellow   PHUR 7.0   WBCUA 204*   RBCUA 6*   BACTERIA 4+*   CLARITYU CLOUDY*   SPECGRAV 1.013   LEUKOCYTESUR LARGE*   UROBILINOGEN 1.0   BILIRUBINUR Negative   BLOODU TRACE*   GLUCOSEU Negative   KETUA Negative     Microbiology:  Wound Culture: No results for input(s): WNDABS, ORG in the last 72 hours. Invalid input(s):  LABGRAM  Nasal Culture: No results for input(s): ORG, MRSAPCR in the last 72 hours. Blood Culture: No results for input(s): BC, BLOODCULT2 in the last 72 hours. Fungal Culture:   No results for input(s): FUNGSM in the last 72 hours. No results for input(s): FUNCXBLD in the last 72 hours. CSF Culture:  No results for input(s): COLORCSF, APPEARCSF, CFTUBE, CLOTCSF, WBCCSF, RBCCSF, NEUTCSF, NUMCELLSCSF, LYMPHSCSF, MONOCSF, GLUCCSF, VOLCSF in the last 72 hours. Respiratory Culture:  No results for input(s): Tia Radha in the last 72 hours. AFB:No results for input(s): AFBSMEAR in the last 72 hours. Urine Culture  No results for input(s): LABURIN in the last 72 hours. RADIOLOGY:    CT Head W/O Contrast   Final Result   No acute intracranial abnormality. Mild-to-moderate senescent changes with parenchymal volume loss and chronic   small vessel ischemic changes.          XR CHEST PORTABLE   Final Result Increased bibasilar airspace disease, either atelectasis or pneumonia             CONSULTS:    IP CONSULT TO CASE MANAGEMENT  IP CONSULT TO SOCIAL WORK    ASSESSMENT AND PLAN:      Principal Problem:    AMS (altered mental status)  Resolved Problems:    * No resolved hospital problems. *    Patient is a 62-year-old female with a past medical history of dementia and Parkinson's disease, hypertension who presented from a skilled nursing facility with altered mental status. She was admitted with acute metabolic encephalopathy with possible UTI  . Acute metabolic encephalopathy  -Possibly due to a UTI. Await urine culture. Mentation improving    Possible UTI due to suprapubic catheter  -UA concerning for UTI but it could be colonized  -In the past patient had E. coli and Pseudomonas  -Catheter exchanged 2 weeks ago(patient is not sure. Will confirm with urology)  -Urine culture pending    Parkinson's dementia  -Continue home meds  -Was started on clonazepam for possible hallucinations. Will hold for now    Hypertension  -Monitor blood pressure  -Resume home lisinopril  -Patient is on Lasix at home will hold for now        DVT Prophylaxis: Lovenox  Diet: ADULT DIET; Regular; Low Fat/Low Chol/High Fiber/2 gm Na  Code Status: DNR-CCA    PT/OT Eval Status:    Discharge plan -1 to 2 days    The patient and / or the family were informed of the results of any tests, a time was given to answer questions, a plan was proposed and they agreed with plan. Discussed with consulting physicians, nursing and social work     The note was completed using EMR. Every effort was made to ensure accuracy; however, inadvertent computerized transcription errors may be present.        Elaina Payne MD

## 2022-12-09 NOTE — PLAN OF CARE
Problem: Discharge Planning  Goal: Discharge to home or other facility with appropriate resources  Outcome: Progressing     Problem: Pain  Goal: Verbalizes/displays adequate comfort level or baseline comfort level  12/9/2022 0912 by Nguyen Sanchez RN  Outcome: Progressing     Problem: Skin/Tissue Integrity  Goal: Absence of new skin breakdown  Description: 1. Monitor for areas of redness and/or skin breakdown  2. Assess vascular access sites hourly  3. Every 4-6 hours minimum:  Change oxygen saturation probe site  4. Every 4-6 hours:  If on nasal continuous positive airway pressure, respiratory therapy assess nares and determine need for appliance change or resting period.   12/9/2022 0912 by Nguyen Sanchez, RN  Outcome: Progressing     Problem: Safety - Adult  Goal: Free from fall injury  12/9/2022 0912 by Nguyen Sanchez RN  Outcome: Progressing     Problem: ABCDS Injury Assessment  Goal: Absence of physical injury  Outcome: Progressing Alert

## 2022-12-10 PROCEDURE — 1200000000 HC SEMI PRIVATE

## 2022-12-10 PROCEDURE — 6370000000 HC RX 637 (ALT 250 FOR IP): Performed by: INTERNAL MEDICINE

## 2022-12-10 PROCEDURE — 2700000000 HC OXYGEN THERAPY PER DAY

## 2022-12-10 PROCEDURE — 2580000003 HC RX 258: Performed by: INTERNAL MEDICINE

## 2022-12-10 PROCEDURE — 6360000002 HC RX W HCPCS: Performed by: INTERNAL MEDICINE

## 2022-12-10 RX ORDER — CARBIDOPA AND LEVODOPA 50; 200 MG/1; MG/1
2 TABLET, EXTENDED RELEASE ORAL NIGHTLY
Status: DISCONTINUED | OUTPATIENT
Start: 2022-12-10 | End: 2022-12-12

## 2022-12-10 RX ORDER — CLONAZEPAM 0.5 MG/1
0.5 TABLET ORAL
Status: DISCONTINUED | OUTPATIENT
Start: 2022-12-10 | End: 2022-12-13 | Stop reason: HOSPADM

## 2022-12-10 RX ADMIN — RIVASTIGMINE TARTRATE 1.5 MG: 1.5 CAPSULE ORAL at 09:58

## 2022-12-10 RX ADMIN — ENOXAPARIN SODIUM 40 MG: 100 INJECTION SUBCUTANEOUS at 20:57

## 2022-12-10 RX ADMIN — CARBIDOPA AND LEVODOPA 2.5 TABLET: 25; 100 TABLET ORAL at 15:01

## 2022-12-10 RX ADMIN — SODIUM CHLORIDE, PRESERVATIVE FREE 10 ML: 5 INJECTION INTRAVENOUS at 03:00

## 2022-12-10 RX ADMIN — CEFEPIME 2000 MG: 2 INJECTION, POWDER, FOR SOLUTION INTRAVENOUS at 13:39

## 2022-12-10 RX ADMIN — CARBIDOPA AND LEVODOPA 2 TABLET: 50; 200 TABLET, EXTENDED RELEASE ORAL at 20:56

## 2022-12-10 RX ADMIN — CLONAZEPAM 0.5 MG: 0.5 TABLET ORAL at 20:56

## 2022-12-10 RX ADMIN — CARBIDOPA AND LEVODOPA 2 TABLET: 25; 100 TABLET ORAL at 05:21

## 2022-12-10 RX ADMIN — RIVASTIGMINE TARTRATE 1.5 MG: 1.5 CAPSULE ORAL at 21:02

## 2022-12-10 RX ADMIN — CARBIDOPA AND LEVODOPA 2 TABLET: 25; 100 TABLET ORAL at 12:26

## 2022-12-10 RX ADMIN — PRAVASTATIN SODIUM 20 MG: 20 TABLET ORAL at 20:56

## 2022-12-10 RX ADMIN — CARBIDOPA AND LEVODOPA 2 TABLET: 25; 100 TABLET ORAL at 09:58

## 2022-12-10 RX ADMIN — SODIUM CHLORIDE, PRESERVATIVE FREE 10 ML: 5 INJECTION INTRAVENOUS at 21:00

## 2022-12-10 RX ADMIN — ACETAMINOPHEN 650 MG: 325 TABLET, FILM COATED ORAL at 20:56

## 2022-12-10 RX ADMIN — CARBIDOPA AND LEVODOPA 3 TABLET: 25; 100 TABLET ORAL at 18:26

## 2022-12-10 RX ADMIN — LISINOPRIL 5 MG: 10 TABLET ORAL at 09:58

## 2022-12-10 ASSESSMENT — PAIN SCALES - GENERAL: PAINLEVEL_OUTOF10: 5

## 2022-12-10 ASSESSMENT — PAIN DESCRIPTION - LOCATION: LOCATION: BACK

## 2022-12-10 ASSESSMENT — PAIN DESCRIPTION - ORIENTATION: ORIENTATION: LOWER

## 2022-12-10 ASSESSMENT — PAIN DESCRIPTION - DESCRIPTORS: DESCRIPTORS: ACHING

## 2022-12-10 ASSESSMENT — PAIN - FUNCTIONAL ASSESSMENT: PAIN_FUNCTIONAL_ASSESSMENT: PREVENTS OR INTERFERES SOME ACTIVE ACTIVITIES AND ADLS

## 2022-12-10 NOTE — PROGRESS NOTES
Patient found in bed with urine soaked sheet. Patient states she is leaking from her urethra. Suprapubic cath patent with mucous threads. Tubing clipped to bed to allow proper gravity drainage. Patient bathed, linens changed.

## 2022-12-10 NOTE — PROGRESS NOTES
Progress Note  Admit Date: 12/8/2022      PCP: Griselda Rodriguez     CC: F/U for altered mental status      Days in hospital:  2    SUBJECTIVE / Interval History:  Patient is awake and alert. Family at bedside        Allergies  Caduet [amlodipine-atorvastatin], Estrogens conjugated, Penicillins, and Terramycin [oxytetracycline-lidocaine]    Medications    Scheduled Meds:   docusate sodium  100 mg Oral Daily    senna  1 tablet Oral Daily    lisinopril  5 mg Oral Daily    pravastatin  20 mg Oral Nightly    carbidopa-levodopa  1 tablet Oral Nightly    carbidopa-levodopa  2 tablet Oral 5x Daily    rivastigmine  1.5 mg Oral BID    sodium chloride flush  5-40 mL IntraVENous 2 times per day    cefepime  2,000 mg IntraVENous Q24H    enoxaparin  40 mg SubCUTAneous Nightly     Continuous Infusions:   sodium chloride         PRN Meds:  diclofenac sodium, hydrOXYzine pamoate, simethicone, sodium chloride flush, sodium chloride, potassium chloride **OR** potassium alternative oral replacement **OR** potassium chloride, ondansetron **OR** ondansetron, polyethylene glycol, acetaminophen **OR** acetaminophen    Vitals    BP (!) 166/63   Pulse 76   Temp 98.2 °F (36.8 °C) (Oral)   Resp 18   Ht 4' 11\" (1.499 m)   Wt 177 lb 0.5 oz (80.3 kg)   SpO2 95%   BMI 35.76 kg/m²     Exam:    Gen: No distress. Eyes: PERRL. No sclera icterus. No conjunctival injection. ENT: No discharge. Pharynx clear. External appearance of ears and nose normal.  Neck: Trachea midline. No obvious mass. Resp: No accessory muscle use. No crackles. No wheezes. No rhonchi. No dullness on percussion. CV: Regular rate. Regular rhythm. No murmur or rub. No edema. GI: Non-tender. Non-distended. No hernia. Skin: Warm, dry, normal texture and turgor. No nodule on exposed extremities. Lymph: No cervical LAD. No supraclavicular LAD. M/S: No cyanosis. No clubbing. No joint deformity. Neuro: Moves all four extremities.  CN 2-12 tested, no defect noted.  Psych: Oriented x 3. No anxiety. Awake. Alert. Intact judgement and insight. Suprapubic catheter present, purulent discharge present around the catheter    Data    LABS  CBC:   Recent Labs     12/08/22  1109   WBC 6.5   HGB 11.3*   HCT 35.9*   MCV 95.3        BMP:   Recent Labs     12/08/22  1109 12/09/22  0533    141   K 4.1 3.9   CL 95* 103   CO2 36* 30   BUN 16 11   CREATININE 0.8 0.6   GLUCOSE 118* 99     POC GLUCOSE:  No results for input(s): POCGLU in the last 72 hours. LIVER PROFILE:   Recent Labs     12/08/22  1109   AST 12*   ALT <5*   LABALBU 4.0   BILITOT 0.3   ALKPHOS 95     PT/INR: No results for input(s): PROTIME, INR in the last 72 hours. APTT: No results for input(s): APTT in the last 72 hours. UA:  Recent Labs     12/08/22  1111   COLORU Yellow   PHUR 7.0   WBCUA 204*   RBCUA 6*   BACTERIA 4+*   CLARITYU CLOUDY*   SPECGRAV 1.013   LEUKOCYTESUR LARGE*   UROBILINOGEN 1.0   BILIRUBINUR Negative   BLOODU TRACE*   GLUCOSEU Negative   KETUA Negative     Microbiology:  Wound Culture:   Recent Labs     12/08/22  1111   ORG Gram negative marie*     Nasal Culture:   Recent Labs     12/08/22  1111   ORG Gram negative marie*     Blood Culture: No results for input(s): BC, BLOODCULT2 in the last 72 hours. Fungal Culture:   No results for input(s): FUNGSM in the last 72 hours. No results for input(s): FUNCXBLD in the last 72 hours. CSF Culture:  No results for input(s): COLORCSF, APPEARCSF, CFTUBE, CLOTCSF, WBCCSF, RBCCSF, NEUTCSF, NUMCELLSCSF, LYMPHSCSF, MONOCSF, GLUCCSF, VOLCSF in the last 72 hours. Respiratory Culture:  No results for input(s): Vertell Sharla in the last 72 hours. AFB:No results for input(s): AFBSMEAR in the last 72 hours. Urine Culture  Recent Labs     12/08/22  1111   LABURIN >100,000 CFU/ml  ID and sensitivity to follow         RADIOLOGY:    CT Head W/O Contrast   Final Result   No acute intracranial abnormality.       Mild-to-moderate senescent changes with parenchymal volume loss and chronic   small vessel ischemic changes. XR CHEST PORTABLE   Final Result   Increased bibasilar airspace disease, either atelectasis or pneumonia             CONSULTS:    IP CONSULT TO CASE MANAGEMENT  IP CONSULT TO SOCIAL WORK    ASSESSMENT AND PLAN:      Principal Problem:    AMS (altered mental status)  Resolved Problems:    * No resolved hospital problems. *    Patient is a 31-year-old female with a past medical history of dementia and Parkinson's disease, hypertension who presented from a skilled nursing facility with altered mental status. She was admitted with acute metabolic encephalopathy with possible UTI  . Acute metabolic encephalopathy- now at baseline  -Possibly due to a UTI. Await urine culture. Mentation improving    Possible UTI due to suprapubic catheter- c/s gram neg marie  -UA concerning for UTI but it could be colonized  -In the past patient had E. coli and Pseudomonas  -Catheter exchanged 2 weeks ago  -Urine culture pending    Parkinson's dementia  -Continue home meds  -Was started on clonazepam for possible hallucinations. Will restart    Hypertension  -Monitor blood pressure  -Resume home lisinopril  -Patient is on Lasix at home will hold for now        DVT Prophylaxis: Lovenox  Diet: ADULT DIET; Regular; Low Fat/Low Chol/High Fiber/2 gm Na  Code Status: DNR-CCA    PT/OT Eval Status:    Discharge plan -await c/s    The patient and / or the family were informed of the results of any tests, a time was given to answer questions, a plan was proposed and they agreed with plan. Discussed with consulting physicians, nursing and social work     The note was completed using EMR. Every effort was made to ensure accuracy; however, inadvertent computerized transcription errors may be present.        Humaira Lugo MD

## 2022-12-11 PROCEDURE — 6370000000 HC RX 637 (ALT 250 FOR IP): Performed by: NURSE PRACTITIONER

## 2022-12-11 PROCEDURE — 94760 N-INVAS EAR/PLS OXIMETRY 1: CPT

## 2022-12-11 PROCEDURE — 94640 AIRWAY INHALATION TREATMENT: CPT

## 2022-12-11 PROCEDURE — 2700000000 HC OXYGEN THERAPY PER DAY

## 2022-12-11 PROCEDURE — 1200000000 HC SEMI PRIVATE

## 2022-12-11 PROCEDURE — 6370000000 HC RX 637 (ALT 250 FOR IP): Performed by: INTERNAL MEDICINE

## 2022-12-11 PROCEDURE — 6360000002 HC RX W HCPCS: Performed by: INTERNAL MEDICINE

## 2022-12-11 PROCEDURE — 2580000003 HC RX 258: Performed by: INTERNAL MEDICINE

## 2022-12-11 RX ORDER — ALBUTEROL SULFATE 90 UG/1
2 AEROSOL, METERED RESPIRATORY (INHALATION) 4 TIMES DAILY PRN
Status: DISCONTINUED | OUTPATIENT
Start: 2022-12-11 | End: 2022-12-13 | Stop reason: HOSPADM

## 2022-12-11 RX ADMIN — CARBIDOPA AND LEVODOPA 2.5 TABLET: 25; 100 TABLET ORAL at 09:20

## 2022-12-11 RX ADMIN — CARBIDOPA AND LEVODOPA 2.5 TABLET: 25; 100 TABLET ORAL at 15:01

## 2022-12-11 RX ADMIN — CEFEPIME 2000 MG: 2 INJECTION, POWDER, FOR SOLUTION INTRAVENOUS at 14:29

## 2022-12-11 RX ADMIN — ENOXAPARIN SODIUM 40 MG: 100 INJECTION SUBCUTANEOUS at 21:31

## 2022-12-11 RX ADMIN — DOCUSATE SODIUM 100 MG: 100 CAPSULE, LIQUID FILLED ORAL at 09:20

## 2022-12-11 RX ADMIN — CLONAZEPAM 0.5 MG: 0.5 TABLET ORAL at 21:32

## 2022-12-11 RX ADMIN — ACETAMINOPHEN 650 MG: 325 TABLET, FILM COATED ORAL at 21:32

## 2022-12-11 RX ADMIN — CARBIDOPA AND LEVODOPA 2.5 TABLET: 25; 100 TABLET ORAL at 06:07

## 2022-12-11 RX ADMIN — LISINOPRIL 5 MG: 10 TABLET ORAL at 09:20

## 2022-12-11 RX ADMIN — CARBIDOPA AND LEVODOPA 3 TABLET: 25; 100 TABLET ORAL at 18:33

## 2022-12-11 RX ADMIN — SENNOSIDES 8.6 MG: 8.6 TABLET, FILM COATED ORAL at 09:20

## 2022-12-11 RX ADMIN — RIVASTIGMINE TARTRATE 1.5 MG: 1.5 CAPSULE ORAL at 09:20

## 2022-12-11 RX ADMIN — CARBIDOPA AND LEVODOPA 2.5 TABLET: 25; 100 TABLET ORAL at 12:33

## 2022-12-11 RX ADMIN — CARBIDOPA AND LEVODOPA 2 TABLET: 50; 200 TABLET, EXTENDED RELEASE ORAL at 21:33

## 2022-12-11 RX ADMIN — PRAVASTATIN SODIUM 20 MG: 20 TABLET ORAL at 21:32

## 2022-12-11 RX ADMIN — ALBUTEROL SULFATE 2 PUFF: 90 AEROSOL, METERED RESPIRATORY (INHALATION) at 09:36

## 2022-12-11 RX ADMIN — RIVASTIGMINE TARTRATE 1.5 MG: 1.5 CAPSULE ORAL at 21:33

## 2022-12-11 ASSESSMENT — PAIN SCALES - GENERAL
PAINLEVEL_OUTOF10: 4
PAINLEVEL_OUTOF10: 0
PAINLEVEL_OUTOF10: 0

## 2022-12-11 ASSESSMENT — PAIN - FUNCTIONAL ASSESSMENT: PAIN_FUNCTIONAL_ASSESSMENT: ACTIVITIES ARE NOT PREVENTED

## 2022-12-11 ASSESSMENT — PAIN DESCRIPTION - ORIENTATION: ORIENTATION: LOWER

## 2022-12-11 ASSESSMENT — PAIN DESCRIPTION - LOCATION: LOCATION: BACK

## 2022-12-11 ASSESSMENT — PAIN DESCRIPTION - DESCRIPTORS: DESCRIPTORS: DISCOMFORT;ACHING

## 2022-12-11 NOTE — PLAN OF CARE
Problem: Discharge Planning  Goal: Discharge to home or other facility with appropriate resources  Outcome: Progressing     Problem: Pain  Goal: Verbalizes/displays adequate comfort level or baseline comfort level  Outcome: Progressing  Flowsheets (Taken 12/10/2022 2056)  Verbalizes/displays adequate comfort level or baseline comfort level: Encourage patient to monitor pain and request assistance     Problem: Skin/Tissue Integrity  Goal: Absence of new skin breakdown  Description: 1. Monitor for areas of redness and/or skin breakdown  2. Assess vascular access sites hourly  3. Every 4-6 hours minimum:  Change oxygen saturation probe site  4. Every 4-6 hours:  If on nasal continuous positive airway pressure, respiratory therapy assess nares and determine need for appliance change or resting period.   Outcome: Progressing     Problem: Safety - Adult  Goal: Free from fall injury  Outcome: Progressing  Flowsheets (Taken 12/11/2022 0126)  Free From Fall Injury: Instruct family/caregiver on patient safety     Problem: ABCDS Injury Assessment  Goal: Absence of physical injury  Outcome: Progressing  Flowsheets (Taken 12/11/2022 0126)  Absence of Physical Injury: Implement safety measures based on patient assessment

## 2022-12-11 NOTE — PROGRESS NOTES
Patient alert and oriented X3. Patient tolerated nightly medications well in apple sauce. Patient requested tylenol for 5 out of 10 lower back pain. Followed PRN pain management protocol. See MAR. Patient verbalizes understanding of education. Patient vital signs recorded. Fall precautions in place. Bed in lowest position, side rails X2. Bed locks on. Bed alarm on. Non slip socks on. Needed items within reach. Call light within reach.  Electronically signed by Ines King RN on 12/10/2022 at 9:20 PM

## 2022-12-11 NOTE — PROGRESS NOTES
Hospitalist Progress Note      PCP: Eluterio Cushing    Date of Admission: 12/8/2022    Chief Complaint:   Chief Complaint   Patient presents with    Altered Mental Status     [place] [facility] [place], was only alert to painful stimuli, LKW 0600     Hospital Course:   Patient is a 79-year-old female with a past medical history of dementia and Parkinson's disease, hypertension who presented from a skilled nursing facility with altered mental status. She was admitted with acute metabolic encephalopathy with possible UTI    Subjective:     Patient feeling well today. Pseudomonas noted in urine cx. Continue IV cefepime today.        Medications:  Reviewed    Infusion Medications    sodium chloride       Scheduled Medications    carbidopa-levodopa  2 tablet Oral Nightly    carbidopa-levodopa  2.5 tablet Oral 4x daily    carbidopa-levodopa  3 tablet Oral QPM    clonazePAM  0.5 mg Oral QHS    docusate sodium  100 mg Oral Daily    senna  1 tablet Oral Daily    lisinopril  5 mg Oral Daily    pravastatin  20 mg Oral Nightly    rivastigmine  1.5 mg Oral BID    sodium chloride flush  5-40 mL IntraVENous 2 times per day    cefepime  2,000 mg IntraVENous Q24H    enoxaparin  40 mg SubCUTAneous Nightly     PRN Meds: diclofenac sodium, hydrOXYzine pamoate, simethicone, sodium chloride flush, sodium chloride, potassium chloride **OR** potassium alternative oral replacement **OR** potassium chloride, ondansetron **OR** ondansetron, polyethylene glycol, acetaminophen **OR** acetaminophen      Intake/Output Summary (Last 24 hours) at 12/11/2022 0830  Last data filed at 12/10/2022 1635  Gross per 24 hour   Intake --   Output 600 ml   Net -600 ml       Physical Exam Performed:    BP (!) 140/89   Pulse 69   Temp 98 °F (36.7 °C) (Oral)   Resp 18   Ht 4' 11\" (1.499 m)   Wt 176 lb 3.2 oz (79.9 kg)   SpO2 94%   BMI 35.59 kg/m²     General appearance: No apparent distress, appears stated age and cooperative. HEENT: Pupils equal, round, and reactive to light. Conjunctivae/corneas clear. Neck: Supple, with full range of motion. No jugular venous distention. Trachea midline. Respiratory:  Normal respiratory effort. Clear to auscultation, bilaterally without Rales/Wheezes/Rhonchi. Cardiovascular: Regular rate and rhythm with normal S1/S2 without murmurs, rubs or gallops. Abdomen: Soft, non-tender, non-distended with normal bowel sounds. Musculoskeletal: No clubbing, cyanosis or edema bilaterally. Full range of motion without deformity. Skin: Skin color, texture, turgor normal.  No rashes or lesions. Neurologic:  Neurovascularly intact without any focal sensory/motor deficits. Cranial nerves: II-XII intact, grossly non-focal.  Psychiatric: Alert and oriented, thought content appropriate, normal insight  Capillary Refill: Brisk,< 3 seconds   Peripheral Pulses: +2 palpable, equal bilaterally       Labs:   Recent Labs     12/08/22  1109   WBC 6.5   HGB 11.3*   HCT 35.9*        Recent Labs     12/08/22  1109 12/09/22  0533    141   K 4.1 3.9   CL 95* 103   CO2 36* 30   BUN 16 11   CREATININE 0.8 0.6   CALCIUM 8.9 8.5     Recent Labs     12/08/22  1109   AST 12*   ALT <5*   BILITOT 0.3   ALKPHOS 95     No results for input(s): INR in the last 72 hours. Recent Labs     12/08/22  1109   TROPONINI <0.01       Urinalysis:      Lab Results   Component Value Date/Time    NITRU POSITIVE 12/08/2022 11:11 AM    WBCUA 204 12/08/2022 11:11 AM    BACTERIA 4+ 12/08/2022 11:11 AM    RBCUA 6 12/08/2022 11:11 AM    BLOODU TRACE 12/08/2022 11:11 AM    SPECGRAV 1.013 12/08/2022 11:11 AM    GLUCOSEU Negative 12/08/2022 11:11 AM       Radiology:  CT Head W/O Contrast   Final Result   No acute intracranial abnormality. Mild-to-moderate senescent changes with parenchymal volume loss and chronic   small vessel ischemic changes.          XR CHEST PORTABLE   Final Result   Increased bibasilar airspace disease, either atelectasis or pneumonia                 Assessment/Plan:    Active Hospital Problems    Diagnosis     AMS (altered mental status) [R41.82]      Priority: Medium     Acute metabolic encephalopathy- now at baseline  -Possibly due to a UTI. Mentation improving     Possible UTI due to suprapubic catheter- c/s gram neg marie  -UA concerning for UTI but it could be colonized  -In the past patient had E. coli and Pseudomonas  -Catheter exchanged 2 weeks ago  -Urine culture with pseudomonas aeruginosa, sensitive to cefepime, IV cefepime continued for now day 4 today     Parkinson's dementia  -Continue home meds  -Was started on clonazepam for possible hallucinations. Will restart     Hypertension  -Monitor blood pressure  -Resume home lisinopril  -Patient is on Lasix at home will hold for now    DVT Prophylaxis: lovenox  Diet: ADULT DIET; Regular; Low Fat/Low Chol/High Fiber/2 gm Na  Code Status: DNR-CCA    PT/OT Eval Status: ongoing    Dispo - possible dc tomorrow    Rochelle Sanchez - NP

## 2022-12-12 ENCOUNTER — APPOINTMENT (OUTPATIENT)
Dept: CT IMAGING | Age: 87
DRG: 698 | End: 2022-12-12
Payer: COMMERCIAL

## 2022-12-12 LAB
ANION GAP SERPL CALCULATED.3IONS-SCNC: 9 MMOL/L (ref 3–16)
BASE EXCESS VENOUS: 6.6 MMOL/L
BASOPHILS ABSOLUTE: 0 K/UL (ref 0–0.2)
BASOPHILS RELATIVE PERCENT: 0.8 %
BUN BLDV-MCNC: 8 MG/DL (ref 7–20)
CALCIUM SERPL-MCNC: 9 MG/DL (ref 8.3–10.6)
CARBOXYHEMOGLOBIN: 1.3 %
CHLORIDE BLD-SCNC: 105 MMOL/L (ref 99–110)
CO2: 28 MMOL/L (ref 21–32)
CREAT SERPL-MCNC: <0.5 MG/DL (ref 0.6–1.2)
EOSINOPHILS ABSOLUTE: 0.3 K/UL (ref 0–0.6)
EOSINOPHILS RELATIVE PERCENT: 6.1 %
GFR SERPL CREATININE-BSD FRML MDRD: >60 ML/MIN/{1.73_M2}
GLUCOSE BLD-MCNC: 107 MG/DL (ref 70–99)
HCO3 VENOUS: 33 MMOL/L (ref 23–29)
HCT VFR BLD CALC: 33.6 % (ref 36–48)
HEMOGLOBIN: 10.7 G/DL (ref 12–16)
LACTIC ACID: 0.6 MMOL/L (ref 0.4–2)
LYMPHOCYTES ABSOLUTE: 1.7 K/UL (ref 1–5.1)
LYMPHOCYTES RELATIVE PERCENT: 31.8 %
MCH RBC QN AUTO: 30.2 PG (ref 26–34)
MCHC RBC AUTO-ENTMCNC: 31.7 G/DL (ref 31–36)
MCV RBC AUTO: 95.2 FL (ref 80–100)
METHEMOGLOBIN VENOUS: 0.7 %
MONOCYTES ABSOLUTE: 0.4 K/UL (ref 0–1.3)
MONOCYTES RELATIVE PERCENT: 7 %
NEUTROPHILS ABSOLUTE: 2.9 K/UL (ref 1.7–7.7)
NEUTROPHILS RELATIVE PERCENT: 54.3 %
O2 SAT, VEN: 96 %
O2 THERAPY: ABNORMAL
PCO2, VEN: 53 MMHG (ref 40–50)
PDW BLD-RTO: 15.5 % (ref 12.4–15.4)
PH VENOUS: 7.4 (ref 7.35–7.45)
PLATELET # BLD: 204 K/UL (ref 135–450)
PMV BLD AUTO: 7.7 FL (ref 5–10.5)
PO2, VEN: 76 MMHG
POTASSIUM REFLEX MAGNESIUM: 4.5 MMOL/L (ref 3.5–5.1)
RBC # BLD: 3.53 M/UL (ref 4–5.2)
SARS-COV-2, NAAT: NOT DETECTED
SODIUM BLD-SCNC: 142 MMOL/L (ref 136–145)
TCO2 CALC VENOUS: 34 MMOL/L
WBC # BLD: 5.3 K/UL (ref 4–11)

## 2022-12-12 PROCEDURE — 80048 BASIC METABOLIC PNL TOTAL CA: CPT

## 2022-12-12 PROCEDURE — 6370000000 HC RX 637 (ALT 250 FOR IP): Performed by: INTERNAL MEDICINE

## 2022-12-12 PROCEDURE — 85025 COMPLETE CBC W/AUTO DIFF WBC: CPT

## 2022-12-12 PROCEDURE — 2580000003 HC RX 258: Performed by: INTERNAL MEDICINE

## 2022-12-12 PROCEDURE — 87635 SARS-COV-2 COVID-19 AMP PRB: CPT

## 2022-12-12 PROCEDURE — 6370000000 HC RX 637 (ALT 250 FOR IP): Performed by: NURSE PRACTITIONER

## 2022-12-12 PROCEDURE — 2580000003 HC RX 258: Performed by: NURSE PRACTITIONER

## 2022-12-12 PROCEDURE — 70450 CT HEAD/BRAIN W/O DYE: CPT

## 2022-12-12 PROCEDURE — 82803 BLOOD GASES ANY COMBINATION: CPT

## 2022-12-12 PROCEDURE — 36415 COLL VENOUS BLD VENIPUNCTURE: CPT

## 2022-12-12 PROCEDURE — 94760 N-INVAS EAR/PLS OXIMETRY 1: CPT

## 2022-12-12 PROCEDURE — 2700000000 HC OXYGEN THERAPY PER DAY

## 2022-12-12 PROCEDURE — 83605 ASSAY OF LACTIC ACID: CPT

## 2022-12-12 PROCEDURE — 6360000002 HC RX W HCPCS: Performed by: NURSE PRACTITIONER

## 2022-12-12 PROCEDURE — 6360000002 HC RX W HCPCS: Performed by: INTERNAL MEDICINE

## 2022-12-12 PROCEDURE — 94640 AIRWAY INHALATION TREATMENT: CPT

## 2022-12-12 PROCEDURE — 1200000000 HC SEMI PRIVATE

## 2022-12-12 RX ORDER — BISACODYL 10 MG
10 SUPPOSITORY, RECTAL RECTAL DAILY PRN
Status: DISCONTINUED | OUTPATIENT
Start: 2022-12-12 | End: 2022-12-13 | Stop reason: HOSPADM

## 2022-12-12 RX ORDER — CARBIDOPA AND LEVODOPA 50; 200 MG/1; MG/1
1 TABLET, EXTENDED RELEASE ORAL NIGHTLY
Status: DISCONTINUED | OUTPATIENT
Start: 2022-12-12 | End: 2022-12-13 | Stop reason: HOSPADM

## 2022-12-12 RX ADMIN — CARBIDOPA AND LEVODOPA 3 TABLET: 25; 100 TABLET ORAL at 17:32

## 2022-12-12 RX ADMIN — CEFEPIME 2000 MG: 2 INJECTION, POWDER, FOR SOLUTION INTRAVENOUS at 14:31

## 2022-12-12 RX ADMIN — CARBIDOPA AND LEVODOPA 1 TABLET: 50; 200 TABLET, EXTENDED RELEASE ORAL at 22:08

## 2022-12-12 RX ADMIN — CARBIDOPA AND LEVODOPA 2.5 TABLET: 25; 100 TABLET ORAL at 14:26

## 2022-12-12 RX ADMIN — ALBUTEROL SULFATE 2 PUFF: 90 AEROSOL, METERED RESPIRATORY (INHALATION) at 10:49

## 2022-12-12 RX ADMIN — PRAVASTATIN SODIUM 20 MG: 20 TABLET ORAL at 22:08

## 2022-12-12 RX ADMIN — CARBIDOPA AND LEVODOPA 2.5 TABLET: 25; 100 TABLET ORAL at 12:35

## 2022-12-12 RX ADMIN — ACETAMINOPHEN 650 MG: 325 TABLET, FILM COATED ORAL at 22:08

## 2022-12-12 RX ADMIN — SODIUM CHLORIDE, PRESERVATIVE FREE 10 ML: 5 INJECTION INTRAVENOUS at 08:59

## 2022-12-12 RX ADMIN — RIVASTIGMINE TARTRATE 1.5 MG: 1.5 CAPSULE ORAL at 22:09

## 2022-12-12 RX ADMIN — ENOXAPARIN SODIUM 40 MG: 100 INJECTION SUBCUTANEOUS at 22:41

## 2022-12-12 ASSESSMENT — PAIN SCALES - GENERAL
PAINLEVEL_OUTOF10: 4
PAINLEVEL_OUTOF10: 0

## 2022-12-12 ASSESSMENT — PAIN DESCRIPTION - LOCATION: LOCATION: BACK

## 2022-12-12 NOTE — PLAN OF CARE
Problem: Skin/Tissue Integrity  Goal: Absence of new skin breakdown  Description: 1. Monitor for areas of redness and/or skin breakdown  2. Assess vascular access sites hourly  3. Every 4-6 hours minimum:  Change oxygen saturation probe site  4. Every 4-6 hours:  If on nasal continuous positive airway pressure, respiratory therapy assess nares and determine need for appliance change or resting period.   12/12/2022 1135 by Akbar Lee RN  Outcome: Progressing  12/12/2022 0330 by Isidro Morgan RN  Outcome: Progressing     Problem: Safety - Adult  Goal: Free from fall injury  12/12/2022 1135 by Akbar Lee RN  Outcome: Progressing  12/12/2022 0330 by Isidro Morgan RN  Outcome: Progressing

## 2022-12-12 NOTE — PROGRESS NOTES
Physical Therapy  Attempt Note  Dinora Torres  [id number]    The pt is out of the room at CT scan. Per nursing notes, the pt has been lethargic today and not responding as prior. Will await CT scan results and attempt again at a later time. If pt. is D/C'd prior to next visit please refer back to last daily progress note for D/C status.   Electronically signed by Israel Guadarrama, PT [id number] on 12/12/2022 at 1:42 PM

## 2022-12-12 NOTE — PROGRESS NOTES
Occupational Therapy  Attempt Note    Josefina Hahn  12/12/2022    OT attempted to see for therapy treatment session. Spoke with RN who reports pt is very lethargic and about to go for a stat head CT. Will defer therapy this date, and attempt again tomorrow as therapy schedule permits and as pt is able to participate. If pt is d/c'd prior to next therapy treatment session, please refer to last therapy progress note for pt's d/c status and OT recommendations.     Emory Sam, OTR/L 3111

## 2022-12-12 NOTE — PROGRESS NOTES
Page sent to MD d/t patient lethargy. Patients son Rebekah Ferris is bedside with concerns, and requesting a Head CT. Patient does respond to sternal rub and spontaneously at times will open her eyes. Not alert enough to take AM meds or answer questions. 10:30 am Supriya Pappas NP bedside evaluating patient. Patient did wake up and speak to NP. See note and See new orders.

## 2022-12-12 NOTE — CARE COORDINATION
Per chart review, patient is a probable discharge on 12/13/22. Plan is to return to [address] [place] [address]. Will require a rapid covid test. Test ordered.      Tatiana RAZON RN  Case Management  [phone]    Electronically signed by Tatiana Argueta RN on 12/12/2022 at 12:27 PM

## 2022-12-12 NOTE — PROGRESS NOTES
Hospitalist Progress Note      PCP: Traci Estrada    Date of Admission: 12/8/2022    Chief Complaint:   Chief Complaint   Patient presents with    Altered Mental Status     [place] [facility] [place] altered mental status this morning, was only alert to painful stimuli, LKW 0600     Hospital Course:   Patient is a 82-year-old female with a past medical history of dementia and Parkinson's disease, hypertension who presented from a skilled nursing facility with altered mental status. She was admitted with acute metabolic encephalopathy with possible UTI    Subjective:     Patient lethargic this morning during my visit. Her son is concerned that her klonopin dose is too high. While I was in the room with them evaluating her, she woke up and was very alert and answering all of my questions appropriately. Neuro exam was unremarkable. Speech was clear. Urine cx with both pseudomonas and e. Coli - sensitivity still pending for e. Coli. Labs are still pending.       Medications:  Reviewed    Infusion Medications    sodium chloride       Scheduled Medications    carbidopa-levodopa  2 tablet Oral Nightly    carbidopa-levodopa  2.5 tablet Oral 4x daily    carbidopa-levodopa  3 tablet Oral QPM    [Held by provider] clonazePAM  0.5 mg Oral QHS    docusate sodium  100 mg Oral Daily    senna  1 tablet Oral Daily    lisinopril  5 mg Oral Daily    pravastatin  20 mg Oral Nightly    rivastigmine  1.5 mg Oral BID    sodium chloride flush  5-40 mL IntraVENous 2 times per day    cefepime  2,000 mg IntraVENous Q24H    enoxaparin  40 mg SubCUTAneous Nightly     PRN Meds: albuterol sulfate HFA, diclofenac sodium, hydrOXYzine pamoate, simethicone, sodium chloride flush, sodium chloride, potassium chloride **OR** potassium alternative oral replacement **OR** potassium chloride, ondansetron **OR** ondansetron, polyethylene glycol, acetaminophen **OR** acetaminophen      Intake/Output Summary (Last 24 hours) at 12/12/2022 1124  Last data filed at 12/12/2022 6190  Gross per 24 hour   Intake 874.34 ml   Output 475 ml   Net 399.34 ml       Physical Exam Performed:    BP (!) 145/72   Pulse 61   Temp 98.2 °F (36.8 °C) (Oral)   Resp 18   Ht 4' 11\" (1.499 m)   Wt 178 lb 5.6 oz (80.9 kg)   SpO2 90%   BMI 36.02 kg/m²     General appearance: No apparent distress, appears stated age and cooperative. Alert and oriented. HEENT: Pupils equal, round, and reactive to light. Conjunctivae/corneas clear. Neck: Supple, with full range of motion. No jugular venous distention. Trachea midline. Respiratory:  Normal respiratory effort. Clear to auscultation, bilaterally without Rales/Wheezes/Rhonchi. Cardiovascular: Regular rate and rhythm with normal S1/S2 without murmurs, rubs or gallops. Abdomen: Soft, non-tender, non-distended with normal bowel sounds. Musculoskeletal: No clubbing, cyanosis or edema bilaterally. Full range of motion without deformity. Skin: Skin color, texture, turgor normal.  No rashes or lesions. Neurologic:  Neurovascularly intact without any focal sensory/motor deficits. Cranial nerves: II-XII intact, grossly non-focal.  Psychiatric: Alert and oriented, thought content appropriate, normal insight  Capillary Refill: Brisk,< 3 seconds   Peripheral Pulses: +2 palpable, equal bilaterally       Labs:   No results for input(s): WBC, HGB, HCT, PLT in the last 72 hours. No results for input(s): NA, K, CL, CO2, BUN, CREATININE, CALCIUM, PHOS in the last 72 hours. Invalid input(s): MAGNES    No results for input(s): AST, ALT, BILIDIR, BILITOT, ALKPHOS in the last 72 hours. No results for input(s): INR in the last 72 hours. No results for input(s): Hossein Pander in the last 72 hours.       Urinalysis:      Lab Results   Component Value Date/Time    NITRU POSITIVE 12/08/2022 11:11 AM    WBCUA 204 12/08/2022 11:11 AM    BACTERIA 4+ 12/08/2022 11:11 AM    RBCUA 6 12/08/2022 11:11 AM    BLOODU TRACE 12/08/2022 11:11 AM    SPECGRAV 1.013 12/08/2022 11:11 AM    GLUCOSEU Negative 12/08/2022 11:11 AM       Radiology:  CT Head W/O Contrast   Final Result   No acute intracranial abnormality. Mild-to-moderate senescent changes with parenchymal volume loss and chronic   small vessel ischemic changes. XR CHEST PORTABLE   Final Result   Increased bibasilar airspace disease, either atelectasis or pneumonia                 Assessment/Plan:    Active Hospital Problems    Diagnosis     AMS (altered mental status) [R41.82]      Priority: Medium     Acute metabolic encephalopathy- now at baseline  -Possibly due to a UTI. Mentation improving     Possible UTI due to suprapubic catheter  -UA concerning for UTI but it could be colonized  -In the past patient had E. coli and Pseudomonas  -Catheter exchanged 2 weeks ago  -Urine culture with pseudomonas aeruginosa, sensitive to cefepime, IV cefepime. Now also noted with e. Coli, sensitivity pending. Parkinson's dementia  -Continue home meds  -Was started on clonazepam for possible hallucinations. Will restart     Hypertension  -Monitor blood pressure  -Resume home lisinopril  -Patient is on Lasix at home will hold for now    DVT Prophylaxis: lovenox  Diet: ADULT DIET; Regular; Low Fat/Low Chol/High Fiber/2 gm Na  Code Status: DNR-CCA    PT/OT Eval Status: ongoing    Dispo - probable dc tomorrow    Rochelle Shannon - CARLOTA

## 2022-12-12 NOTE — PLAN OF CARE
Problem: Discharge Planning  Goal: Discharge to home or other facility with appropriate resources  12/12/2022 0330 by Erika Tobias RN  Outcome: Progressing     Problem: Pain  Goal: Verbalizes/displays adequate comfort level or baseline comfort level  12/12/2022 0330 by Erika Tobias RN  Outcome: Progressing     Problem: Skin/Tissue Integrity  Goal: Absence of new skin breakdown  Description: 1. Monitor for areas of redness and/or skin breakdown  2. Assess vascular access sites hourly  3. Every 4-6 hours minimum:  Change oxygen saturation probe site  4. Every 4-6 hours:  If on nasal continuous positive airway pressure, respiratory therapy assess nares and determine need for appliance change or resting period.   12/12/2022 0330 by Erika Tobias RN  Outcome: Progressing     Problem: Safety - Adult  Goal: Free from fall injury  12/12/2022 0330 by Erika Tobias RN  Outcome: Progressing     Problem: ABCDS Injury Assessment  Goal: Absence of physical injury  12/12/2022 0330 by Erika Tobias RN  Outcome: Progressing  Flowsheets (Taken 12/12/2022 0329)  Absence of Physical Injury: Implement safety measures based on patient assessment

## 2022-12-13 VITALS
HEIGHT: 59 IN | BODY MASS INDEX: 36.04 KG/M2 | TEMPERATURE: 97.7 F | OXYGEN SATURATION: 95 % | WEIGHT: 178.79 LBS | DIASTOLIC BLOOD PRESSURE: 80 MMHG | RESPIRATION RATE: 18 BRPM | SYSTOLIC BLOOD PRESSURE: 152 MMHG | HEART RATE: 71 BPM

## 2022-12-13 LAB
ANION GAP SERPL CALCULATED.3IONS-SCNC: 12 MMOL/L (ref 3–16)
BUN BLDV-MCNC: 9 MG/DL (ref 7–20)
CALCIUM SERPL-MCNC: 8.7 MG/DL (ref 8.3–10.6)
CHLORIDE BLD-SCNC: 104 MMOL/L (ref 99–110)
CO2: 27 MMOL/L (ref 21–32)
CREAT SERPL-MCNC: <0.5 MG/DL (ref 0.6–1.2)
GFR SERPL CREATININE-BSD FRML MDRD: >60 ML/MIN/{1.73_M2}
GLUCOSE BLD-MCNC: 120 MG/DL (ref 70–99)
ORGANISM: ABNORMAL
ORGANISM: ABNORMAL
POTASSIUM REFLEX MAGNESIUM: 4.1 MMOL/L (ref 3.5–5.1)
SODIUM BLD-SCNC: 143 MMOL/L (ref 136–145)
URINE CULTURE, ROUTINE: ABNORMAL
URINE CULTURE, ROUTINE: ABNORMAL

## 2022-12-13 PROCEDURE — 2700000000 HC OXYGEN THERAPY PER DAY

## 2022-12-13 PROCEDURE — 2580000003 HC RX 258: Performed by: INTERNAL MEDICINE

## 2022-12-13 PROCEDURE — 6370000000 HC RX 637 (ALT 250 FOR IP): Performed by: INTERNAL MEDICINE

## 2022-12-13 PROCEDURE — 92526 ORAL FUNCTION THERAPY: CPT

## 2022-12-13 PROCEDURE — 94760 N-INVAS EAR/PLS OXIMETRY 1: CPT

## 2022-12-13 PROCEDURE — 80048 BASIC METABOLIC PNL TOTAL CA: CPT

## 2022-12-13 PROCEDURE — 97129 THER IVNTJ 1ST 15 MIN: CPT

## 2022-12-13 PROCEDURE — 36415 COLL VENOUS BLD VENIPUNCTURE: CPT

## 2022-12-13 RX ORDER — CIPROFLOXACIN 500 MG/1
500 TABLET, FILM COATED ORAL 2 TIMES DAILY
Qty: 10 TABLET | Refills: 0 | Status: SHIPPED | OUTPATIENT
Start: 2022-12-13 | End: 2022-12-18

## 2022-12-13 RX ORDER — CARBIDOPA AND LEVODOPA 50; 200 MG/1; MG/1
1 TABLET, EXTENDED RELEASE ORAL NIGHTLY
Qty: 60 TABLET | Refills: 3 | Status: SHIPPED | OUTPATIENT
Start: 2022-12-13

## 2022-12-13 RX ADMIN — CARBIDOPA AND LEVODOPA 2.5 TABLET: 25; 100 TABLET ORAL at 11:39

## 2022-12-13 RX ADMIN — ACETAMINOPHEN 650 MG: 325 TABLET, FILM COATED ORAL at 04:46

## 2022-12-13 RX ADMIN — LISINOPRIL 5 MG: 10 TABLET ORAL at 08:28

## 2022-12-13 RX ADMIN — SODIUM CHLORIDE, PRESERVATIVE FREE 10 ML: 5 INJECTION INTRAVENOUS at 08:33

## 2022-12-13 RX ADMIN — DOCUSATE SODIUM 100 MG: 100 CAPSULE, LIQUID FILLED ORAL at 08:28

## 2022-12-13 RX ADMIN — SENNOSIDES 8.6 MG: 8.6 TABLET, FILM COATED ORAL at 08:28

## 2022-12-13 RX ADMIN — CARBIDOPA AND LEVODOPA 2.5 TABLET: 25; 100 TABLET ORAL at 05:52

## 2022-12-13 RX ADMIN — CARBIDOPA AND LEVODOPA 2.5 TABLET: 25; 100 TABLET ORAL at 08:28

## 2022-12-13 RX ADMIN — RIVASTIGMINE TARTRATE 1.5 MG: 1.5 CAPSULE ORAL at 08:28

## 2022-12-13 ASSESSMENT — PAIN DESCRIPTION - LOCATION: LOCATION: HEAD;EYE

## 2022-12-13 ASSESSMENT — PAIN SCALES - GENERAL: PAINLEVEL_OUTOF10: 5

## 2022-12-13 NOTE — CARE COORDINATION
CASE MANAGEMENT DISCHARGE SUMMARY: Discharge order noted. Patient returning to long-term care facility. Facility notified. Attempted to notify the daughter Marika Levine by telephone and to review the IMM letter. Unable to review with the patient due to dementia and disorientation. HIPAA compliant voicemail left requesting a return call.      DISCHARGE DATE: 12/13/22    DISCHARGED TO: 12/13/22    Discharging to Facility/ Agency   Name: [facility]  Address:  [address] [place] [address]   Phone:  [phone]  Fax:  [phone]                 REPORT NUMBER: [phone]               FAX NUMBER: [phone]    TRANSPORTATION: [facility]             TIME: 2:30 PM   Yes Form completed and on chart    INSURANCE PRECERT OBTAINED: N/A    HENS/PASAAR COMPLETED: N/A    Yes SHAQ Updated   Yes Case Management   Yes Physician   Yes Nurse    Yes  Destination updated (SNF/HHC)    Yes  Whiteboard Note Updated with above    Jodee CONTRERAS RN  Case Management  [phone]    Electronically signed by Jodee Denise RN on 12/13/2022 at 1:29 PM

## 2022-12-13 NOTE — PROGRESS NOTES
Patient is now ready for discharge, attempted to call report x2 to [facility] - no answer and no answering service for voicemail. Will attempt again. Handoff report given to transport and all questions answered. IV removed without complications. Daughter Brady Lundberg was called and notified of d/c - states she will inform the rest of the family. Will discharge when patient leaves the floor. Electronically signed by Leeroy Betts RN on 12/13/2022 at 2:04 PM    Attempt to call report a third time - no answer, unable to leave voicemail.

## 2022-12-13 NOTE — DISCHARGE INSTR - COC
Continuity of Care Form    Patient Name: Mushtaq Mccloud   :  1935  MRN:  [id number]    Admit date:  2022  Discharge date:  2022    Code Status Order: DNR-CCA   Advance Directives:     Admitting Physician:  Stephanie Raymond MD  PCP: Michele Yost    Discharging Nurse: [facility] Unit/Room#: [id number]/6114-66  Discharging Unit Phone Number: [phone]    Emergency Contact:   Extended Emergency Contact Information  Primary Emergency Contact: [facility] Phone: [phone]  Mobile Phone: [phone]  Relation: Child  Secondary Emergency Contact: [facility] Phone: [phone]  Mobile Phone: [phone]  Relation: Child   needed? Yes    Past Surgical History:  Past Surgical History:   Procedure Laterality Date    ABDOMEN SURGERY      bowel obstruction    APPENDECTOMY      CYSTOSCOPY N/A 2020    CYSTOSCOPY,  WITH INTRAVESICAL INJECTION OF BOTOX 200UNITS, WITH SUPRAPUBIC TUBE EXCHANGE performed by Elsa Marin MD at [address] [facility] ([facility])  1974    IVC FILTER INSERTION Right 2020    Dr. Wendy Garcia.  [place] retreivable IVC filter    TONSILLECTOMY AND ADENOIDECTOMY      UPPER GASTROINTESTINAL ENDOSCOPY N/A 2021    EGD BIOPSY performed by Freya Louise MD at [address] [facility]  2006    repair vagina bladder and rectum       Immunization History:   Immunization History   Administered Date(s) Administered    COVID-19, PFIZER Bivalent BOOSTER, (age 12y+), IM, 30 mcg/0.3 mL dose 2022    COVID-19, PFIZER GRAY top, DO NOT Dilute, (age 15 y+), IM, 30 mcg/0.3 mL 2022    COVID-19, PFIZER PURPLE top, DILUTE for use, (age 15 y+), 30mcg/0.3mL 2021, 2021, 10/27/2021       Active Problems:  Patient Active Problem List   Diagnosis Code    Pulmonary embolism without acute cor pulmonale (HCC) I26.99    Small bowel obstruction ([address] K56.609    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    Nausea and vomiting R11.2    History of pulmonary embolism Z86.711    SOB (shortness of breath) R06.02    STAR (obstructive sleep apnea) G47.33    Chronic hypoxemic respiratory failure ([facility]) J96.11    Restrictive lung disease J98.4    AMS (altered mental status) R41.82       Isolation/Infection:   Isolation            No Isolation          Patient Infection Status       Infection Onset Added Last Indicated Last Indicated By Review Planned Expiration Resolved Resolved By    None active    Resolved    COVID-19 (Rule Out) 12/08/22 12/08/22 12/08/22 COVID-19, Rapid (Ordered)   12/08/22 Rule-Out Test Resulted    COVID-19 (Rule Out) 03/05/21 03/05/21 03/05/21 COVID-19, Rapid (Ordered)   03/05/21 Rule-Out Test Resulted    COVID-19 (Rule Out) 02/26/21 02/26/21 02/26/21 COVID-19, Rapid (Ordered)   02/26/21 Rule-Out Test Resulted    COVID-19 (Rule Out) 12/17/20 12/17/20 12/17/20 COVID-19 (Ordered)   12/17/20 Rule-Out Test Resulted    COVID-19 (Rule Out) 12/13/20 12/13/20 12/13/20 COVID-19 (Ordered)   12/13/20 Rule-Out Test Resulted            Nurse Assessment:  Last Vital Signs: BP (!) 152/80   Pulse 71   Temp 97.7 °F (36.5 °C) (Axillary)   Resp 18   Ht 4' 11\" (1.499 m)   Wt 178 lb 12.7 oz (81.1 kg)   SpO2 95%   BMI 36.11 kg/m²     Last documented pain score (0-10 scale): Pain Level: 5  Last Weight:   Wt Readings from Last 1 Encounters:   12/13/22 178 lb 12.7 oz (81.1 kg)     Mental Status:  oriented and alert    IV Access:  - None    Nursing Mobility/ADLs:  Walking   Assisted  Transfer  Assisted  Bathing  Assisted  Dressing  Assisted  Toileting  Assisted  Feeding  Independent  Med Admin  Assisted  Med Delivery   whole in applesauce     Wound Care Documentation and Therapy:  Wound 12/08/22 Buttocks Right multiple (3) stage 2 (Active)   Dressing Status Other (Comment) 12/13/22 5856   Wound Cleansed Soap and water 12/11/22 2952   Dressing/Treatment Moisture barrier 12/13/22 0956   Wound Assessment Pink/red 12/13/22 0956   Drainage Amount None 12/13/22 0956   Balbina-wound Assessment Blanchable erythema 12/13/22 0956   Number of days: 4        Elimination:  Continence: Bowel: Yes  Bladder: Yes  Urinary Catheter: None   Colostomy/Ileostomy/Ileal Conduit: No       Date of Last BM: 12/13/2022    Intake/Output Summary (Last 24 hours) at 12/13/2022 1016  Last data filed at 12/13/2022 0926  Gross per 24 hour   Intake 550 ml   Output 1050 ml   Net -500 ml     I/O last 3 completed shifts: In: 204.3 [P.O.:50; IV Piggyback:154.3]  Out: 1050 [Urine:400; Emesis/NG output:650]    Safety Concerns: At Risk for Falls, Aspiration risk     Impairments/Disabilities:      None    Nutrition Therapy:  Current Nutrition Therapy:   - Oral Diet:  General/low fat/high fiber/ 2gm Na     Routes of Feeding: Oral  Liquids: No Restrictions  Daily Fluid Restriction: no  Last Modified Barium Swallow with Video (Video Swallowing Test): not done    Treatments at the Time of Hospital Discharge:   Respiratory Treatments: 2L O2 dependent   Oxygen Therapy:  is on oxygen at 2 L/min per nasal cannula. Ventilator:    - No ventilator support    Rehab Therapies: Physical Therapy and Occupational Therapy  Weight Bearing Status/Restrictions: No weight bearing restrictions  Other Medical Equipment (for information only, NOT a DME order):   Walker   Other Treatments: none    Patient's personal belongings (please select all that are sent with patient):  None    RN SIGNATURE:  Electronically signed by Loretta Stafford RN on 12/13/22 at 12:50 PM EST    CASE MANAGEMENT/SOCIAL WORK SECTION    Inpatient Status Date: 12/08/2022    Readmission Risk Assessment Score:  Readmission Risk              Risk of Unplanned Readmission:  14           Discharging to Facility/ Agency   Name: [facility]  Address:  [address] [place] [address], [address]   Phone:  [phone]  Fax:  [phone] / signature: Electronically signed by Lenny Mitchell RN on 12/13/22 at 1:23 PM EST    PHYSICIAN SECTION    Prognosis: Good    Condition at Discharge: Stable    Rehab Potential (if transferring to Rehab): Good    Recommended Labs or Other Treatments After Discharge: Follow up with PCP in 1 week for hospital follow up    Follow up with your neurologist as soon as able for adjusting of medications    Physician Certification: I certify the above information and transfer of Kahlil Lay  is necessary for the continuing treatment of the diagnosis listed and that she requires LTC for greater 30 days. Update Admission H&P: No change in H&P    PHYSICIAN SIGNATURE:  Electronically signed by Lauren Farrell NP on 12/13/22 at 10:17 AM EST / Dr. Jonathan Pal

## 2022-12-13 NOTE — CARE COORDINATION
12/13/22 1418   IMM Letter   IMM Letter given to Patient/Family/Significant other/Guardian/POA/by: Message left for the daughter Brennan Shaw at 1:20PM. Reviewed with the daughter Mary Castrejon by telephone ([phone]). Verbalized understanding, denies questions. Aware of the 4 hours for the Medicare Appeal process. Did not provide email address for copy to be sent and no address for the daughter provided. Voicemail message left requesting either an email address or physical address in which to send copy. Gennaro Amador RN CM   IMM Letter date given: 12/13/22   IMM Letter time given: 1000 Andrea Connors RN  Case Management  [phone]    Electronically signed by Gennaro Amador RN on 12/13/2022 at 2:35 PM    Addendum:     Received call back from the daughter, Brennan Shaw. Email address provided and copy emailed to [email or web address] by Gennaro Amador RN, CM.      Gennaro CONTRERAS RN  Case Management  [phone]    Electronically signed by Gennaro Amador RN on 12/13/2022 at 3:49 PM

## 2022-12-13 NOTE — DISCHARGE SUMMARY
Hospital Medicine Discharge Summary    Patient ID: Omar Boyce      Patient's PCP: [facility]    Admit Date: 12/8/2022     Discharge Date:   12/13/22    Admitting Physician: Eliz Colon MD     Discharge Physician: Chan Vail. Cayetano Sanchez - NP     Discharge Diagnoses: Active Hospital Problems    Diagnosis     AMS (altered mental status) [R41.82]      Priority: Medium       The patient was seen and examined on day of discharge and this discharge summary is in conjunction with any daily progress note from day of discharge. Hospital Course:   Patient is a 51-year-old female with a past medical history of dementia and Parkinson's disease, hypertension who presented from a skilled nursing facility with altered mental status. She was admitted with acute metabolic encephalopathy with possible UTI    Patient doing well today. She is alert and oriented x4 and no longer lethargic. She is medically stable for discharge. I discussed this with the patient and her son at bedside. Acute metabolic encephalopathy- now at baseline  -Possibly due to a UTI. Mentation improving  -CT head nonacute     Possible UTI due to suprapubic catheter  -UA concerning for UTI but it could be colonized  -In the past patient had E. coli and Pseudomonas  -Catheter exchanged 2 weeks ago  -Urine culture with pseudomonas aeruginosa and e. coli, sensitive to cefepime, IV cefepime continued.  Will change to oral cipro on discharge per sensitivities     Parkinson's dementia  -Continue home meds - will decrease sinemet CR to 1 tablet nightly after discussion with her  neurologist  -Was started on clonazepam for possible hallucinations - will discontinue for now, she can evaluate need for this with her neurologist outpatient     Hypertension  -Monitor blood pressure  -Resume home lisinopril and lasix      Physical Exam Performed:     BP (!) 152/80   Pulse 71   Temp 97.7 °F (36.5 °C) (Axillary)   Resp 18   Ht 4' 11\" (1.499 m) Wt 178 lb 12.7 oz (81.1 kg)   SpO2 95%   BMI 36.11 kg/m²       General appearance:  No apparent distress, appears stated age and cooperative. HEENT:  Normal cephalic, atraumatic without obvious deformity. Pupils equal, round, and reactive to light. Extra ocular muscles intact. Conjunctivae/corneas clear. Neck: Supple, with full range of motion. No jugular venous distention. Trachea midline. Respiratory:  Normal respiratory effort. Clear to auscultation, bilaterally without Rales/Wheezes/Rhonchi. Cardiovascular:  Regular rate and rhythm with normal S1/S2 without murmurs, rubs or gallops. Abdomen: Soft, non-tender, non-distended with normal bowel sounds. Musculoskeletal:  No clubbing, cyanosis or edema bilaterally. Full range of motion without deformity. Skin: Skin color, texture, turgor normal.  No rashes or lesions. Neurologic:  Neurovascularly intact without any focal sensory/motor deficits. Cranial nerves: II-XII intact, grossly non-focal.  Psychiatric:  Alert and oriented, thought content appropriate, normal insight  Capillary Refill: Brisk,< 3 seconds   Peripheral Pulses: +2 palpable, equal bilaterally       Labs: For convenience and continuity at follow-up the following most recent labs are provided:      CBC:    Lab Results   Component Value Date/Time    WBC 5.3 12/12/2022 11:41 AM    HGB 10.7 12/12/2022 11:41 AM    HCT 33.6 12/12/2022 11:41 AM     12/12/2022 11:41 AM       Renal:    Lab Results   Component Value Date/Time     12/13/2022 06:43 AM    K 4.1 12/13/2022 06:43 AM     12/13/2022 06:43 AM    CO2 27 12/13/2022 06:43 AM    BUN 9 12/13/2022 06:43 AM    CREATININE <0.5 12/13/2022 06:43 AM    CALCIUM 8.7 12/13/2022 06:43 AM         Significant Diagnostic Studies    Radiology:   CT HEAD WO CONTRAST   Final Result   No hemorrhage or mass identified      Atrophy and small-vessel ischemic change, similar to prior.       Mild paranasal sinus disease         CT Head W/O Contrast rivastigmine (EXELON) 1.5 MG capsule Take 1.5 mg by mouth 2 times daily      mirtazapine (REMERON) 15 MG tablet Take 15 mg by mouth nightly      dibucaine (NUPERCAINAL) 1 % ointment Apply topically 3 times daily as needed (Hemorrhoids) Apply dime size amount to external hemorrhoids      carbidopa-levodopa (SINEMET CR)  MG per extended release tablet Take 2 tablets by mouth nightly      Multiple Vitamins-Minerals (THERAPEUTIC MULTIVITAMIN-MINERALS) tablet Take 1 tablet by mouth daily      carbidopa-levodopa (SINEMET)  MG per tablet Take 2.5-3 tablets by mouth 5 times daily 2.5 tablets at 0600, 0900, 1200, 1500 and 3 tablets at 1800      loteprednol (LOTEMAX) 0.5 % ophthalmic suspension Place 1 drop into both eyes 2 times daily as needed (Eye irritation)       lisinopril (PRINIVIL;ZESTRIL) 5 MG tablet Take 5 mg by mouth daily      pravastatin (PRAVACHOL) 20 MG tablet Take 20 mg by mouth nightly       sertraline (ZOLOFT) 100 MG tablet Take 100 mg by mouth daily      CRANBERRY PO Take 800 mg by mouth daily       Probiotic Product (PROBIOTIC-10 PO) Take 1 tablet by mouth daily      vitamin D (CHOLECALCIFEROL) 25 MCG (1000 UT) TABS tablet Take 1,000 Units by mouth daily      Ascorbic Acid (VITAMIN C) 1000 MG tablet Take 1,000 mg by mouth Daily with lunch       polyethylene glycol (GLYCOLAX) 17 g packet Take 17 g by mouth daily as needed for Constipation       acetaminophen (TYLENOL) 500 MG tablet Take 650 mg by mouth 3 times daily as needed for Pain      carboxymethylcellulose (REFRESH PLUS) 0.5 % SOLN ophthalmic solution Place 1 drop into both eyes 4 times daily as needed for Dry Eyes              Time Spent on discharge is more than 1 hour in the examination, evaluation, counseling and review of medications and discharge plan. Signed:    Prashanth Farrell NP   12/13/2022      Thank you Shelbi Brunner for the opportunity to be involved in this patient's care.  If you have any questions or concerns please feel free to contact me at [phone].

## 2022-12-13 NOTE — PROGRESS NOTES
Pharmacy Note   Received telephone call from [facility] neurologist office, Dr Gerard Narvaez nurse. Physician is okay with reduction of Sinemet CR  mg to one tab HS going forward.      Dm Reyes, [facility]

## 2022-12-13 NOTE — PROGRESS NOTES
[facility]   Speech Therapy  Daily Dysphagia Treatment Note        Nanette Mcguire  AGE: 82 y.o. GENDER: female  : 1935  [id number]  EPISODE DATE:  2022  Patient Active Problem List   Diagnosis    Pulmonary embolism without acute cor pulmonale (HCC)    Small bowel obstruction (HCC)    Femoral vein thrombosis (HCC)    Nausea and vomiting    History of pulmonary embolism    SOB (shortness of breath)    STAR (obstructive sleep apnea)    Chronic hypoxemic respiratory failure ([address]    Restrictive lung disease    AMS (altered mental status)     Allergies   Allergen Reactions    Caduet [Amlodipine-Atorvastatin]     Estrogens Conjugated Other (See Comments)     lightheaded    Penicillins Rash    Terramycin [Oxytetracycline-Lidocaine] Rash     Treatment Diagnosis: Dysphagia       Chart review:   H&P:   Chief Complaint: Altered mental status  History obtained from patient, patient's daughter, medical records and the ER physician. Nanette Mcguire is a 82 y.o. female with PMH as below presented to the ER with chief complaints of confusion this morning. According to the patient's daughter she received a call from the nursing home that the patient was noted to be too drowsy today. According to the patient she did not have a good dinner last night and this morning when she went to the restroom she was unable to get up and they brought her to the ER. According to the daughter she talked to the patient yesterday and she was talking fine. While he was in the ER the patient received IV fluids and has already started to improve and according to the daughter she was coming back to her baseline. Acute encephalopathy unknown etiology. Concern for UTI but the patient has suprapubic catheter and can have colonization. We will give cefepime 2 cultures are back. In the past she has grown E. coli and Pseudomonas. Suprapubic catheter was exchanged 2 weeks ago.   Symptom has already improved with IV fluids  Dementia Parkinson's. Resume home medications Sinemet, sertraline and rivastigmine  Generalized weakness. Consult PT and OT  Hypertension. Currently holding blood pressure medications. Resume in a.m. Imaging:  Chest X-ray: 12/8/2022  Impression   Increased bibasilar airspace disease, either atelectasis or pneumonia          Head CT: 12/8/2022  Impression   No acute intracranial abnormality. Mild-to-moderate senescent changes with parenchymal volume loss and chronic   small vessel ischemic changes. Head CT: 12/12/2022  Impression   No hemorrhage or mass identified       Atrophy and small-vessel ischemic change, similar to prior. Mild paranasal sinus disease           Subjective:     Current diet: regular/thin  Comments regarding tolerating Current Diet: pt reports increased coughing this AM with breakfast. Pt with poor positioning/reclined position upon SLP arrival and pt states this was position she ate breakfast in.    Objective:     Pain: denies  Cognitive/Behavior; alert, oriented, agreeable to tx  Positioning; pt very reclined upon SLP arrival. Pt repositioned to upright at approx 80 degrees. PO Trials:   Thin Liquids; via straw and cup; consecutive sips via straw and cup resulted in overt coughing, single sips via cup appeared to be tolerated with no overt s/s aspiration, suspected premature loss to pharynx, suspected delayed initiation, suspected reduced laryngeal elevation, suspected airway violation penetration vs aspiration with rapid ingestion, no s/s aspiration with single sips via cup  Nectar thick liquids; DNT  Honey Thick liquids; DNT  Puree; DNT  Soft food; DNT  Regular food; mildly prolonged mastication, mild lingual residue with prolonged lingual manipulation for clearance, pt was able to fully clear oral cavity, suspect delayed initiation, suspect reduced laryngeal elevation, no s/s aspiration    Dysphagia Tx:   Direct Dysphagia tx; PO trials tolerated as described above  Dysphagia ex; effortful swallow exercises mod cues required  Training in compensatory strategies; education regarding positioning, rate, bite/sip size, no straws, therapeutic exercises, rationale for tx  Pt response to ex/training: pt expressed understanding, ongoing education indicated    Goals:   Pt will functionally tolerate recommended diet with no overt clinical s/s of aspiration ongoing continue  Pt will demonstrate understanding of aspiration risk and precautions via education/demonstration with occasional prompting ongoing continue       Assessment:   Impressions:   OROPHARYNGEAL DYSPHAGIA DIAGNOSIS:   Pt pleasant, cooperative and accepted tx. Pt upright in bed, son at bedside. Mild braydon-pharyngeal dysphagia characterized by mild lingual weakness, mild lingual residue with solids, suspected premature spillage to the pharynx, suspected delayed initiation, suspected reduced laryngeal elevation, overt coughing noted with consecutive sips of thin via straw and cup. With education and single sips via cup NO s/s aspiration with PO trials. Positioning was not optimal with pt in bed, upright at approx 80 degrees  Recommend continue current diet of regular/thin at this time with monitoring for any s/s aspiration or changes in respiratory status. Pt should be fully upright, no straws recommended at this time. Ongoing education completed with pt and son regarding rationale for tx, diet recommendations, compensatory strategies. SLP to follow 1-2x to ensure diet texture/tolerance. 2.  MEDICAL OR COGNITIVE/BEHAVIORAL FACTORS WHICH CAN EXACERBATE:   Baseline dx of Parkinson's Disease  Silent aspiration cannot be ruled out during a bedside assessment. If concerns for aspiration or changes in respiratory status, MBS may be indicated. Diet Recommendations:  Solid consistency: Regular  Liquid consistency:  Thin  Liquid administration via: cup only, no straws  Medication administration: Meds in puree  Compensatory Swallowing Strategies: Upright as possible for all oral intake, slow rate, small bites/sips  Education:  Provided education regarding role of SLP, results of assessment, recommendations and general speech pathology plan of care. [x] Pt verbalized understanding and agreement   [x] Pt requires ongoing learning   [] No evidence of comprehension   Discussed recommendations with son at bedside as well. Prognosis: fair  Barriers: medical dx Parkinson's    Plan:     Continue Dysphagia Therapy: YES  Interventions:  dysphagia tx, education  Duration/Frequency of therapy while on unit:  1-2x/week  Discharge Instructions:   Anticipate Yes__x__No__ for further skilled Speech Therapy for Dysphagia at discharge    This note serves as a D/C Summary in the event that this patient is discharged prior to the next therapy session.     Coded treatment time: 10  Total treatment time:  25    Time in: 9367  Time out: 0462 [facility]    Electronically signed by   Margret Cuellar  [facility]  Speech-Language Pathologist  12/13/22  11:17am

## 2022-12-13 NOTE — PLAN OF CARE
Problem: Discharge Planning  Goal: Discharge to home or other facility with appropriate resources  12/13/2022 0019 by Erlinda Chirinos RN  Outcome: Progressing  Flowsheets (Taken 12/12/2022 2208)  Discharge to home or other facility with appropriate resources: Identify barriers to discharge with patient and caregiver     Problem: Pain  Goal: Verbalizes/displays adequate comfort level or baseline comfort level  12/13/2022 0019 by Erlinda Chirinos RN  Outcome: Progressing     Problem: Skin/Tissue Integrity  Goal: Absence of new skin breakdown  Description: 1. Monitor for areas of redness and/or skin breakdown  2. Assess vascular access sites hourly  3. Every 4-6 hours minimum:  Change oxygen saturation probe site  4. Every 4-6 hours:  If on nasal continuous positive airway pressure, respiratory therapy assess nares and determine need for appliance change or resting period.   12/13/2022 0019 by Erlinda Chirinos RN  Outcome: Progressing     Problem: Safety - Adult  Goal: Free from fall injury  12/13/2022 0019 by Erlinda Chirinos RN  Outcome: Progressing     Problem: ABCDS Injury Assessment  Goal: Absence of physical injury  12/13/2022 0019 by Erlinda Chirinos RN  Outcome: Progressing

## 2022-12-13 NOTE — PLAN OF CARE
Problem: Discharge Planning  Goal: Discharge to home or other facility with appropriate resources  12/13/2022 1001 by Yahir Hunter RN  Outcome: Progressing     Problem: Pain  Goal: Verbalizes/displays adequate comfort level or baseline comfort level  12/13/2022 1001 by Yahir Hunter RN  Outcome: Progressing     Problem: Skin/Tissue Integrity  Goal: Absence of new skin breakdown  Description: 1. Monitor for areas of redness and/or skin breakdown  2. Assess vascular access sites hourly  3. Every 4-6 hours minimum:  Change oxygen saturation probe site  4. Every 4-6 hours:  If on nasal continuous positive airway pressure, respiratory therapy assess nares and determine need for appliance change or resting period.   12/13/2022 1001 by Yahir Hunter RN  Outcome: Progressing     Problem: Safety - Adult  Goal: Free from fall injury  12/13/2022 1001 by Yahir Hunter RN  Outcome: Progressing     Problem: ABCDS Injury Assessment  Goal: Absence of physical injury  12/13/2022 1001 by Yahir Hunter RN  Outcome: Progressing

## 2023-02-17 ENCOUNTER — OFFICE VISIT (OUTPATIENT)
Dept: PULMONOLOGY | Age: 88
End: 2023-02-17
Payer: COMMERCIAL

## 2023-02-17 VITALS
RESPIRATION RATE: 21 BRPM | SYSTOLIC BLOOD PRESSURE: 110 MMHG | HEIGHT: 59 IN | OXYGEN SATURATION: 94 % | HEART RATE: 72 BPM | WEIGHT: 163.4 LBS | BODY MASS INDEX: 32.94 KG/M2 | TEMPERATURE: 97.1 F | DIASTOLIC BLOOD PRESSURE: 60 MMHG

## 2023-02-17 DIAGNOSIS — I26.99 ACUTE PULMONARY EMBOLISM WITHOUT ACUTE COR PULMONALE, UNSPECIFIED PULMONARY EMBOLISM TYPE (HCC): ICD-10-CM

## 2023-02-17 DIAGNOSIS — J96.11 CHRONIC HYPOXEMIC RESPIRATORY FAILURE (HCC): ICD-10-CM

## 2023-02-17 DIAGNOSIS — G47.33 OSA (OBSTRUCTIVE SLEEP APNEA): Primary | ICD-10-CM

## 2023-02-17 DIAGNOSIS — J98.4 RESTRICTIVE LUNG DISEASE: ICD-10-CM

## 2023-02-17 PROCEDURE — G8427 DOCREV CUR MEDS BY ELIG CLIN: HCPCS | Performed by: INTERNAL MEDICINE

## 2023-02-17 PROCEDURE — G8400 PT W/DXA NO RESULTS DOC: HCPCS | Performed by: INTERNAL MEDICINE

## 2023-02-17 PROCEDURE — 1036F TOBACCO NON-USER: CPT | Performed by: INTERNAL MEDICINE

## 2023-02-17 PROCEDURE — G8484 FLU IMMUNIZE NO ADMIN: HCPCS | Performed by: INTERNAL MEDICINE

## 2023-02-17 PROCEDURE — 99214 OFFICE O/P EST MOD 30 MIN: CPT | Performed by: INTERNAL MEDICINE

## 2023-02-17 PROCEDURE — 1123F ACP DISCUSS/DSCN MKR DOCD: CPT | Performed by: INTERNAL MEDICINE

## 2023-02-17 PROCEDURE — G8417 CALC BMI ABV UP PARAM F/U: HCPCS | Performed by: INTERNAL MEDICINE

## 2023-02-17 PROCEDURE — 1090F PRES/ABSN URINE INCON ASSESS: CPT | Performed by: INTERNAL MEDICINE

## 2023-02-17 ASSESSMENT — ENCOUNTER SYMPTOMS: RESPIRATORY NEGATIVE: 1

## 2023-02-17 NOTE — ASSESSMENT & PLAN NOTE
Rmsuzette Gregg continues to use and benefit from supplemental oxygen.  -She does have a history of undertreated pulmonary embolism due to GI bleed

## 2023-02-17 NOTE — PROGRESS NOTES
[address] [place], SLEEP, AND CRITICAL CARE    Kenia Arteaga (:  1935) is a 82 y.o. female,Established patient, here for evaluation of the following chief complaint(s):  Follow-up         ASSESSMENT/PLAN:  1. STAR (obstructive sleep apnea)  Assessment & Plan:   - Is now agreeable to considering CPAP, okay to do split-night study  Orders:  -     Sleep Study with PAP Titration; Future  2. Restrictive lung disease  Assessment & Plan:   - Suspect extraparenchymal, HRCT largely unrevealing. Likely some degree of kyphosis and truncal obesity  3. Chronic hypoxemic respiratory failure [facility])  Assessment & Plan:  Kenia Arteaga continues to use and benefit from supplemental oxygen.  -She does have a history of undertreated pulmonary embolism due to GI bleed  4. Acute pulmonary embolism without acute cor pulmonale, unspecified pulmonary embolism type ([address]    Return in about 4 months (around 2023). No future appointments. Subjective   SUBJECTIVE/OBJECTIVE:  Restrictive PFTs but HRCT without ILD possible extraparenchymal restriction    Remains on oxygen    Is agreeable to sleep study now and will consider CPAP      Review of Systems   Constitutional: Negative. Respiratory: Negative. Cardiovascular: Negative. Neurological: Negative. Psychiatric/Behavioral: Negative. Objective   Physical Exam     Gen:  No acute distress. Eyes: PERRL. EOMI. Anicteric sclera. No conjunctival injection. ENT: No discharge. oropharynx clear. External appearance of ears and nose normal.  Neck: Trachea midline. No mass   Resp:  No crackles. No wheezes. No rhonchi. No dullness on percussion. CV: Regular rate. Regular rhythm. No murmur or rub. No edema. GI: Soft, Non-tender. Non-distended. +BS  Skin: Warm, dry, w/o erythema. Lymph: No cervical or supraclavicular LAD. M/S: No cyanosis. No clubbing. Neuro:  no focal neurologic deficit.   Moves all extremities  Psych: Awake and alert, Oriented x 3. Judgement and insight appropriate. Mood stable. I spent 31 minutes with the patient, >50% was face-to-face in discussion of the diagnosis and the coordination of care in regards to the treatment plan. All questions answered. An electronic signature was used to authenticate this note.     --Anuradha Kumar MD

## 2023-02-17 NOTE — ASSESSMENT & PLAN NOTE
- Suspect extraparenchymal, HRCT largely unrevealing.   Likely some degree of kyphosis and truncal obesity

## 2023-03-07 ENCOUNTER — TELEPHONE (OUTPATIENT)
Dept: SLEEP CENTER | Age: 88
End: 2023-03-07

## 2023-03-07 NOTE — TELEPHONE ENCOUNTER
After speaking with pt about scheduling her sleep study -she mentioned being told she needed to schedule at [facility]. I did check with  and he stated she could schedule at whichever lab she needed to. [facility] or [facility] would be fine. Called pt back, leaving vm stating this with both labs numbers so she can schedule wherever she prefers to go.

## 2023-04-03 ENCOUNTER — TELEPHONE (OUTPATIENT)
Dept: PULMONOLOGY | Age: 88
End: 2023-04-03

## 2023-05-07 NOTE — TELEPHONE ENCOUNTER
----- Message from [address] [place] [address] sent at 4/3/2023 12:10 PM EDT -----  Regarding: FW: Denial  Please advise    ----- Message -----  From: Cassy Ibrahim  Sent: 4/3/2023  11:17 AM EDT  To: Doyle Starr MD, [facility], #  Subject: Denial                                           Good morning    Received Case status update:  Denied/Not Covered/Not Approved  - received Denial info this morning from [facility] via email/fax. If the ordering provider would like to discuss this case with the [facility] Physician reviewer please call  - [phone]. Notified facility via in-basket (Audrey Ward MD/Priyank Carver) - but, I do not have the 'pool' info for Dr. Judah Francis; so am not sure who to send this to.     Electronically signed by Cassy Ibrahim on 4/3/2023 at 11:14 AM
LMOM to call. Spoke with [facility] to cancel appt.      Patient needs follow up mid Carly
Patient calls back and made an appt for mid June.
Split night study is denied.   Will fwd to dr Margarita Reyna to see if he wants to call for peer to peer at number below
Yes

## 2023-06-26 ENCOUNTER — TELEPHONE (OUTPATIENT)
Dept: PULMONOLOGY | Age: 88
End: 2023-06-26

## 2023-06-29 RX ORDER — FLUOROMETHOLONE ACETATE 1 MG/ML
1 SUSPENSION/ DROPS OPHTHALMIC 2 TIMES DAILY PRN
COMMUNITY

## 2023-06-29 RX ORDER — SIMETHICONE 125 MG
125 TABLET,CHEWABLE ORAL 2 TIMES DAILY PRN
COMMUNITY

## 2023-06-29 RX ORDER — CLONAZEPAM 0.5 MG/1
0.5 TABLET ORAL NIGHTLY
COMMUNITY

## 2023-06-29 RX ORDER — BISACODYL 10 MG
10 SUPPOSITORY, RECTAL RECTAL
COMMUNITY

## 2023-06-29 RX ORDER — ACETAMINOPHEN 325 MG/1
650 TABLET ORAL 3 TIMES DAILY PRN
COMMUNITY

## 2023-07-07 ENCOUNTER — HOSPITAL ENCOUNTER (OUTPATIENT)
Dept: SLEEP CENTER | Age: 88
Discharge: HOME | End: 2023-07-07
Attending: INTERNAL MEDICINE

## 2023-07-07 DIAGNOSIS — G47.19 EXCESSIVE DAYTIME SLEEPINESS: ICD-10-CM

## 2023-07-07 DIAGNOSIS — G47.33 OSA (OBSTRUCTIVE SLEEP APNEA): ICD-10-CM

## 2023-07-11 PROBLEM — R09.02 HYPOXEMIA: Status: ACTIVE | Noted: 2023-07-11

## 2023-07-11 PROBLEM — G47.19 EXCESSIVE DAYTIME SLEEPINESS: Status: ACTIVE | Noted: 2023-07-11

## 2023-07-14 ENCOUNTER — ANESTHESIA EVENT (OUTPATIENT)
Dept: OPERATING ROOM | Age: 88
End: 2023-07-14
Payer: COMMERCIAL

## 2023-07-17 ENCOUNTER — ANESTHESIA (OUTPATIENT)
Dept: OPERATING ROOM | Age: 88
End: 2023-07-17
Payer: COMMERCIAL

## 2023-07-17 ENCOUNTER — HOSPITAL ENCOUNTER (OUTPATIENT)
Age: 88
Setting detail: OUTPATIENT SURGERY
Discharge: HOME | End: 2023-07-17
Attending: UROLOGY | Admitting: UROLOGY
Payer: COMMERCIAL

## 2023-07-17 VITALS
BODY MASS INDEX: 34.63 KG/M2 | OXYGEN SATURATION: 98 % | SYSTOLIC BLOOD PRESSURE: 126 MMHG | RESPIRATION RATE: 16 BRPM | WEIGHT: 165 LBS | HEART RATE: 70 BPM | HEIGHT: 58 IN | DIASTOLIC BLOOD PRESSURE: 72 MMHG | TEMPERATURE: 97.8 F

## 2023-07-17 PROCEDURE — 3700000000 HC ANESTHESIA ATTENDED CARE: Performed by: UROLOGY

## 2023-07-17 PROCEDURE — 2709999900 HC NON-CHARGEABLE SUPPLY: Performed by: UROLOGY

## 2023-07-17 PROCEDURE — 3600000002 HC SURGERY LEVEL 2 BASE: Performed by: UROLOGY

## 2023-07-17 PROCEDURE — 2580000003 HC RX 258: Performed by: UROLOGY

## 2023-07-17 PROCEDURE — 6360000002 HC RX W HCPCS: Performed by: UROLOGY

## 2023-07-17 PROCEDURE — 6370000000 HC RX 637 (ALT 250 FOR IP): Performed by: ANESTHESIOLOGY

## 2023-07-17 PROCEDURE — 6360000002 HC RX W HCPCS: Performed by: NURSE ANESTHETIST, CERTIFIED REGISTERED

## 2023-07-17 PROCEDURE — 2580000003 HC RX 258: Performed by: ANESTHESIOLOGY

## 2023-07-17 PROCEDURE — 7100000001 HC PACU RECOVERY - ADDTL 15 MIN: Performed by: UROLOGY

## 2023-07-17 PROCEDURE — 7100000011 HC PHASE II RECOVERY - ADDTL 15 MIN: Performed by: UROLOGY

## 2023-07-17 PROCEDURE — 7100000010 HC PHASE II RECOVERY - FIRST 15 MIN: Performed by: UROLOGY

## 2023-07-17 PROCEDURE — 3700000001 HC ADD 15 MINUTES (ANESTHESIA): Performed by: UROLOGY

## 2023-07-17 PROCEDURE — 3600000012 HC SURGERY LEVEL 2 ADDTL 15MIN: Performed by: UROLOGY

## 2023-07-17 PROCEDURE — 7100000000 HC PACU RECOVERY - FIRST 15 MIN: Performed by: UROLOGY

## 2023-07-17 RX ORDER — SODIUM CHLORIDE 0.9 % (FLUSH) 0.9 %
5-40 SYRINGE (ML) INJECTION PRN
Status: CANCELLED | OUTPATIENT
Start: 2023-07-17

## 2023-07-17 RX ORDER — SODIUM CHLORIDE 9 MG/ML
INJECTION, SOLUTION INTRAVENOUS PRN
Status: DISCONTINUED | OUTPATIENT
Start: 2023-07-17 | End: 2023-07-17 | Stop reason: HOSPADM

## 2023-07-17 RX ORDER — CIPROFLOXACIN 250 MG/1
250 TABLET, FILM COATED ORAL 2 TIMES DAILY
Qty: 6 TABLET | Refills: 0 | Status: SHIPPED | OUTPATIENT
Start: 2023-07-17 | End: 2023-07-20

## 2023-07-17 RX ORDER — SODIUM CHLORIDE 0.9 % (FLUSH) 0.9 %
5-40 SYRINGE (ML) INJECTION EVERY 12 HOURS SCHEDULED
Status: DISCONTINUED | OUTPATIENT
Start: 2023-07-17 | End: 2023-07-17 | Stop reason: HOSPADM

## 2023-07-17 RX ORDER — MEPERIDINE HYDROCHLORIDE 25 MG/ML
12.5 INJECTION INTRAMUSCULAR; INTRAVENOUS; SUBCUTANEOUS EVERY 5 MIN PRN
Status: CANCELLED | OUTPATIENT
Start: 2023-07-17

## 2023-07-17 RX ORDER — SODIUM CHLORIDE 0.9 % (FLUSH) 0.9 %
5-40 SYRINGE (ML) INJECTION EVERY 12 HOURS SCHEDULED
Status: CANCELLED | OUTPATIENT
Start: 2023-07-17

## 2023-07-17 RX ORDER — SODIUM CHLORIDE 9 MG/ML
INJECTION, SOLUTION INTRAVENOUS PRN
Status: CANCELLED | OUTPATIENT
Start: 2023-07-17

## 2023-07-17 RX ORDER — SODIUM CHLORIDE 0.9 % (FLUSH) 0.9 %
5-40 SYRINGE (ML) INJECTION PRN
Status: DISCONTINUED | OUTPATIENT
Start: 2023-07-17 | End: 2023-07-17 | Stop reason: HOSPADM

## 2023-07-17 RX ORDER — OXYCODONE HYDROCHLORIDE 5 MG/1
5 TABLET ORAL
Status: CANCELLED | OUTPATIENT
Start: 2023-07-17 | End: 2023-07-18

## 2023-07-17 RX ORDER — FENTANYL CITRATE 50 UG/ML
25 INJECTION, SOLUTION INTRAMUSCULAR; INTRAVENOUS EVERY 5 MIN PRN
Status: CANCELLED | OUTPATIENT
Start: 2023-07-17

## 2023-07-17 RX ORDER — ONDANSETRON 2 MG/ML
4 INJECTION INTRAMUSCULAR; INTRAVENOUS
Status: CANCELLED | OUTPATIENT
Start: 2023-07-17 | End: 2023-07-18

## 2023-07-17 RX ORDER — CIPROFLOXACIN 2 MG/ML
400 INJECTION, SOLUTION INTRAVENOUS ONCE
Status: COMPLETED | OUTPATIENT
Start: 2023-07-17 | End: 2023-07-17

## 2023-07-17 RX ORDER — DROPERIDOL 2.5 MG/ML
0.62 INJECTION, SOLUTION INTRAMUSCULAR; INTRAVENOUS
Status: CANCELLED | OUTPATIENT
Start: 2023-07-17 | End: 2023-07-18

## 2023-07-17 RX ORDER — MAGNESIUM HYDROXIDE 1200 MG/15ML
LIQUID ORAL
Status: COMPLETED | OUTPATIENT
Start: 2023-07-17 | End: 2023-07-17

## 2023-07-17 RX ORDER — PROPOFOL 10 MG/ML
INJECTION, EMULSION INTRAVENOUS CONTINUOUS PRN
Status: DISCONTINUED | OUTPATIENT
Start: 2023-07-17 | End: 2023-07-17 | Stop reason: SDUPTHER

## 2023-07-17 RX ADMIN — SODIUM CHLORIDE: 9 INJECTION, SOLUTION INTRAVENOUS at 13:05

## 2023-07-17 RX ADMIN — CARBIDOPA AND LEVODOPA 1 TABLET: 25; 100 TABLET ORAL at 12:25

## 2023-07-17 RX ADMIN — CIPROFLOXACIN 400 MG: 2 INJECTION, SOLUTION INTRAVENOUS at 13:05

## 2023-07-17 RX ADMIN — PROPOFOL 50 MCG/KG/MIN: 10 INJECTION, EMULSION INTRAVENOUS at 13:09

## 2023-07-17 ASSESSMENT — ENCOUNTER SYMPTOMS: SHORTNESS OF BREATH: 1

## 2023-07-17 ASSESSMENT — PAIN SCALES - GENERAL: PAINLEVEL_OUTOF10: 0

## 2023-07-17 ASSESSMENT — PAIN - FUNCTIONAL ASSESSMENT: PAIN_FUNCTIONAL_ASSESSMENT: 0-10

## 2023-07-17 NOTE — PROGRESS NOTES
Called to give report. Left VM with nursing facility. Called transport to take to back to [facility].

## 2023-07-17 NOTE — ANESTHESIA PRE PROCEDURE
POCCL, POCBUN, POCHEMO, POCHCT in the last 72 hours. Coags:   Lab Results   Component Value Date/Time    PROTIME 18.0 02/26/2021 12:39 AM    INR 1.54 02/26/2021 12:39 AM    APTT 36.4 02/26/2021 05:13 AM       HCG (If Applicable): No results found for: PREGTESTUR, PREGSERUM, HCG, HCGQUANT     ABGs:   Lab Results   Component Value Date/Time    PHART 7.395 12/08/2022 06:40 PM    PO2ART 88.0 12/08/2022 06:40 PM    KTN5STU 58.6 12/08/2022 06:40 PM    DPJ2DXY 35.9 12/08/2022 06:40 PM    BEART 9.2 12/08/2022 06:40 PM    O0DYIBTK 97.8 12/08/2022 06:40 PM        Type & Screen (If Applicable):  No results found for: LABABO, LABRH    Drug/Infectious Status (If Applicable):  No results found for: HIV, HEPCAB    COVID-19 Screening (If Applicable):   Lab Results   Component Value Date/Time    COVID19 Not Detected 12/12/2022 05:32 PM           Anesthesia Evaluation  Patient summary reviewed no history of anesthetic complications:   Airway: Mallampati: II  TM distance: >3 FB   Neck ROM: full  Mouth opening: > = 3 FB   Dental:      Comment: No loose teeth    Pulmonary:normal exam  breath sounds clear to auscultation  (+) shortness of breath (2L continuous):  sleep apnea:                             Cardiovascular:  Exercise tolerance: poor (<4 METS),   (+) hypertension:, orthopnea (2 pillow orthopnea, stble), ADAIR:, hyperlipidemia    (-) past MI, CAD and CABG/stent      Rhythm: regular  Rate: normal                 ROS comment: TTE 2020:  Conclusions      Summary   There is conc. LVH; EF    60%. Grade I diastolic dysfunction with normal LV   filling pressures. Mild mitral regurgitation. The left atrium is normal in size. Normal right ventricular size and function. Trivial aortic regurgitation. Mild tricuspid regurgitation.         Neuro/Psych:   (+) neuromuscular disease: Parkinson's disease, psychiatric history:depression/anxiety             GI/Hepatic/Renal:   (+) hiatal hernia,      (-) liver disease and no renal

## 2023-07-17 NOTE — BRIEF OP NOTE
Brief Postoperative Note      Patient: Domitila Richard  YOB: 1935  MRN: [id number]    Date of Procedure: 7/17/2023    Pre-Op Diagnosis Codes:     * Urge incontinence [N39.41] neurogenic bladder    Post-Op Diagnosis: Post-Op Diagnosis Codes:     * Urge incontinence [N39.41]     Neurogenic bladder  Procedure(s):  CYSTOSCOPY WITH INTRAVESICAL INJECTION  UNITS OF BOTOX  SP tube exchange    Surgeon(s):  June Boyer MD    Assistant:  Surgical Assistant: Scott Gabriel    Anesthesia: Monitor Anesthesia Care    Estimated Blood Loss (mL): Minimal    Complications: None    Specimens:   * No specimens in log *    Implants:  * No implants in log *      Drains: * No LDAs found *    Findings: normal cysto      Electronically signed by June Boyer MD on 7/17/2023 at 1:31 PM

## 2023-07-17 NOTE — DISCHARGE INSTRUCTIONS
No activity restrictions. The Botox will take 1-2 weeks to start having an effect on urinary frequency and incontinence. Some hematuria is expected after the procedure. Call the office for fever > 101 or inability to urinate.   Follow up in 4-6 weeks

## 2023-07-17 NOTE — ANESTHESIA POSTPROCEDURE EVALUATION
Department of Anesthesiology  Postprocedure Note    Patient: Rand Pinto  MRN: [id number]  YOB: 1935  Date of evaluation: 7/17/2023      Procedure Summary     Date: 07/17/23 Room / Location: [facility]    Anesthesia Start: 3595 Anesthesia Stop: 9186    Procedure: CYSTOSCOPY WITH INTRAVESICAL INJECTION  UNITS OF BOTOX Diagnosis:       Urge incontinence      ([facility])    Surgeons: Staci Uribe MD Responsible Provider: Jessenia Mora MD    Anesthesia Type: MAC ASA Status: 3          Anesthesia Type: No value filed.     Ainsley Phase I: Ainsley Score: 8    Ainsley Phase II:        Anesthesia Post Evaluation    Patient location during evaluation: PACU  Patient participation: complete - patient participated  Level of consciousness: awake and alert  Pain score: 0  Airway patency: patent  Nausea & Vomiting: no nausea and no vomiting  Complications: no  Cardiovascular status: blood pressure returned to baseline  Respiratory status: acceptable  Hydration status: euvolemic

## 2023-07-17 NOTE — INTERVAL H&P NOTE
Update History & Physical    The patient's History and Physical   was reviewed with the patient and I examined the patient. There was no change. The surgical site was confirmed by the patient and me. Plan: The risks, benefits, expected outcome, and alternative to the recommended procedure have been discussed with the patient. Patient understands and wants to proceed with the procedure.      Electronically signed by June Boyer MD on 7/17/2023 at 1:30 PM

## 2023-07-18 NOTE — OP NOTE
[address], [address] [place] [address] Way                                OPERATIVE REPORT    PATIENT NAME: Duc Salcedo                   :        1935  MED REC NO:   [id number]                          ROOM:  ACCOUNT NO:   [de-identified]                           ADMIT DATE: 2023  PROVIDER:     Muriel Rabago MD    DATE OF PROCEDURE:  2023    PREOPERATIVE DIAGNOSIS:  Neurogenic bladder with urgency incontinence. POSTOPERATIVE DIAGNOSIS:  Neurogenic bladder with urgency incontinence. OPERATION PERFORMED:  Cystoscopy, intravesical injection of Botox 200  units and suprapubic catheter exchange. SURGEON:  Muriel Rabago MD    ANESTHESIA:  MAC.    COMPLICATIONS:  None. BLOOD LOSS:  Minimal.    INDICATION:  An 30-year-old female who suffers from Parkinson's disease  resulting in neurogenic bladder. She is managed with a suprapubic  catheter, but she has frequent episodes of incontinence per urethra  likely related to bladder spasms. This has been controlled in the past  with Botox injections and she is here for another Botox injection. OPERATIVE PROCEDURE:  She was taken to the cystoscopy suite. She moved  onto the table. Anesthesia was induced. Her position was changed to  the lithotomy position. Her indwelling suprapubic catheter was removed. The site was prepped and a new 16-Kiswahili catheter was inserted. The  balloon was filled with 10 mL of water and the catheter was clamped. Then her genitalia were prepped and draped and the 21-Kiswahili cystoscope  advanced through the urethra into the bladder. The bladder showed no  abnormalities. The suprapubic tube was noted at the dome of the  bladder; 200 units of Botox were reconstituted in 20 mL of injectable  saline. The injection needle was inserted and flushed. The Botox was  then injected in 1 mL increments throughout the bladder.   A

## 2023-07-20 ENCOUNTER — TELEPHONE (OUTPATIENT)
Dept: PULMONOLOGY | Age: 88
End: 2023-07-20

## 2023-07-20 NOTE — TELEPHONE ENCOUNTER
Sleep study --Inconclusive study with no significant sleep disordered breathing.   AHI 3.1/hr  lowest O2 72 %    Pt notified of resulsts

## 2023-09-14 ENCOUNTER — TELEPHONE (OUTPATIENT)
Dept: PULMONOLOGY | Age: 88
End: 2023-09-14

## 2023-09-14 NOTE — TELEPHONE ENCOUNTER
Divya Espino from [facility] calls in asking for a updated oxygen order to be faxed in to [phone]

## 2024-01-15 NOTE — PROGRESS NOTES
Nursing Home Patient Worksheet-Pre Admission Testing Department  :37  Day of Surgery:   Surgeon: Sonia    Arrival Time: 12   Surgery Time:1325  Facility: [facility]  Nurse or Care provider: Kellie  Contact number: [phone](Longterm care)  Fax number:     Day of Surgery Information  Can Patient sign own consent?   [x] YES    []  NO    H/P: Complete:      [x] YES    []  NO    []  N/A    Labs:        [] YES    [x]  NO   [x]  N/A    Transportation confirmed:     [x] YES    []  NO  [employer]. [phone]  Will STNA be provided:     [] YES    [x]  NO    Medication Reconciliation Complete:  [x] YES    []  NO    Demographics (Med History) Complete:  [x] YES    []  NO    Pt. Ambulation Status: wheel chair/ pivot x1, Fall risk.  Pt. LOC: pt can sign her own consent, daughter Taylor will come DOS    PAT INTERVIEW COMPLETE:    [x] YES   []  NO  With Kellie and DaughterTaylor    Additional Notes:   Update: 1/15:  Follow up with Kellie on -she is working on HP(she has our fax number)

## 2024-01-15 NOTE — PROGRESS NOTES
[facility] PRE-OPERATIVE INSTRUCTIONS    Day of Procedure: 2/1       Arrival time:   1200             Surgery time: 1325    Take the following medications with a sip of water:  Follow your MD/Surgeons pre-procedure instructions regarding your medications     Please call Dr. Del Valle' office with any questions:  [phone]    Do not eat or drink anything after 12:00 midnight prior to your surgery.  This includes water chewing gum, mints and ice chips.   You may brush your teeth and gargle the morning of your surgery, but do not swallow the water     Please see your family doctor/pediatrician for a history and physical and/or concerning medications.   Bring any test results/reports from your physicians office.   If you are under the care of a heart doctor or specialist doctor, please be aware that you may be asked to them for clearance    You may be asked to stop blood thinners such as Coumadin, Plavix, Fragmin, Lovenox, etc., or any anti-inflammatories such as:  Aspirin, Ibuprofen, Advil, Naproxen prior to your surgery.    We also ask that you stop any OTC medications such as fish oil, vitamin E, glucosamine, garlic, Multivitamins, COQ 10, etc.    We ask that you do not smoke 24 hours prior to surgery  We ask that you do not  drink any alcoholic beverages 24 hours prior to surgery     You must make arrangements for a responsible adult to take you home after your surgery.    For your safety you will not be allowed to leave alone or drive yourself home.  Your surgery will be cancelled if you do not have a ride home.     Also for your safety, it is strongly suggested that someone stay with you the first 24 hours after your surgery.     A parent or legal guardian must accompany a child scheduled for surgery and plan to stay at the hospital until the child is discharged.    Please do not bring other children with you.    For your comfort, please wear simple loose fitting clothing to the hospital.   Self

## 2024-01-23 RX ORDER — LEVOFLOXACIN 500 MG/1
500 TABLET, FILM COATED ORAL DAILY
COMMUNITY

## 2024-01-23 RX ORDER — TRAZODONE HYDROCHLORIDE 50 MG/1
50 TABLET ORAL NIGHTLY
COMMUNITY

## 2024-01-23 RX ORDER — SENNOSIDES A AND B 8.6 MG/1
1 TABLET, FILM COATED ORAL EVERY EVENING
COMMUNITY

## 2024-01-23 RX ORDER — IPRATROPIUM BROMIDE AND ALBUTEROL SULFATE 2.5; .5 MG/3ML; MG/3ML
1 SOLUTION RESPIRATORY (INHALATION) EVERY 4 HOURS PRN
COMMUNITY

## 2024-01-23 RX ORDER — LIDOCAINE 5 %
KIT TOPICAL DAILY
COMMUNITY

## 2024-01-23 NOTE — PROGRESS NOTES
Follow Up Prior to Surgery    DOS:   :1935      History and Physical:  Kellie at [facility] will fax over  [phone] either on  or   Chart is complete.  Once you receive the HP, please update the RN worksheet for [facility].    Hp sent and will be scanned into epic-per Abby

## 2024-01-31 ENCOUNTER — ANESTHESIA EVENT (OUTPATIENT)
Dept: OPERATING ROOM | Age: 89
End: 2024-01-31
Payer: COMMERCIAL

## 2024-01-31 NOTE — PROGRESS NOTES
DOS 2/1/24  DAN[facility] NUR[facility] CALLED AND SAID SOME OF HER LABS CAME BACK ABNORMAL-PAT RN ASKED DANITA TO CALL THE UROLOGY GROUP-DR. BARRERA'S OFFICE TO MAKE THEM AWARE    UPDATE:1/31/24: DR. 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 OSMAN NOTIFIED ANESTHESIOLOGIST AND MADE AWARE HE SAID IT SHOULDN'T BE A ISSUE BUT TO MAKE DR. BARRERA AWARE-DOMINGO CALLED AGAIN AT OFFICE AND PER DOMINGO SHE WILL MAKE DR. GUTIERREZ AWARE OF PATIENT HAVING MRSA IN HER URINE    UPDATE: 1/31/24: DANITA NURSE FROM [facility] CALLED BACK AND SAID THAT CANDIDO JUST CAME IN TO SEE PATIENT AND THAT PER CANDIDO PATIENT HAS HAD MRSA IN HER URINE FOR A LONG WHILE    UPDATE: 1/31/24: THEO FROM DR. BARRERA'S OFFICE CALLED BACK AND SAID PER DR. BARRERA PATIENT IS OK TO STILL HAVE SURGERY AND PATIENT SHOULD TAKE HER ATB WITH SIP OF WATER DOS-ELIZA WITH DR. BARRERA CALLED [facility] TO LET PATIENT'S NURSE AWARE AND ASKED IF NH GIVES PATIENT ATB AM OF SURGERY WITH SIP OF WATER. ADDIS MACIEL CLINICAL COORDINATOR OF PRE/POST/PAT/PACU MADE AWARE

## 2024-02-01 ENCOUNTER — HOSPITAL ENCOUNTER (OUTPATIENT)
Age: 89
Setting detail: OUTPATIENT SURGERY
Discharge: NURSING, REHAB OR LONG-TERM CARE | End: 2024-02-01
Attending: UROLOGY | Admitting: UROLOGY
Payer: COMMERCIAL

## 2024-02-01 ENCOUNTER — ANESTHESIA (OUTPATIENT)
Dept: OPERATING ROOM | Age: 89
End: 2024-02-01
Payer: COMMERCIAL

## 2024-02-01 VITALS
RESPIRATION RATE: 16 BRPM | WEIGHT: 160 LBS | HEIGHT: 58 IN | HEART RATE: 73 BPM | SYSTOLIC BLOOD PRESSURE: 143 MMHG | OXYGEN SATURATION: 96 % | BODY MASS INDEX: 33.58 KG/M2 | TEMPERATURE: 99.1 F | DIASTOLIC BLOOD PRESSURE: 67 MMHG

## 2024-02-01 PROCEDURE — 3700000001 HC ADD 15 MINUTES (ANESTHESIA): Performed by: UROLOGY

## 2024-02-01 PROCEDURE — 3700000000 HC ANESTHESIA ATTENDED CARE: Performed by: UROLOGY

## 2024-02-01 PROCEDURE — 3600000012 HC SURGERY LEVEL 2 ADDTL 15MIN: Performed by: UROLOGY

## 2024-02-01 PROCEDURE — 2580000003 HC RX 258: Performed by: UROLOGY

## 2024-02-01 PROCEDURE — 6360000002 HC RX W HCPCS: Performed by: UROLOGY

## 2024-02-01 PROCEDURE — 7100000000 HC PACU RECOVERY - FIRST 15 MIN: Performed by: UROLOGY

## 2024-02-01 PROCEDURE — 2500000003 HC RX 250 WO HCPCS

## 2024-02-01 PROCEDURE — 2709999900 HC NON-CHARGEABLE SUPPLY: Performed by: UROLOGY

## 2024-02-01 PROCEDURE — 2580000003 HC RX 258: Performed by: ANESTHESIOLOGY

## 2024-02-01 PROCEDURE — 6360000002 HC RX W HCPCS: Performed by: ANESTHESIOLOGY

## 2024-02-01 PROCEDURE — 7100000011 HC PHASE II RECOVERY - ADDTL 15 MIN: Performed by: UROLOGY

## 2024-02-01 PROCEDURE — 6360000002 HC RX W HCPCS

## 2024-02-01 PROCEDURE — 3600000002 HC SURGERY LEVEL 2 BASE: Performed by: UROLOGY

## 2024-02-01 PROCEDURE — 7100000001 HC PACU RECOVERY - ADDTL 15 MIN: Performed by: UROLOGY

## 2024-02-01 PROCEDURE — 7100000010 HC PHASE II RECOVERY - FIRST 15 MIN: Performed by: UROLOGY

## 2024-02-01 RX ORDER — FENTANYL CITRATE 0.05 MG/ML
25 INJECTION, SOLUTION INTRAMUSCULAR; INTRAVENOUS EVERY 5 MIN PRN
Status: DISCONTINUED | OUTPATIENT
Start: 2024-02-01 | End: 2024-02-01 | Stop reason: HOSPADM

## 2024-02-01 RX ORDER — ACETAMINOPHEN 325 MG/1
650 TABLET ORAL
Status: DISCONTINUED | OUTPATIENT
Start: 2024-02-01 | End: 2024-02-01 | Stop reason: HOSPADM

## 2024-02-01 RX ORDER — CIPROFLOXACIN 2 MG/ML
400 INJECTION, SOLUTION INTRAVENOUS ONCE
Status: COMPLETED | OUTPATIENT
Start: 2024-02-01 | End: 2024-02-01

## 2024-02-01 RX ORDER — LIDOCAINE HYDROCHLORIDE 20 MG/ML
INJECTION, SOLUTION EPIDURAL; INFILTRATION; INTRACAUDAL; PERINEURAL PRN
Status: DISCONTINUED | OUTPATIENT
Start: 2024-02-01 | End: 2024-02-01 | Stop reason: SDUPTHER

## 2024-02-01 RX ORDER — SODIUM CHLORIDE 0.9 % (FLUSH) 0.9 %
5-40 SYRINGE (ML) INJECTION PRN
Status: DISCONTINUED | OUTPATIENT
Start: 2024-02-01 | End: 2024-02-01 | Stop reason: HOSPADM

## 2024-02-01 RX ORDER — ONDANSETRON 2 MG/ML
4 INJECTION INTRAMUSCULAR; INTRAVENOUS
Status: DISCONTINUED | OUTPATIENT
Start: 2024-02-01 | End: 2024-02-01 | Stop reason: HOSPADM

## 2024-02-01 RX ORDER — SODIUM CHLORIDE 0.9 % (FLUSH) 0.9 %
5-40 SYRINGE (ML) INJECTION EVERY 12 HOURS SCHEDULED
Status: DISCONTINUED | OUTPATIENT
Start: 2024-02-01 | End: 2024-02-01 | Stop reason: HOSPADM

## 2024-02-01 RX ORDER — FENTANYL CITRATE 50 UG/ML
INJECTION, SOLUTION INTRAMUSCULAR; INTRAVENOUS PRN
Status: DISCONTINUED | OUTPATIENT
Start: 2024-02-01 | End: 2024-02-01 | Stop reason: SDUPTHER

## 2024-02-01 RX ORDER — MAGNESIUM HYDROXIDE 1200 MG/15ML
LIQUID ORAL
Status: COMPLETED | OUTPATIENT
Start: 2024-02-01 | End: 2024-02-01

## 2024-02-01 RX ORDER — SODIUM CHLORIDE 9 MG/ML
INJECTION, SOLUTION INTRAVENOUS PRN
Status: DISCONTINUED | OUTPATIENT
Start: 2024-02-01 | End: 2024-02-01 | Stop reason: HOSPADM

## 2024-02-01 RX ORDER — MEPERIDINE HYDROCHLORIDE 25 MG/ML
12.5 INJECTION INTRAMUSCULAR; INTRAVENOUS; SUBCUTANEOUS
Status: DISCONTINUED | OUTPATIENT
Start: 2024-02-01 | End: 2024-02-01 | Stop reason: HOSPADM

## 2024-02-01 RX ORDER — FLUCONAZOLE 150 MG/1
150 TABLET ORAL ONCE
Qty: 1 TABLET | Refills: 0 | Status: SHIPPED | OUTPATIENT
Start: 2024-02-01 | End: 2024-02-01

## 2024-02-01 RX ORDER — FENTANYL CITRATE 0.05 MG/ML
50 INJECTION, SOLUTION INTRAMUSCULAR; INTRAVENOUS EVERY 5 MIN PRN
Status: DISCONTINUED | OUTPATIENT
Start: 2024-02-01 | End: 2024-02-01 | Stop reason: HOSPADM

## 2024-02-01 RX ORDER — PROPOFOL 10 MG/ML
INJECTION, EMULSION INTRAVENOUS PRN
Status: DISCONTINUED | OUTPATIENT
Start: 2024-02-01 | End: 2024-02-01 | Stop reason: SDUPTHER

## 2024-02-01 RX ADMIN — CIPROFLOXACIN 400 MG: 2 INJECTION, SOLUTION INTRAVENOUS at 14:50

## 2024-02-01 RX ADMIN — LIDOCAINE HYDROCHLORIDE 60 MG: 20 INJECTION, SOLUTION EPIDURAL; INFILTRATION; INTRACAUDAL; PERINEURAL at 14:45

## 2024-02-01 RX ADMIN — FENTANYL CITRATE 25 MCG: 0.05 INJECTION, SOLUTION INTRAMUSCULAR; INTRAVENOUS at 15:37

## 2024-02-01 RX ADMIN — PROPOFOL 50 MCG/KG/MIN: 10 INJECTION, EMULSION INTRAVENOUS at 14:46

## 2024-02-01 RX ADMIN — SODIUM CHLORIDE: 9 INJECTION, SOLUTION INTRAVENOUS at 14:38

## 2024-02-01 RX ADMIN — FENTANYL CITRATE 12.5 MCG: 50 INJECTION INTRAMUSCULAR; INTRAVENOUS at 14:50

## 2024-02-01 RX ADMIN — PROPOFOL 20 MG: 10 INJECTION, EMULSION INTRAVENOUS at 14:45

## 2024-02-01 ASSESSMENT — PAIN - FUNCTIONAL ASSESSMENT
PAIN_FUNCTIONAL_ASSESSMENT: PREVENTS OR INTERFERES SOME ACTIVE ACTIVITIES AND ADLS
PAIN_FUNCTIONAL_ASSESSMENT: ADULT NONVERBAL PAIN SCALE (NPVS)
PAIN_FUNCTIONAL_ASSESSMENT: PREVENTS OR INTERFERES SOME ACTIVE ACTIVITIES AND ADLS
PAIN_FUNCTIONAL_ASSESSMENT: 0-10
PAIN_FUNCTIONAL_ASSESSMENT: PREVENTS OR INTERFERES SOME ACTIVE ACTIVITIES AND ADLS
PAIN_FUNCTIONAL_ASSESSMENT: ACTIVITIES ARE NOT PREVENTED

## 2024-02-01 ASSESSMENT — PAIN DESCRIPTION - LOCATION
LOCATION: ABDOMEN

## 2024-02-01 ASSESSMENT — PAIN SCALES - GENERAL
PAINLEVEL_OUTOF10: 5
PAINLEVEL_OUTOF10: 3
PAINLEVEL_OUTOF10: 3
PAINLEVEL_OUTOF10: 0

## 2024-02-01 ASSESSMENT — PAIN DESCRIPTION - DESCRIPTORS
DESCRIPTORS: SORE
DESCRIPTORS: SORE
DESCRIPTORS: DISCOMFORT
DESCRIPTORS: SORE

## 2024-02-01 ASSESSMENT — PAIN DESCRIPTION - ONSET
ONSET: ON-GOING

## 2024-02-01 ASSESSMENT — PAIN DESCRIPTION - FREQUENCY
FREQUENCY: CONTINUOUS

## 2024-02-01 ASSESSMENT — PAIN DESCRIPTION - ORIENTATION
ORIENTATION: LOWER

## 2024-02-01 ASSESSMENT — PAIN DESCRIPTION - PAIN TYPE
TYPE: SURGICAL PAIN

## 2024-02-01 ASSESSMENT — ENCOUNTER SYMPTOMS: SHORTNESS OF BREATH: 1

## 2024-02-01 NOTE — PROGRESS NOTES
Pt opens eyes to voice. Complain of soreness to SP site. Pt falls back to sleep easily. Surgeon stopped by to see pt. Prescription received and will send with pt.

## 2024-02-01 NOTE — PROGRESS NOTES
Pt arrived to phase 2 from PACU. Pt awake and alert. On 2l/nc (baseline). Abd soft. SP catheter draining clear, blood tinged urine. Pt rates surgical pain as 2/10.

## 2024-02-01 NOTE — PROGRESS NOTES
Pt sleeping on arrival to PACU. O2 at 2L. VSS. Sp tube draining red drainage. No sign of pain at present.

## 2024-02-01 NOTE — ANESTHESIA PRE PROCEDURE
Department of Anesthesiology  Preprocedure Note       Name:  Va Benitez   Age:  88 y.o.  :  1935                                          MRN:  [id number]         Date:  2024      Surgeon: Surgeon(s):  Viki Morocho MD    Procedure: Procedure(s):  CYSTOSCOPY BLADDER BOTOX INJECTION 200 UNITS    Medications prior to admission:   Prior to Admission medications    Medication Sig Start Date End Date Taking? Authorizing Provider   diclofenac sodium (VOLTAREN) 1 % GEL Apply 4 g topically 2 times daily   Yes Brayden Werner MD   levoFLOXacin (LEVAQUIN) 500 MG tablet Take 1 tablet by mouth daily   Yes Brayden Werner MD   carbidopa-levodopa (SINEMET)  MG per tablet Take 1 tablet by mouth daily   Yes Brayden Werner MD   traZODone (DESYREL) 50 MG tablet Take 1 tablet by mouth nightly   Yes Brayden Werner MD   senna (SENOKOT) 8.6 MG tablet Take 1 tablet by mouth every evening   Yes Brayden Werner MD   Lidocaine-Adhesive Sheets (LIDOPURE PATCH) 5 % KIT Apply topically daily   Yes ProviderBrayden MD   ipratropium 0.5 mg-albuterol 2.5 mg (DUONEB) 0.5-2.5 (3) MG/3ML SOLN nebulizer solution Inhale 3 mLs into the lungs every 4 hours as needed for Shortness of Breath   Yes Brayden Werner MD   simethicone (MYLICON) 125 MG chewable tablet Take 1 tablet by mouth 2 times daily as needed for Flatulence    ProviderBrayden MD   bisacodyl (DULCOLAX) 10 MG suppository Place 1 suppository rectally every 72 hours as needed for Constipation    Brayden Werner MD   fluorometholone (FLAREX) 0.1 % ophthalmic suspension Place 1 drop into both eyes 2 times daily as needed    Brayden Werner MD   acetaminophen (TYLENOL) 325 MG tablet Take 2 tablets by mouth 3 times daily as needed for Pain    Brayden Werner MD   carbidopa-levodopa (SINEMET)  MG per tablet Take 3 tablets by mouth Daily with supper At 6pm    Brayden Werner MD   OXYGEN

## 2024-02-01 NOTE — BRIEF OP NOTE
Brief Postoperative Note      Patient: Va Benitez  YOB: 1935  MRN: [id number]    Date of Procedure: 2/1/2024    Pre-Op Diagnosis Codes:     * Neuromuscular dysfunction of bladder [N31.9], urge incontinence    Post-Op Diagnosis: Same       Procedure(s):  CYSTOSCOPY BLADDER BOTOX INJECTION 200 UNITS , SP tube exchange    Surgeon(s):  Viki Morocho MD    Assistant:  Surgical Assistant: Alexander Bush    Anesthesia: Monitor Anesthesia Care    Estimated Blood Loss (mL): Minimal    Complications: None    Specimens:   * No specimens in log *    Implants:  * No implants in log *      Drains:   Suprapubic Catheter Latex (Active)       [REMOVED] Suprapubic Catheter (Removed)   Urine Color Ann 02/01/24 1330   Urine Appearance Clear 02/01/24 1330   Collection Container Leg bag 02/01/24 1330   Output (mL) 300 mL 02/01/24 1330       Findings: normal cysto      Electronically signed by Viki Morocho MD on 2/1/2024 at 3:04 PM

## 2024-02-01 NOTE — PROGRESS NOTES
Pt states \"I am pretty sore where the tube is.\" Medicated for soreness. Tylenol ordered for phase II.

## 2024-02-01 NOTE — ANESTHESIA POSTPROCEDURE EVALUATION
Department of Anesthesiology  Postprocedure Note    Patient: aV Benitez  MRN: [id number]  YOB: 1935  Date of evaluation: 2/1/2024    Procedure Summary       Date: 02/01/24 Room / Location: [address]    Anesthesia Start: 1438 Anesthesia Stop: 1510    Procedure: CYSTOSCOPY BLADDER BOTOX INJECTION 200 UNITS AND EXCHANGE OF SUPRAPUBIC CATHETER (Bladder) Diagnosis:       Neuromuscular dysfunction of bladder      (Neuromuscular dysfunction of bladder [N31.9])    Surgeons: Viki Morocho MD Responsible Provider: Velasquez Koch MD    Anesthesia Type: MAC ASA Status: 3            Anesthesia Type: No value filed.    Ainsley Phase I: Ainsley Score: 8    Ainsley Phase II:      Anesthesia Post Evaluation    Patient location during evaluation: PACU  Patient participation: complete - patient participated  Level of consciousness: awake and alert  Pain score: 2  Airway patency: patent  Nausea & Vomiting: no nausea and no vomiting  Cardiovascular status: blood pressure returned to baseline  Respiratory status: acceptable  Hydration status: euvolemic  Pain management: adequate    No notable events documented.

## 2024-02-01 NOTE — PROGRESS NOTES
Pt states \"The pain med helped. I don't want to get to sleepy because I have to get into my wheelchair.\" Pt agreeable to take Tylenol in phase II. VSS. Will call for phase II room.

## 2024-02-01 NOTE — INTERVAL H&P NOTE
Update History & Physical    The patient's History and Physical was reviewed with the patient and I examined the patient. There was no change. The surgical site was confirmed by the patient and me.     Plan: The risks, benefits, expected outcome, and alternative to the recommended procedure have been discussed with the patient. Patient understands and wants to proceed with the procedure.     Electronically signed by Viki Morocho MD on 2/1/2024 at 2:18 PM

## 2024-02-01 NOTE — DISCHARGE INSTRUCTIONS
No activity restrictions.  The Botox will take 1-2 weeks to start having an effect on urinary frequency and incontinence.  Some hematuria is expected after the procedure.    Call the office for fever > 101 or inability to urinate.  Follow up in 6 weeks.

## 2024-02-02 NOTE — OP NOTE
[facility]           [address] [place] [address]                                OPERATIVE REPORT    PATIENT NAME: FRAN HINKLE                   :        1935  MED REC NO:   [id number]                          ROOM:  ACCOUNT NO:   [id number]                           ADMIT DATE: 2024  PROVIDER:     Viki Morocho MD    DATE OF PROCEDURE:  2024    PREOPERATIVE DIAGNOSIS:  Urgency incontinence secondary to neurogenic  bladder.    POSTOPERATIVE DIAGNOSIS:   Urgency incontinence secondary to neurogenic  bladder    OPERATION PERFORMED:  Cystoscopy, intravesical injection of Botox 200  units, and suprapubic catheter exchange.    ANESTHESIA:  TIVA.    COMPLICATIONS:  None.    BLOOD LOSS:  Minimal.    INDICATIONS:  An 88-year-old female with neurogenic bladder secondary to  Parkinson's disease.  She is managed with a suprapubic catheter, but she  has occasional urgency incontinence episodes per urethra.  She is here  for Botox injection.    OPERATIVE PROCEDURE:  She was given Cipro and taken to the cystoscopy  suite.  She moved onto the table.  Anesthesia was induced.  Her position  was changed to the lithotomy position.  Her indwelling suprapubic  catheter was removed and the site was prepped.  A new 16-Nepali catheter  was inserted.  The balloon was filled with 10 mL of water.  The catheter  was plugged.  Now her genitalia were prepped and draped.  The 21-Nepali  cystoscope advanced easily through the urethra into the bladder.  The  bladder was evaluated and no abnormalities were identified.  There was  mild trabeculation.  Both ureteral orifices were normal.  The SP tube  was noted at the dome of the bladder.  200 units of Botox were  reconstituted in 20 mL of injectable saline.  The injection needle was  inserted and flushed.  The Botox was then injected in 1 mL increments  throughout the bladder.  A total of 21 injections

## 2024-09-05 NOTE — PROGRESS NOTES
[facility] ENT  Videostroboscopic Examination of the Larynx      BACKGROUND HISTORY:   PMHx of PD dx'd in 2013; medicinal management of Sinemet (25/100), Clonazepam, and Rivastigmine. Follows w/ neurology at [facility]. Currently resides at [facility] living [facility] and has familial assistance; daughter, April, in attendance. Referred by treating SLP + PCP for concerns of progressive dysphonia. Per pt, characterized vocal changes as decreased volume, weakness, and roughness; gradual worsening over the last ~6 months. Per daughter, additional difficulties w/ imprecise articulation (ie slurred speech) which impacts functional communication; endorsed pt enjoys speaking on the phone but family is unable to interpret, including voicemail's. Endorsed intermittent dysphagia characterized as sensation of bolus sticking (meats, breads, pills) and occasional cough w/ thin liquids; onset ~4 months ago and implements compensations of softer items + pills in puree to assist. Hx of dyspnea + RLD; using supplemental oxygen via nasal cannula.     Surgical/Medical History: See the above.  Hydration: <64 oz of water  Smoking History: None  Caffeine Intake: NA    PER Neurologist NOTE ([facility]), Dr. Smiley, 7/15/24:  \"Hypophonia - reviewed this is associated with PD and asked patient's daughter to review with home facility S/S what testing they want completed with ENT b/c of these symptoms that are a known progression and finding in PD. Discussed that any procedure at her current age and healthcare status will be effortful and procedures should only be considered if there is a known and agreed to treatment plan to execute, otherwise at this stage in her disease invasive procedures would not be effective in her care. Daughter will follow up with the care facility.\"    EVALUATION:   Perceptual Quality:  Pt presented with moderate hypophonia characterized by reduced conversational volume, breathiness, and roughness;

## 2024-09-06 ENCOUNTER — PROCEDURE VISIT (OUTPATIENT)
Dept: SPEECH THERAPY | Age: 89
End: 2024-09-06
Payer: COMMERCIAL

## 2024-09-06 DIAGNOSIS — J38.3 GLOTTIC INSUFFICIENCY: ICD-10-CM

## 2024-09-06 DIAGNOSIS — R49.0 DYSPHONIA: Primary | ICD-10-CM

## 2024-09-06 DIAGNOSIS — J38.3 VOCAL FOLD ATROPHY: ICD-10-CM

## 2024-09-06 DIAGNOSIS — R49.8 HYPOPHONIA: ICD-10-CM

## 2024-09-06 PROCEDURE — 31579 LARYNGOSCOPY TELESCOPIC: CPT | Performed by: SPEECH-LANGUAGE PATHOLOGIST

## 2024-09-06 PROCEDURE — 92507 TX SP LANG VOICE COMM INDIV: CPT | Performed by: SPEECH-LANGUAGE PATHOLOGIST

## 2024-09-06 PROCEDURE — 92524 BEHAVRAL QUALIT ANALYS VOICE: CPT | Performed by: SPEECH-LANGUAGE PATHOLOGIST

## 2024-12-25 ENCOUNTER — APPOINTMENT (OUTPATIENT)
Dept: GENERAL RADIOLOGY | Age: 89
DRG: 871 | End: 2024-12-25
Payer: COMMERCIAL

## 2024-12-25 ENCOUNTER — APPOINTMENT (OUTPATIENT)
Dept: CT IMAGING | Age: 89
DRG: 871 | End: 2024-12-25
Payer: COMMERCIAL

## 2024-12-25 ENCOUNTER — HOSPITAL ENCOUNTER (INPATIENT)
Age: 89
LOS: 1 days | DRG: 871 | End: 2024-12-26
Attending: STUDENT IN AN ORGANIZED HEALTH CARE EDUCATION/TRAINING PROGRAM | Admitting: INTERNAL MEDICINE
Payer: COMMERCIAL

## 2024-12-25 DIAGNOSIS — K56.600 PARTIAL SMALL BOWEL OBSTRUCTION (HCC): Primary | ICD-10-CM

## 2024-12-25 DIAGNOSIS — N39.0 ACUTE UTI: ICD-10-CM

## 2024-12-25 DIAGNOSIS — A41.9 SEPTICEMIA (HCC): ICD-10-CM

## 2024-12-25 PROBLEM — E66.01 MORBID OBESITY DUE TO EXCESS CALORIES: Status: ACTIVE | Noted: 2024-12-25

## 2024-12-25 PROBLEM — G20.A1 PARKINSON DISEASE (HCC): Status: ACTIVE | Noted: 2024-12-25

## 2024-12-25 LAB
ALBUMIN SERPL-MCNC: 4.3 G/DL (ref 3.4–5)
ALBUMIN/GLOB SERPL: 1.2 {RATIO} (ref 1.1–2.2)
ALP SERPL-CCNC: 112 U/L (ref 40–129)
ALT SERPL-CCNC: <5 U/L (ref 10–40)
ANION GAP SERPL CALCULATED.3IONS-SCNC: 15 MMOL/L (ref 3–16)
AST SERPL-CCNC: 18 U/L (ref 15–37)
BACTERIA URNS QL MICRO: ABNORMAL /HPF
BASOPHILS # BLD: 0.1 K/UL (ref 0–0.2)
BASOPHILS NFR BLD: 0.4 %
BILIRUB SERPL-MCNC: 0.3 MG/DL (ref 0–1)
BILIRUB UR QL STRIP.AUTO: NEGATIVE
BUN SERPL-MCNC: 23 MG/DL (ref 7–20)
CALCIUM SERPL-MCNC: 9.6 MG/DL (ref 8.3–10.6)
CHLORIDE SERPL-SCNC: 96 MMOL/L (ref 99–110)
CLARITY UR: ABNORMAL
CO2 SERPL-SCNC: 30 MMOL/L (ref 21–32)
COLOR UR: YELLOW
CREAT SERPL-MCNC: 1.2 MG/DL (ref 0.6–1.2)
DEPRECATED RDW RBC AUTO: 15.5 % (ref 12.4–15.4)
EOSINOPHIL # BLD: 0 K/UL (ref 0–0.6)
EOSINOPHIL NFR BLD: 0.2 %
EPI CELLS #/AREA URNS AUTO: 1 /HPF (ref 0–5)
GFR SERPLBLD CREATININE-BSD FMLA CKD-EPI: 44 ML/MIN/{1.73_M2}
GLUCOSE SERPL-MCNC: 192 MG/DL (ref 70–99)
GLUCOSE UR STRIP.AUTO-MCNC: NEGATIVE MG/DL
HCT VFR BLD AUTO: 35.5 % (ref 36–48)
HGB BLD-MCNC: 11.7 G/DL (ref 12–16)
HGB UR QL STRIP.AUTO: ABNORMAL
HYALINE CASTS #/AREA URNS AUTO: 21 /LPF (ref 0–8)
HYPHAL YEAST: PRESENT
KETONES UR STRIP.AUTO-MCNC: ABNORMAL MG/DL
LACTATE BLDV-SCNC: 1.4 MMOL/L (ref 0.4–1.9)
LACTATE BLDV-SCNC: 1.9 MMOL/L (ref 0.4–2)
LEUKOCYTE ESTERASE UR QL STRIP.AUTO: ABNORMAL
LIPASE SERPL-CCNC: 36 U/L (ref 13–60)
LYMPHOCYTES # BLD: 2.1 K/UL (ref 1–5.1)
LYMPHOCYTES NFR BLD: 14.3 %
MCH RBC QN AUTO: 30.5 PG (ref 26–34)
MCHC RBC AUTO-ENTMCNC: 33 G/DL (ref 31–36)
MCV RBC AUTO: 92.5 FL (ref 80–100)
MONOCYTES # BLD: 0.5 K/UL (ref 0–1.3)
MONOCYTES NFR BLD: 3.7 %
MUCUS: PRESENT
NEUTROPHILS # BLD: 12.2 K/UL (ref 1.7–7.7)
NEUTROPHILS NFR BLD: 81.4 %
NITRITE UR QL STRIP.AUTO: NEGATIVE
PH UR STRIP.AUTO: 5 [PH] (ref 5–8)
PLATELET # BLD AUTO: 368 K/UL (ref 135–450)
PMV BLD AUTO: 7.3 FL (ref 5–10.5)
POTASSIUM SERPL-SCNC: 4.2 MMOL/L (ref 3.5–5.1)
PROCALCITONIN SERPL IA-MCNC: 0.1 NG/ML (ref 0–0.15)
PROT SERPL-MCNC: 7.9 G/DL (ref 6.4–8.2)
PROT UR STRIP.AUTO-MCNC: 30 MG/DL
RBC # BLD AUTO: 3.84 M/UL (ref 4–5.2)
RBC CLUMPS #/AREA URNS AUTO: 21 /HPF (ref 0–4)
SODIUM SERPL-SCNC: 141 MMOL/L (ref 136–145)
SP GR UR STRIP.AUTO: 1.02 (ref 1–1.03)
TROPONIN, HIGH SENSITIVITY: 25 NG/L (ref 0–14)
TROPONIN, HIGH SENSITIVITY: 38 NG/L (ref 0–14)
UA COMPLETE W REFLEX CULTURE PNL UR: YES
UA DIPSTICK W REFLEX MICRO PNL UR: YES
URN SPEC COLLECT METH UR: ABNORMAL
UROBILINOGEN UR STRIP-ACNC: 0.2 E.U./DL
WBC # BLD AUTO: 15 K/UL (ref 4–11)
WBC #/AREA URNS AUTO: 323 /HPF (ref 0–5)

## 2024-12-25 PROCEDURE — 84145 PROCALCITONIN (PCT): CPT

## 2024-12-25 PROCEDURE — 2700000000 HC OXYGEN THERAPY PER DAY

## 2024-12-25 PROCEDURE — 36415 COLL VENOUS BLD VENIPUNCTURE: CPT

## 2024-12-25 PROCEDURE — 96374 THER/PROPH/DIAG INJ IV PUSH: CPT

## 2024-12-25 PROCEDURE — 2500000003 HC RX 250 WO HCPCS: Performed by: INTERNAL MEDICINE

## 2024-12-25 PROCEDURE — 94660 CPAP INITIATION&MGMT: CPT

## 2024-12-25 PROCEDURE — 84484 ASSAY OF TROPONIN QUANT: CPT

## 2024-12-25 PROCEDURE — 93005 ELECTROCARDIOGRAM TRACING: CPT | Performed by: STUDENT IN AN ORGANIZED HEALTH CARE EDUCATION/TRAINING PROGRAM

## 2024-12-25 PROCEDURE — 0D9670Z DRAINAGE OF STOMACH WITH DRAINAGE DEVICE, VIA NATURAL OR ARTIFICIAL OPENING: ICD-10-PCS | Performed by: INTERNAL MEDICINE

## 2024-12-25 PROCEDURE — 2580000003 HC RX 258: Performed by: STUDENT IN AN ORGANIZED HEALTH CARE EDUCATION/TRAINING PROGRAM

## 2024-12-25 PROCEDURE — 6360000002 HC RX W HCPCS: Performed by: INTERNAL MEDICINE

## 2024-12-25 PROCEDURE — 6360000002 HC RX W HCPCS: Performed by: STUDENT IN AN ORGANIZED HEALTH CARE EDUCATION/TRAINING PROGRAM

## 2024-12-25 PROCEDURE — 94640 AIRWAY INHALATION TREATMENT: CPT

## 2024-12-25 PROCEDURE — 96375 TX/PRO/DX INJ NEW DRUG ADDON: CPT

## 2024-12-25 PROCEDURE — 71045 X-RAY EXAM CHEST 1 VIEW: CPT

## 2024-12-25 PROCEDURE — 87040 BLOOD CULTURE FOR BACTERIA: CPT

## 2024-12-25 PROCEDURE — 5A09357 ASSISTANCE WITH RESPIRATORY VENTILATION, LESS THAN 24 CONSECUTIVE HOURS, CONTINUOUS POSITIVE AIRWAY PRESSURE: ICD-10-PCS | Performed by: INTERNAL MEDICINE

## 2024-12-25 PROCEDURE — 5A0935A ASSISTANCE WITH RESPIRATORY VENTILATION, LESS THAN 24 CONSECUTIVE HOURS, HIGH NASAL FLOW/VELOCITY: ICD-10-PCS | Performed by: INTERNAL MEDICINE

## 2024-12-25 PROCEDURE — 6370000000 HC RX 637 (ALT 250 FOR IP): Performed by: STUDENT IN AN ORGANIZED HEALTH CARE EDUCATION/TRAINING PROGRAM

## 2024-12-25 PROCEDURE — 83690 ASSAY OF LIPASE: CPT

## 2024-12-25 PROCEDURE — 87086 URINE CULTURE/COLONY COUNT: CPT

## 2024-12-25 PROCEDURE — 85025 COMPLETE CBC W/AUTO DIFF WBC: CPT

## 2024-12-25 PROCEDURE — 1200000000 HC SEMI PRIVATE

## 2024-12-25 PROCEDURE — 80053 COMPREHEN METABOLIC PANEL: CPT

## 2024-12-25 PROCEDURE — 6360000004 HC RX CONTRAST MEDICATION: Performed by: STUDENT IN AN ORGANIZED HEALTH CARE EDUCATION/TRAINING PROGRAM

## 2024-12-25 PROCEDURE — 74177 CT ABD & PELVIS W/CONTRAST: CPT

## 2024-12-25 PROCEDURE — 94760 N-INVAS EAR/PLS OXIMETRY 1: CPT

## 2024-12-25 PROCEDURE — 99285 EMERGENCY DEPT VISIT HI MDM: CPT

## 2024-12-25 PROCEDURE — 81001 URINALYSIS AUTO W/SCOPE: CPT

## 2024-12-25 PROCEDURE — 83605 ASSAY OF LACTIC ACID: CPT

## 2024-12-25 PROCEDURE — 6370000000 HC RX 637 (ALT 250 FOR IP): Performed by: INTERNAL MEDICINE

## 2024-12-25 RX ORDER — LIDOCAINE HYDROCHLORIDE 20 MG/ML
15 SOLUTION OROPHARYNGEAL ONCE
Status: COMPLETED | OUTPATIENT
Start: 2024-12-25 | End: 2024-12-25

## 2024-12-25 RX ORDER — LIDOCAINE 4 G/G
1 PATCH TOPICAL DAILY
COMMUNITY

## 2024-12-25 RX ORDER — AMOXICILLIN 250 MG
2 CAPSULE ORAL 2 TIMES DAILY
COMMUNITY

## 2024-12-25 RX ORDER — VANCOMYCIN 1.75 G/350ML
1250 INJECTION, SOLUTION INTRAVENOUS ONCE
Status: COMPLETED | OUTPATIENT
Start: 2024-12-25 | End: 2024-12-25

## 2024-12-25 RX ORDER — GUAIFENESIN 600 MG/1
600 TABLET, EXTENDED RELEASE ORAL 2 TIMES DAILY
COMMUNITY

## 2024-12-25 RX ORDER — ENOXAPARIN SODIUM 100 MG/ML
40 INJECTION SUBCUTANEOUS NIGHTLY
Status: DISCONTINUED | OUTPATIENT
Start: 2024-12-25 | End: 2024-12-26

## 2024-12-25 RX ORDER — PREDNISOLONE ACETATE 10 MG/ML
1 SUSPENSION/ DROPS OPHTHALMIC 2 TIMES DAILY PRN
Status: DISCONTINUED | OUTPATIENT
Start: 2024-12-25 | End: 2024-12-27 | Stop reason: HOSPADM

## 2024-12-25 RX ORDER — POLYETHYLENE GLYCOL 3350 17 G/17G
17 POWDER, FOR SOLUTION ORAL DAILY PRN
Status: DISCONTINUED | OUTPATIENT
Start: 2024-12-25 | End: 2024-12-27 | Stop reason: HOSPADM

## 2024-12-25 RX ORDER — SODIUM CHLORIDE 9 MG/ML
INJECTION, SOLUTION INTRAVENOUS PRN
Status: DISCONTINUED | OUTPATIENT
Start: 2024-12-25 | End: 2024-12-27 | Stop reason: HOSPADM

## 2024-12-25 RX ORDER — LORAZEPAM 2 MG/ML
0.5 INJECTION INTRAMUSCULAR ONCE
Status: COMPLETED | OUTPATIENT
Start: 2024-12-25 | End: 2024-12-25

## 2024-12-25 RX ORDER — ONDANSETRON 4 MG/1
4 TABLET, FILM COATED ORAL EVERY 8 HOURS PRN
COMMUNITY

## 2024-12-25 RX ORDER — MORPHINE SULFATE 2 MG/ML
2 INJECTION, SOLUTION INTRAMUSCULAR; INTRAVENOUS ONCE
Status: COMPLETED | OUTPATIENT
Start: 2024-12-25 | End: 2024-12-25

## 2024-12-25 RX ORDER — IOPAMIDOL 755 MG/ML
75 INJECTION, SOLUTION INTRAVASCULAR
Status: COMPLETED | OUTPATIENT
Start: 2024-12-25 | End: 2024-12-25

## 2024-12-25 RX ORDER — ALBUTEROL SULFATE 0.83 MG/ML
5 SOLUTION RESPIRATORY (INHALATION) ONCE
Status: COMPLETED | OUTPATIENT
Start: 2024-12-25 | End: 2024-12-25

## 2024-12-25 RX ORDER — CLOTRIMAZOLE AND BETAMETHASONE DIPROPIONATE 10; .64 MG/G; MG/G
CREAM TOPICAL 2 TIMES DAILY
COMMUNITY

## 2024-12-25 RX ORDER — ONDANSETRON 2 MG/ML
4 INJECTION INTRAMUSCULAR; INTRAVENOUS EVERY 4 HOURS PRN
Status: DISCONTINUED | OUTPATIENT
Start: 2024-12-25 | End: 2024-12-27 | Stop reason: HOSPADM

## 2024-12-25 RX ORDER — GLYCOPYRROLATE 1 MG/1
1 TABLET ORAL 3 TIMES DAILY
COMMUNITY

## 2024-12-25 RX ORDER — CLONAZEPAM 0.5 MG/1
0.25 TABLET ORAL NIGHTLY
COMMUNITY

## 2024-12-25 RX ORDER — SODIUM CHLORIDE 9 MG/ML
30 INJECTION, SOLUTION INTRAVENOUS ONCE
Status: COMPLETED | OUTPATIENT
Start: 2024-12-25 | End: 2024-12-25

## 2024-12-25 RX ORDER — ACETAMINOPHEN 650 MG/1
650 SUPPOSITORY RECTAL EVERY 4 HOURS PRN
Status: DISCONTINUED | OUTPATIENT
Start: 2024-12-25 | End: 2024-12-27 | Stop reason: HOSPADM

## 2024-12-25 RX ORDER — SODIUM CHLORIDE 0.9 % (FLUSH) 0.9 %
10 SYRINGE (ML) INJECTION EVERY 12 HOURS SCHEDULED
Status: DISCONTINUED | OUTPATIENT
Start: 2024-12-25 | End: 2024-12-27 | Stop reason: HOSPADM

## 2024-12-25 RX ORDER — ACETAMINOPHEN 325 MG/1
650 TABLET ORAL EVERY 4 HOURS PRN
Status: DISCONTINUED | OUTPATIENT
Start: 2024-12-25 | End: 2024-12-27 | Stop reason: HOSPADM

## 2024-12-25 RX ORDER — SODIUM CHLORIDE 0.9 % (FLUSH) 0.9 %
10 SYRINGE (ML) INJECTION PRN
Status: DISCONTINUED | OUTPATIENT
Start: 2024-12-25 | End: 2024-12-27 | Stop reason: HOSPADM

## 2024-12-25 RX ADMIN — LORAZEPAM 0.5 MG: 2 INJECTION INTRAMUSCULAR; INTRAVENOUS at 15:44

## 2024-12-25 RX ADMIN — IOPAMIDOL 75 ML: 755 INJECTION, SOLUTION INTRAVENOUS at 11:05

## 2024-12-25 RX ADMIN — VANCOMYCIN 1250 MG: 1.75 INJECTION, SOLUTION INTRAVENOUS at 13:38

## 2024-12-25 RX ADMIN — CEFEPIME 2000 MG: 2 INJECTION, POWDER, FOR SOLUTION INTRAVENOUS at 12:25

## 2024-12-25 RX ADMIN — SODIUM CHLORIDE 30 ML/KG/HR: 9 INJECTION, SOLUTION INTRAVENOUS at 13:37

## 2024-12-25 RX ADMIN — ALBUTEROL SULFATE 5 MG: 0.83 SOLUTION RESPIRATORY (INHALATION) at 14:02

## 2024-12-25 RX ADMIN — SODIUM CHLORIDE, PRESERVATIVE FREE 10 ML: 5 INJECTION INTRAVENOUS at 20:45

## 2024-12-25 RX ADMIN — MORPHINE SULFATE 2 MG: 2 INJECTION, SOLUTION INTRAMUSCULAR; INTRAVENOUS at 11:22

## 2024-12-25 RX ADMIN — LIDOCAINE HYDROCHLORIDE 15 ML: 20 SOLUTION ORAL at 12:21

## 2024-12-25 RX ADMIN — ENOXAPARIN SODIUM 40 MG: 100 INJECTION SUBCUTANEOUS at 20:45

## 2024-12-25 RX ADMIN — ACETAMINOPHEN 650 MG: 650 SUPPOSITORY RECTAL at 20:17

## 2024-12-25 ASSESSMENT — PAIN SCALES - GENERAL
PAINLEVEL_OUTOF10: 8
PAINLEVEL_OUTOF10: 9

## 2024-12-25 ASSESSMENT — LIFESTYLE VARIABLES
HOW OFTEN DO YOU HAVE A DRINK CONTAINING ALCOHOL: NEVER
HOW MANY STANDARD DRINKS CONTAINING ALCOHOL DO YOU HAVE ON A TYPICAL DAY: PATIENT DOES NOT DRINK

## 2024-12-25 ASSESSMENT — PAIN DESCRIPTION - LOCATION: LOCATION: ABDOMEN

## 2024-12-25 NOTE — ED NOTES
Admitting MD at bedside speaking to pt and daughter regarding admission, and plan of care. Per pt, and daughter, pt would like to be DNR

## 2024-12-25 NOTE — CONSULTS
Clinical Pharmacy Note  Vancomycin Consult    Pharmacy consult received for one-time dose of vancomycin in the Emergency Department per Dr. Sanchez.    Ht Readings from Last 1 Encounters:   02/01/24 1.473 m (4' 10\")        Wt Readings from Last 1 Encounters:   12/25/24 80.2 kg (176 lb 12.9 oz)         Assessment/Plan:  Vancomycin 1250 mg x 1 in ED.  If vancomycin is to continue on admission and pharmacy is to manage dosing, please re-consult with admission orders.      Neel Chávez [facility]  12/25/2024 11:52 AM

## 2024-12-25 NOTE — PROGRESS NOTES
Spiritual Health History and Assessment/Progress Note  [facility]    Crisis, Spiritual/Emotional Needs, Rituals, Rites and Sacraments,  ,  ,      Name: Va Benitez MRN: [id number]    Age: 89 y.o.     Sex: female   Language: English   Buddhist: Confucianist   Partial small bowel obstruction (HCC)     Date: 12/25/2024            Total Time Calculated: 75 min              Spiritual Assessment began in [facility] 4W MED SURG        Referral/Consult From: Nurse (Emergency Department)   Encounter Overview/Reason: Crisis, Spiritual/Emotional Needs, Rituals, Rites and Sacraments  Service Provided For: Patient and family together (Pt's family at bedside.)    Dee, Belief, Meaning:   Patient is connected with a dee tradition or spiritual practice.  Patient identifies as Confucianist.   Family/Friends Other: Family at bedside emphasized patient's dee tradition as Confucianist but, did not disclose their personal affiliation(s), if any, at this time.        Importance and Influence:  Patient has spiritual/personal beliefs that influence decisions regarding their health. Patient receptive to prayer.  The medical team was actively providing care and patient's ability to engage in conversation about this topic were limited.  During a quiet moment, prayer and scripture were shared with patient and family.    Family/Friends Other: Family spoke of patient's dee and its importance to her.  Much reflection was shared about patient's life history in relationship to her dee, resilience, and strength.    Community:  Patient Per family, patient is a resident at [facility] and participates in [employer] regularly once a week as it is offered.  Family/Friends Other: No information provided by family about their personal networks of support beyond other family members.  Primary focus of conversation(s) with family focused on patient.    Assessment and Plan of Care:     Patient Interventions include: Other: Prayed with patient and shared

## 2024-12-25 NOTE — PROGRESS NOTES
Medication Reconciliation    List of medications patient is currently taking is complete.     Source of information: 1. Admission Report from [facility]                                       2. EPIC records      Allergies  Caduet [amlodipine-atorvastatin], Conjugated estrogens, Estrogens conjugated, Procaine, Penicillins, and Terramycin [oxytetracycline-lidocaine]     Notes regarding home medications:     1. Patient received all of her morning home medications at [facility] prior to arrival to the emergency department.      Jenny Seals, Pharmacy Intern  12/25/2024 2:13 PM

## 2024-12-25 NOTE — ED PROVIDER NOTES
Leukocytosis of 15 with a left shift, mild anemia at 11.7.  No aberrancies of sodium or potassium.  Large amount of leukocyte esterase.  CT imaging as above showing concerns for a partial small bowel obstruction.  I spoke to Dr. Osorio with surgery.  The patient's family members are involved in healthcare and have stated that they do not wish for her to have any emergent surgery and would also like for her to be a DNR.  Patient met sepsis criteria and was given sepsis bolus fluids as well as broad-spectrum antibiotics.  NG tube was placed that returned approximately a liter of gastric contents.  Following NG tube placement the patient was subsequently admitted to the inpatient service for further medical management.    Consults (none if blank):   General surgery Dr. Osorio      Social Determinants : None      Vitals Reviewed:    Vitals:    12/25/24 1330 12/25/24 1345 12/25/24 1400 12/25/24 1403   BP: (!) 92/49 (!) 103/50 (!) 93/49    Pulse: 98 97 88 87   Resp: (!) 35 (!) 33 (!) 31 (!) 31   Temp:       TempSrc:       SpO2: (!) 89% (!) 88% (!) 89% 91%   Weight:           The patient was seen and examined.The results of pertinent diagnostic studies and exam findings were discussed. The patient’s provisional diagnosis and plan of care were discussed with the patient (parents) who expressed a complete understanding. Any medications were reviewed and indications and risks of medications were discussed with the patient (parents).    Strict verbal and written return precautions, instructions and recommendation for appropriate follow-up were provided as well.    ED Medications administered this visit:  (None if blank)  Medications   ceFEPIme (MAXIPIME) 2,000 mg in sodium chloride 0.9 % 100 mL IVPB (mini-bag) (2,000 mg IntraVENous New Bag 12/25/24 1225)   vancomycin (VANCOCIN) 1250 mg in 250 mL IVPB (1,250 mg IntraVENous New Bag 12/25/24 1338)   iopamidol (ISOVUE-370) 76 % injection 75 mL (75 mLs IntraVENous Given 12/25/24  1105)   morphine (PF) injection 2 mg (2 mg IntraVENous Given 12/25/24 1122)   0.9 % sodium chloride infusion (30 mL/kg/hr × 80.2 kg IntraVENous New Bag 12/25/24 1337)   lidocaine viscous hcl (XYLOCAINE) 2 % solution 15 mL (15 mLs Nasal Given 12/25/24 1221)   albuterol (PROVENTIL) (2.5 MG/3ML) 0.083% nebulizer solution 5 mg (5 mg Nebulization Given 12/25/24 1402)         Responses to treatment:       PROCEDURES: (None if blank)      CRITICAL CARE: (None if blank)  I personally saw the patient and independently provided 60 minutes of nonconcurrent critical care out of the total shared critical care time provided     This includes multiple reevaluations, vital sign monitoring, pulse oximetry monitoring, telemetry monitoring, clinical response to the IV medications, reviewing the nursing notes, consultation time, dictation/documentation time, and interpretation of the labwork. (This time excludes time spent performing procedures).       DISCHARGE PRESCRIPTIONS: (None if blank)  New Prescriptions    No medications on file         I am the primary clinician of record.     FINAL IMPRESSION      1. Partial small bowel obstruction (HCC)    2. Septicemia (HCC)    3. Acute UTI            DISPOSITION/PLAN   DISPOSITION Admitted 12/25/2024 01:27:10 PM               OUTPATIENT FOLLOW UP THE PATIENT:  No follow-up provider specified.      Electronically Signed: Tyler Sanchez DO, 12/25/24, 2:14 PM    This report has been produced using speech recognition software and may contain errors related to that system including errors in grammar, punctuation, and spelling, as well as words and phrases that may be inappropriate. If there are any questions or concerns please feel free to contact the dictating provider for clarification.       Tyler Sanchez DO  12/25/24 5420

## 2024-12-25 NOTE — PROGRESS NOTES
Pt transferred to room 5266. Report given to RAHEEM Wooten. All questions answered. Family updated. No needs at this time.    Electronically signed by Shanda Lowe RN on 12/25/2024 at 4:38 PM

## 2024-12-25 NOTE — ED NOTES
Cultures obtained x1 from pt left hand. First stick was going to be attempted on right hand, but pt's daughter stated that she had recent surgery on that hand and did not want blood drawn from that hand. Attempted right forearm, but no blood flow after flash.

## 2024-12-25 NOTE — ED NOTES
Pt arrived to the ED via [facility], Pt states that \" belly started swelling last night when I went to bed\" pt has history of bowel obstruction pt has a distended abdomin upon arrival to the ed, vss afebrile, pt is alert and orient

## 2024-12-25 NOTE — PROGRESS NOTES
Per ordering physician, Tyler Sanchez, proceed with contrast administration for cat scan prior to lab results due to possible bowel obstruction.

## 2024-12-25 NOTE — ED NOTES
ED SBAR report provider to RAHEEM Land. Patient to be transported to Room 4122 via stretcher by RN.Patient transported with bedside cardiac monitor and with IV medications infusing. IV site clean, dry, and intact. MEWS score and pain assessed as 2/10 and documented. Patient's score on the ARIAS Fall scale reviewed with receiving RN. Updated patient and family on plan of care.

## 2024-12-25 NOTE — PROGRESS NOTES
Patient admitted to room 5266 with family at bedside. NGT hooked up to suction, superpubic cath in place, gown changed, brief changed, patient on bipap.   Vitals stable. Wounds assessed (see avatar)

## 2024-12-25 NOTE — PROGRESS NOTES
Pt arrived to room 4122, O2 88% with increased WOB, non-rebreather mask and high flow. Respiratory called. Dr. Gilbert notified. Pt now on bipap. Ativan ordered, waiting for pharmacy approval.    Electronically signed by Shanda Lowe RN on 12/25/2024 at 3:36 PM

## 2024-12-25 NOTE — H&P
Hospital Medicine  History and Physical    PCP: Mauricio Covarrubias MD  Patient Name: Va Benitez    Information for this report comes from multiple sources including the emergency room providers, the patient (when able to provide information), and from family/friends when at bedside     Date of Service: Pt seen/examined on 12/25/24 and admitted to Inpatient with expected LOS greater than two midnights due to medical therapy    CHIEF COMPLAINT:  Pt Pt states that \" belly started swelling last night when I went to bed\"   HISTORY OF PRESENT ILLNESS: Pt is an 89 y.o. year-old female who has a past medical history of Acute embolism and thrombosis of right femoral vein (HCC), Acute pneumonia, Anemia, unspecified, Anxiety, Arthritis, Bowel obstruction (HCC), Chronic hypoxemic respiratory failure, Constipation, unspecified, Contraindication to anticoagulation therapy, COVID-19, Diaphragmatic hernia without obstruction or gangrene, Difficulty in walking, not elsewhere classified, Dysarthria and anarthria, Dysphagia, oropharyngeal phase, Excessive daytime sleepiness, Femoral vein thrombosis (HCC), Hiatal hernia, High blood pressure, History of pulmonary embolism, Major depressive disorder, single episode, unspecified, Malignant neoplasm of bladder, unspecified (HCC), Muscle weakness (generalized), Neurogenic bladder, Neuromuscular dysfunction of bladder, unspecified, STAR (obstructive sleep apnea), Osteoporosis, Other artificial openings of urinary tract status (HCC), Other lack of coordination, Other partial intestinal obstruction (HCC), Other pulmonary embolism without acute cor pulmonale (HCC), Other speech disturbances, Parkinson disease (HCC), Pulmonary embolism (HCC), Pulmonary embolism without acute cor pulmonale (HCC), Reactive airway disease, Repeated falls, Restrictive lung disease, Small bowel obstruction (HCC), Suprapubic catheter (HCC), Unspecified lack of coordination, Unspecified voice and resonance  superior endplate of T12.     1. Multiple dilated loops of small bowel favoring a partial small bowel obstruction rather than ileus.         Assessment:   Principal Problem:    Partial small bowel obstruction (HCC)  Active Problems:    Morbid obesity due to excess calories    Parkinson disease (HCC)  Resolved Problems:    * No resolved hospital problems. *      Plan:       Partial small bowel obstruction (HCC)  - Per Surgery, an NG tube was placed  - Keep npo  - Family and patient want only conservative treatment. No surgical intervention  - Surgery consult is appreciated    Acute cystitis with hematuria - patient was started on Cefepime empirically. Urine culture and sensitivities have been ordered, and antibiotic therapy will be adjusted as necessary based upon those results.     Sepsis (HCC) due to UTI with tachycardia, tachypnea, neutrophilic leukocytosis,. Initial Lactic Acid was elevated.   - Pt will be resuscitated with 30 cc/Kg IV Fluids and blood pressure will be monitored closely  - We will cycle serial lactic acid levels until lactic acid has normalized  - Blood cultures x 2 have been ordered    Parkinson disease (HCC)  - Hold meds while npo    Morbid obesity due to excess calories (Body mass index is 36.95 kg/m².) - Complicating assessment and treatment. Placing patient at risk for multiple co-morbidities as well as early death and contributing to the patient's presentation.     Code status - I confirmed with patient and her daughter Limited Code \"No\" x 4.  Daughters also requesting Palliative Care consult, explore hospice options           Code Status: Limited  Diet: Npo  DVT Prophylaxis: Lovenox  Low intensity Enoxaparin Heparin SCDs Coumadin continued Xarelto Eliquis Not indicated, as patient is ambulatory    (Advanced care planning has been discussed with patient and/or responsible family member and is reflected in the code status. Further orders associated with this have been entered if

## 2024-12-26 ENCOUNTER — APPOINTMENT (OUTPATIENT)
Dept: GENERAL RADIOLOGY | Age: 89
DRG: 871 | End: 2024-12-26
Payer: COMMERCIAL

## 2024-12-26 VITALS
HEART RATE: 88 BPM | SYSTOLIC BLOOD PRESSURE: 93 MMHG | DIASTOLIC BLOOD PRESSURE: 50 MMHG | OXYGEN SATURATION: 89 % | BODY MASS INDEX: 35.4 KG/M2 | WEIGHT: 168.65 LBS | TEMPERATURE: 98 F | RESPIRATION RATE: 28 BRPM | HEIGHT: 58 IN

## 2024-12-26 PROBLEM — N39.0 ACUTE UTI: Status: ACTIVE | Noted: 2024-12-26

## 2024-12-26 LAB
ANION GAP SERPL CALCULATED.3IONS-SCNC: 17 MMOL/L (ref 3–16)
BACTERIA UR CULT: NORMAL
BASOPHILS # BLD: 0 K/UL (ref 0–0.2)
BASOPHILS NFR BLD: 0 %
BUN SERPL-MCNC: 39 MG/DL (ref 7–20)
CALCIUM SERPL-MCNC: 8.1 MG/DL (ref 8.3–10.6)
CHLORIDE SERPL-SCNC: 104 MMOL/L (ref 99–110)
CO2 SERPL-SCNC: 24 MMOL/L (ref 21–32)
CREAT SERPL-MCNC: 1.9 MG/DL (ref 0.6–1.2)
DEPRECATED RDW RBC AUTO: 15.6 % (ref 12.4–15.4)
EKG ATRIAL RATE: 98 BPM
EKG DIAGNOSIS: NORMAL
EKG P AXIS: 37 DEGREES
EKG P-R INTERVAL: 160 MS
EKG Q-T INTERVAL: 376 MS
EKG QRS DURATION: 74 MS
EKG QTC CALCULATION (BAZETT): 480 MS
EKG R AXIS: -33 DEGREES
EKG T AXIS: 34 DEGREES
EKG VENTRICULAR RATE: 98 BPM
EOSINOPHIL # BLD: 0 K/UL (ref 0–0.6)
EOSINOPHIL NFR BLD: 0 %
GFR SERPLBLD CREATININE-BSD FMLA CKD-EPI: 25 ML/MIN/{1.73_M2}
GLUCOSE SERPL-MCNC: 152 MG/DL (ref 70–99)
HCT VFR BLD AUTO: 27.9 % (ref 36–48)
HGB BLD-MCNC: 9 G/DL (ref 12–16)
HYPOCHROMIA BLD QL SMEAR: ABNORMAL
LYMPHOCYTES # BLD: 1.2 K/UL (ref 1–5.1)
LYMPHOCYTES NFR BLD: 14 %
MCH RBC QN AUTO: 30.5 PG (ref 26–34)
MCHC RBC AUTO-ENTMCNC: 32.5 G/DL (ref 31–36)
MCV RBC AUTO: 94 FL (ref 80–100)
METAMYELOCYTES NFR BLD MANUAL: 3 %
MICROCYTES BLD QL SMEAR: ABNORMAL
MONOCYTES # BLD: 0.5 K/UL (ref 0–1.3)
MONOCYTES NFR BLD: 6 %
NEUTROPHILS # BLD: 7 K/UL (ref 1.7–7.7)
NEUTROPHILS NFR BLD: 18 %
NEUTS BAND NFR BLD MANUAL: 59 % (ref 0–7)
NT-PROBNP SERPL-MCNC: 1034 PG/ML (ref 0–449)
PLATELET # BLD AUTO: 217 K/UL (ref 135–450)
PLATELET BLD QL SMEAR: ADEQUATE
PMV BLD AUTO: 7.6 FL (ref 5–10.5)
POTASSIUM SERPL-SCNC: 3.9 MMOL/L (ref 3.5–5.1)
RBC # BLD AUTO: 2.96 M/UL (ref 4–5.2)
REASON FOR REJECTION: NORMAL
REJECTED TEST: NORMAL
SLIDE REVIEW: ABNORMAL
SODIUM SERPL-SCNC: 145 MMOL/L (ref 136–145)
WBC # BLD AUTO: 8.7 K/UL (ref 4–11)

## 2024-12-26 PROCEDURE — 1200000000 HC SEMI PRIVATE

## 2024-12-26 PROCEDURE — 94660 CPAP INITIATION&MGMT: CPT

## 2024-12-26 PROCEDURE — 71045 X-RAY EXAM CHEST 1 VIEW: CPT

## 2024-12-26 PROCEDURE — 6360000002 HC RX W HCPCS: Performed by: INTERNAL MEDICINE

## 2024-12-26 PROCEDURE — 6360000002 HC RX W HCPCS: Performed by: NURSE PRACTITIONER

## 2024-12-26 PROCEDURE — 36415 COLL VENOUS BLD VENIPUNCTURE: CPT

## 2024-12-26 PROCEDURE — 2580000003 HC RX 258: Performed by: INTERNAL MEDICINE

## 2024-12-26 PROCEDURE — 2700000000 HC OXYGEN THERAPY PER DAY

## 2024-12-26 PROCEDURE — P9047 ALBUMIN (HUMAN), 25%, 50ML: HCPCS | Performed by: INTERNAL MEDICINE

## 2024-12-26 PROCEDURE — 99291 CRITICAL CARE FIRST HOUR: CPT | Performed by: INTERNAL MEDICINE

## 2024-12-26 PROCEDURE — 85025 COMPLETE CBC W/AUTO DIFF WBC: CPT

## 2024-12-26 PROCEDURE — 93010 ELECTROCARDIOGRAM REPORT: CPT | Performed by: INTERNAL MEDICINE

## 2024-12-26 PROCEDURE — 74018 RADEX ABDOMEN 1 VIEW: CPT

## 2024-12-26 PROCEDURE — 80048 BASIC METABOLIC PNL TOTAL CA: CPT

## 2024-12-26 PROCEDURE — 94761 N-INVAS EAR/PLS OXIMETRY MLT: CPT

## 2024-12-26 PROCEDURE — 83880 ASSAY OF NATRIURETIC PEPTIDE: CPT

## 2024-12-26 PROCEDURE — 99222 1ST HOSP IP/OBS MODERATE 55: CPT | Performed by: SURGERY

## 2024-12-26 PROCEDURE — 2500000003 HC RX 250 WO HCPCS: Performed by: INTERNAL MEDICINE

## 2024-12-26 PROCEDURE — 2580000003 HC RX 258: Performed by: REGISTERED NURSE

## 2024-12-26 RX ORDER — MORPHINE SULFATE 2 MG/ML
2 INJECTION, SOLUTION INTRAMUSCULAR; INTRAVENOUS
Status: DISCONTINUED | OUTPATIENT
Start: 2024-12-26 | End: 2024-12-27 | Stop reason: HOSPADM

## 2024-12-26 RX ORDER — ENOXAPARIN SODIUM 100 MG/ML
30 INJECTION SUBCUTANEOUS NIGHTLY
Status: DISCONTINUED | OUTPATIENT
Start: 2024-12-26 | End: 2024-12-27 | Stop reason: HOSPADM

## 2024-12-26 RX ORDER — ALBUMIN (HUMAN) 12.5 G/50ML
25 SOLUTION INTRAVENOUS ONCE
Status: COMPLETED | OUTPATIENT
Start: 2024-12-26 | End: 2024-12-26

## 2024-12-26 RX ORDER — SODIUM CHLORIDE 9 MG/ML
INJECTION, SOLUTION INTRAVENOUS CONTINUOUS
Status: DISCONTINUED | OUTPATIENT
Start: 2024-12-26 | End: 2024-12-26

## 2024-12-26 RX ORDER — LORAZEPAM 2 MG/ML
0.5 INJECTION INTRAMUSCULAR EVERY 6 HOURS PRN
Status: DISCONTINUED | OUTPATIENT
Start: 2024-12-26 | End: 2024-12-26 | Stop reason: ALTCHOICE

## 2024-12-26 RX ORDER — LORAZEPAM 2 MG/ML
1 INJECTION INTRAMUSCULAR
Status: DISCONTINUED | OUTPATIENT
Start: 2024-12-26 | End: 2024-12-27 | Stop reason: HOSPADM

## 2024-12-26 RX ORDER — 0.9 % SODIUM CHLORIDE 0.9 %
500 INTRAVENOUS SOLUTION INTRAVENOUS ONCE
Status: COMPLETED | OUTPATIENT
Start: 2024-12-26 | End: 2024-12-26

## 2024-12-26 RX ADMIN — MORPHINE SULFATE 2 MG: 2 INJECTION, SOLUTION INTRAMUSCULAR; INTRAVENOUS at 18:11

## 2024-12-26 RX ADMIN — MORPHINE SULFATE 2 MG: 2 INJECTION, SOLUTION INTRAMUSCULAR; INTRAVENOUS at 20:09

## 2024-12-26 RX ADMIN — CEFEPIME 1000 MG: 1 INJECTION, POWDER, FOR SOLUTION INTRAMUSCULAR; INTRAVENOUS at 12:31

## 2024-12-26 RX ADMIN — ALBUMIN (HUMAN) 25 G: 0.25 INJECTION, SOLUTION INTRAVENOUS at 07:57

## 2024-12-26 RX ADMIN — MORPHINE SULFATE 2 MG: 2 INJECTION, SOLUTION INTRAMUSCULAR; INTRAVENOUS at 19:33

## 2024-12-26 RX ADMIN — LORAZEPAM 1 MG: 2 INJECTION INTRAMUSCULAR; INTRAVENOUS at 20:05

## 2024-12-26 RX ADMIN — SODIUM CHLORIDE: 9 INJECTION, SOLUTION INTRAVENOUS at 07:32

## 2024-12-26 RX ADMIN — Medication 10 ML: at 01:25

## 2024-12-26 RX ADMIN — SODIUM CHLORIDE, PRESERVATIVE FREE 10 ML: 5 INJECTION INTRAVENOUS at 20:05

## 2024-12-26 RX ADMIN — LORAZEPAM 0.5 MG: 2 INJECTION INTRAMUSCULAR; INTRAVENOUS at 14:30

## 2024-12-26 RX ADMIN — MORPHINE SULFATE 2 MG: 2 INJECTION, SOLUTION INTRAMUSCULAR; INTRAVENOUS at 18:46

## 2024-12-26 RX ADMIN — MORPHINE SULFATE 2 MG: 2 INJECTION, SOLUTION INTRAMUSCULAR; INTRAVENOUS at 17:46

## 2024-12-26 RX ADMIN — SODIUM CHLORIDE 500 ML: 9 INJECTION, SOLUTION INTRAVENOUS at 01:26

## 2024-12-26 RX ADMIN — LORAZEPAM 1 MG: 2 INJECTION INTRAMUSCULAR; INTRAVENOUS at 17:59

## 2024-12-26 ASSESSMENT — ENCOUNTER SYMPTOMS: SHORTNESS OF BREATH: 0

## 2024-12-26 NOTE — PROGRESS NOTES
Hospitalist   Progress Note    Patient Name: Va Benitez  PCP: Mauricio Covarrubias MD  Date of Admission: 12/25/2024    Chief Complaint on Admission: Pt states that \" belly started swelling last night when I went to bed\"   Chief diagnosis after evaluation: Partial small bowel obstruction, Acute cystitis with hematuria     Brief Synopsis: Patient is a 89 y.o. woman who has a past medical history of Acute embolism and thrombosis of right femoral vein (HCC), Acute pneumonia, Anemia, unspecified, Anxiety, Arthritis, Bowel obstruction (HCC), Chronic hypoxemic respiratory failure, Constipation, unspecified, Contraindication to anticoagulation therapy, COVID-19, Diaphragmatic hernia without obstruction or gangrene, Difficulty in walking, not elsewhere classified, Dysarthria and anarthria, Dysphagia, oropharyngeal phase, Excessive daytime sleepiness, Femoral vein thrombosis (HCC), Hiatal hernia, High blood pressure, History of pulmonary embolism, Major depressive disorder, single episode, unspecified, Malignant neoplasm of bladder, unspecified (HCC), Muscle weakness (generalized), Neurogenic bladder, Neuromuscular dysfunction of bladder, unspecified, STAR (obstructive sleep apnea), Osteoporosis, Other artificial openings of urinary tract status (HCC), Other lack of coordination, Other partial intestinal obstruction (HCC), Other pulmonary embolism without acute cor pulmonale (HCC), Other speech disturbances, Parkinson disease (HCC), Pulmonary embolism (HCC), Pulmonary embolism without acute cor pulmonale (HCC), Reactive airway disease, Repeated falls, Restrictive lung disease, Small bowel obstruction (HCC), Suprapubic catheter (HCC), Unspecified lack of coordination, Unspecified voice and resonance disorder, and Unsteadiness on feet. who was admitted on 12/25/2024 for evaluation and treatment of a partial small bowel obstruction and acute cystitis with hematuria     Pt Seen/Examined and Chart Reviewed.     Subjective: Pt  Summary (Last 24 hours) at 12/26/2024 1710  Last data filed at 12/26/2024 0400  Gross per 24 hour   Intake 20 ml   Output 450 ml   Net -430 ml       Consults:  IP CONSULT TO GENERAL SURGERY  PHARMACY TO DOSE VANCOMYCIN  IP CONSULT TO PALLIATIVE CARE  IP CONSULT TO GENERAL SURGERY  IP CONSULT TO PULMONOLOGY  IP CONSULT TO HOSPICE      Principal Problem:    Partial small bowel obstruction (HCC)  Active Problems:    Morbid obesity due to excess calories    Parkinson disease (HCC)  Resolved Problems:    * No resolved hospital problems. *      ASSESSMENT AND PLAN:    Partial small bowel obstruction (HCC)  - Per Surgery, an NG tube was placed while she was in the ER  - Keep npo  - Family and patient want only conservative treatment. No surgical intervention  - Surgery consult is appreciated     Acute cystitis with hematuria - patient was started on Cefepime empirically. Urine culture and sensitivities have been ordered, and antibiotic therapy will be adjusted as necessary based upon those results.      Sepsis (HCC) with septic shock due to UTI with tachycardia, tachypnea, neutrophilic leukocytosis,. Initial Lactic Acid was not elevated.   - Pt was resuscitated with 30 cc/Kg IV Fluids and blood pressure is being monitored closely  - IV fluids for hypotension. No vasopressors per family request  - Blood cultures x 2 NGTD    Acute respiratory failure with hypoxia - as evidenced by an O2 saturation of less than 92% on room air   - Due to sepsis  - At baseline patient does not require supplemental oxygen  - We will provide oxygen as necessary to maintain an oxygen saturation of 92% or higher on room air       Parkinson disease (HCC)  - Hold meds while npo     Morbid obesity due to excess calories (Body mass index is 36.95 kg/m².) - Complicating assessment and treatment. Placing patient at risk for multiple co-morbidities as well as early death and contributing to the patient's presentation.      Code status - I confirmed with

## 2024-12-26 NOTE — PROGRESS NOTES
Per radiology note from [facility] this AM, NGT needed to be advanced 10 cm. this RN advanced NGT approx 10 cm and resecued with new tape.     made Dr. Osorio aware.

## 2024-12-26 NOTE — CONSULTS
General Surgery Consult Note      Va Benitez   : 1935 MRN: [id number]  Date of Admission: 2024  Admitting Physician:Rafiq Gilbert MD  Primary Care Physician: Mauricio Covarrubias MD    Chief Complaint   Patient presents with    Abdominal Pain     Pt states that \" belly started swelling last night when I went to bed\" pt has history of bowel obstruction        Reason for Consult: Small bowel obstruction versus ileus    Diagnosis Present on Admission:   Partial small bowel obstruction (HCC)   Morbid obesity due to excess calories   Parkinson disease (HCC)      History of Present Illness  Va Benitez is a 89 y.o. female with multiple significant medical comorbidities and history of bowel obstruction admitted on 2024 for abdominal pain, nausea, and bloating.  She was found emergency department to have a small bowel obstruction versus ileus.  She does have a chronic indwelling suprapubic catheter.  Overnight she did have some hypotension and has had worsening respiratory status requiring BiPAP.  Per the patient's family at bedside, they do not want intubation or any surgery.  Patient was currently sleeping while wearing BiPAP during my visit earlier today.    Past Medical History:   Diagnosis Date    Acute embolism and thrombosis of right femoral vein (HCC)     Acute pneumonia     2024    Anemia, unspecified     Anxiety     Arthritis     Bowel obstruction (HCC)     Chronic hypoxemic respiratory failure 2022    Constipation, unspecified     Contraindication to anticoagulation therapy     fall risk    COVID-19     Diaphragmatic hernia without obstruction or gangrene     Difficulty in walking, not elsewhere classified     Dysarthria and anarthria     Dysphagia, oropharyngeal phase     Excessive daytime sleepiness 2023    Femoral vein thrombosis (HCC) 2021    Hiatal hernia     High blood pressure     History of pulmonary embolism 2021    Major depressive disorder,  by mouth Daily with lunch   Yes Brayden Werner MD   polyethylene glycol (GLYCOLAX) 17 g packet Take 1 packet by mouth daily   Yes Brayden Werner MD   carboxymethylcellulose 1 % ophthalmic solution Place 2 drops into both eyes 2 times daily   Yes Brayden Werner MD   albuterol sulfate HFA (VENTOLIN HFA) 108 (90 Base) MCG/ACT inhaler Inhale 2 puffs into the lungs 4 times daily as needed for Wheezing or Shortness of Breath 3/16/22   Priyank Carver MD       Review of Systems  Pertinent positives and negatives are in HPI, otherwise all systems reviewed and negative    Physical Exam  Vitals:    12/26/24 0755   BP:    Pulse: 79   Resp: 20   Temp:    SpO2: 100%         Intake/Output Summary (Last 24 hours) at 12/26/2024 1019  Last data filed at 12/26/2024 0400  Gross per 24 hour   Intake 20 ml   Output 1350 ml   Net -1330 ml       Body mass index is 35.25 kg/m².     General/Appearance: No acute distress, wearing BiPAP  HEENT: PERRLA, Conjunctiva non injected, no scleral icterus.  Mucous membranes pink and moist.  Neck is symmetrical. Trachea appears midline.  Lung: Equal chest rise, no accessory muscle use  Cardiac: Regular rate and rhythm  Abdomen: Soft, minimally distended, minimal diffuse tenderness, no peritonitis  Neuro: No gross motor or sensory deficits.  Skin: No open wounds or rashes.    Labs  Recent Labs     12/25/24  1052 12/26/24  0716   WBC 15.0* 8.7   HGB 11.7* 9.0*   HCT 35.5* 27.9*    217     Recent Labs     12/25/24  1052 12/26/24  0448    145   K 4.2 3.9   CL 96* 104   CO2 30 24   BUN 23* 39*     Recent Labs     12/25/24  1052   AST 18   ALT <5*     Invalid input(s): \"UAPR\", \"UCOL\", \"UNAR\", \"UUNR\", \"UCRR\", \"UCLR\", \"UKR\", \"UUAR\", \"UOSM\", \"EOS\"    Imaging  XR CHEST PORTABLE   Final Result   1. Enteric tube appears appropriately positioned.   2. Slight increase in bilateral airspace opacities.         XR CHEST PORTABLE   Final Result   Enteric tube side port in the mid

## 2024-12-26 NOTE — PROGRESS NOTES
Patient's BP has been soft 86/52 MAP of 62.She had fever too 100.4.Earlier the beginning of the shift it was 101.5.She got Tylenol Suppository.She has been sleepy and drawsy.On BIPAP FIO2 80% and she maintains SPO2 94,HR 75,RR 30,tep 100.4.requested NP,Melia Gates to come and see the patient.    Temp 99.4.    NP,Melia Gates at the bedside.As patient's albumin is  4 patient had only a bolus of  ml NS..    A secure message sent to Chelsea WALLACE regarding patient's BP 78/62 MAP of 68 and to review her home meds.Please see MAR.    Electronically signed by Nohelia Pearson RN on 12/26/24 at 7:45 AM EST

## 2024-12-26 NOTE — PROGRESS NOTES
Clinical Pharmacy Note  Renal Dose Adjustment    Va Benitez is receiving cefepime.  This medication is renally eliminated.  Based on the patient's Estimated Creatinine Clearance: 19 mL/min (A) (based on SCr of 1.9 mg/dL (H)). and urine output, the dose has been adjusted to cefepime 1 gm every 24 hours per protocol.    Pharmacy will continue to monitor and adjust dose as needed for changes in renal function.    Mirta Giron [facility], PharmD, 12/26/2024 7:55 AM

## 2024-12-26 NOTE — CARE COORDINATION
RAHEEM DEVINE phoned Ivet MACIEL from [facility] and she is aware of the referral for the patient. RN CM will continue to follow.   Electronically signed by Sue Rosario RN on 12/26/2024 at 4:06 PM  [phone]

## 2024-12-26 NOTE — PROGRESS NOTES
To Dr. Gilbert---   patients BP been low this morning just started fluids and albumin, currently 101/46. Very little urine output over night, patient is lethargic hard to wake up, more stiff then usual according to family she hasnt gotten her parkinson medication. can we consult pulmonology and family is requesting to recheck CXR   Also made aware of deterioation index of 72.     --pulmonogy consulted, and stat CXR repeat ordered.

## 2024-12-26 NOTE — PROGRESS NOTES
HOSPICE [facility]     Meeting with family at 1630 per there request today.     Met with multiple family members including DEE, Taylor to discuss hospice philosophy and services. Discussed covered vs non covered equipment, services and medication.Time spent listening to events of illness, answering questions and providing emotional support. Will re evaluate patient in morning to see if she is stable to transfer back to facility vs  yen a bed with hospice at hospital if not stable.        Addendum- Sugar MACIEL stated that family is requesting withdrawal of care after 1 more child arrives in town. Plan is to turn off fluids then remove NG and then turn oxygen down to 2 liters for comfort while giving comfort medications. Patient has told her family goodbye and has stated she is dying multiple times to nurses. Will re evaluate tomorrow if needed.  Thank you for allowing me to be apart of Va's care.    Ivet Villarreal RN   [phone]

## 2024-12-26 NOTE — PROGRESS NOTES
Clinical Pharmacy Note  Subcutaneous Anticoagulant Adjustment     Enoxaparin has been adjusted to 30 mg daily based on [facility] policy.    Recent Labs     12/25/24  1052 12/26/24  0448   CREATININE 1.2 1.9*     Recent Labs     12/25/24  1052   HGB 11.7*   HCT 35.5*        Estimated Creatinine Clearance: 19 mL/min (A) (based on SCr of 1.9 mg/dL (H)).    Pharmacist Review of Appropriate Use and Automatic Dose Adjustment of Subcutaneous Anticoagulants (Adult)    The guidance below is to provide initial recommendations for dosing. If recommended dose does not align well with patient's current clinical picture, communications with the care team will occur to determine most appropriate medication and dose.    TABLE 1.  ENOXAPARIN ROUTINE PROPHYLAXIS DOSING (Medically ill, routine surgery)   Patient Weight (kg)     50.9 and below 51 - 100.9 101 - 150.9 151 - 174.9 175 or greater         Estimated CrCl  (ml/min) 30 or greater   30 mg SUBQ daily   40 mg SUBQ daily 30 mg SUBQ BID  40 mg SUBQ BID 60mg SUBQ BID      15-29 UFH 5000 units SUBQ BID   30 mg SUBQ daily 30 mg SUBQ daily 40 mg SUBQ daily   60 mg SUBQ daily      Less than 15 or Dialysis UFH 5000 units SUBQ BID   UFH 5000 units SUBQ TID UFH 7500 units SUBQ TID         Mirta Giron [facility] 12/26/2024 7:50 AM

## 2024-12-26 NOTE — PLAN OF CARE
Problem: Discharge Planning  Goal: Discharge to home or other facility with appropriate resources  Outcome: Progressing     Problem: Pain  Goal: Verbalizes/displays adequate comfort level or baseline comfort level  Outcome: Progressing  Flowsheets (Taken 12/25/2024 2355 by Nohelia Pearson RN)  Verbalizes/displays adequate comfort level or baseline comfort level:   Encourage patient to monitor pain and request assistance   Assess pain using appropriate pain scale   Administer analgesics based on type and severity of pain and evaluate response   Implement non-pharmacological measures as appropriate and evaluate response   Notify Licensed Independent Practitioner if interventions unsuccessful or patient reports new pain     Problem: Safety - Adult  Goal: Free from fall injury  Outcome: Progressing  Flowsheets (Taken 12/26/2024 0600 by Nohelia Pearson RN)  Free From Fall Injury:   Instruct family/caregiver on patient safety   Based on caregiver fall risk screen, instruct family/caregiver to ask for assistance with transferring infant if caregiver noted to have fall risk factors     Problem: Safety - Adult  Goal: Free from fall injury  Outcome: Progressing  Flowsheets (Taken 12/26/2024 0600 by Nohelia Pearson RN)  Free From Fall Injury:   Instruct family/caregiver on patient safety   Based on caregiver fall risk screen, instruct family/caregiver to ask for assistance with transferring infant if caregiver noted to have fall risk factors     Problem: Skin/Tissue Integrity  Goal: Absence of new skin breakdown  Description: 1.  Monitor for areas of redness and/or skin breakdown  2.  Assess vascular access sites hourly  3.  Every 4-6 hours minimum:  Change oxygen saturation probe site  4.  Every 4-6 hours:  If on nasal continuous positive airway pressure, respiratory therapy assess nares and determine need for appliance change or resting period.  Outcome: Progressing     Problem: ABCDS Injury Assessment  Goal: Absence of physical

## 2024-12-26 NOTE — CONSULTS
PALLIATIVE MEDICINE CONSULTATION     Patient name:Va Benitez   MRN:[id number]    :1935  Room/Bed:H9T-0416/5266-01   LOS: 1 day         Date of consult:2024    Inpatient consult to Palliative Care  Consult performed by: Sara Cole APRN - CNP  Consult ordered by: Rafiq Gilbert MD  Reason for consult: Goals of care.        ASSESSMENT/RECOMMENDATIONS     89 y.o. female with acute on chronic respiratory failure, small bowel obstruction, and UTI.      Symptom Management:  Partial small bowel obstruction -General Surgery following.  NG tube in place, patient NPO.  No plans for surgical intervention per family.  Acute cystitis with hematuria -IV antibiotics per attending.  Sepsis -secondary to UTI.  IV antibiotics and IV fluids per attending.  Acute on chronic respiratory failure with hypoxia -patient on BiPAP, asking for removal.  Placed on high flow oxygen.  Chest x-ray worsening this morning, pulmonology met with family at bedside.  Discussed risk versus benefit of BiPAP.  Confirmed patient would never wish to pursue intubation.  Possible continuation of aggressive measures versus comfort care.   Pulmonary congestion -pneumonia versus aspiration versus heart failure.  Chest x-ray worsening from yesterday.  BNP not elevated.  Discussed with family at bedside.  Goals of Care - See below.    Patient/Family Goals of Care :    Goals of care conversation with the patient, her daughter (HCPLATESHA), and sister at bedside.  Patient does have a signed DNR comfort care form on file.  Her desire is to never be intubated, only continue with conservative measures.  Patient requesting to come off of BiPAP, placed on high flow cannula at 10 L.  Ultimately, family goal is to allow patient time with her out-of-town family members who are currently en route and will be here this afternoon.  Pending patient's status at that time, will continue to pursue IV antibiotics and conservative measures  for small bowel obstruction versus comfort measures.    Disposition/Discharge Plan:   Pending.  Patient is from a long-term care facility, family may be interested in hospice.    Advance Directives:    The patient has appointed the following active healthcare agents:    Primary Decision Maker: Taylor Pitts - Child - [phone]    The Patient has the following current code status:    Code Status: DNR-CC    Interactive exchange regarding medications,tests and procedures with: patient, floor RN, DEE, Dr. Carver.   Thank you for allowing us to participate in the care of this patient.      HISTORY     CC: abdominal swelling.   HPI: The patient is a 89 y.o. female with a past medical history significant for anemia, anxiety, arthritis, DVT/PE, recurrent bowel obstructions, chronic respiratory failure with hypoxia, dysphagia, sleep apnea, hiatal hernia, hypertension, hyperlipidemia, depression, malignant neoplasm of the bladder, neurogenic bladder, restrictive lung disease, and Parkinson's disease who presented to the ED secondary to abdominal pain and bloating.  Per family, patient was able to eat brunch the day prior, had a small bowel movement that evening.  Began having abdominal discomfort and was brought to the emergency room for further evaluation given history of bowel obstruction.  Workup revealed patient did indeed have a partial small bowel obstruction, NG tube was placed for conservative measures.  No plans for surgery per patient's request.  Patient has been declining somewhat per family report, has newer pressure ulcers.  Was also found to have a UTI.  Secondary to hyper hypoxia, patient was placed from a nonrebreather to BiPAP.  Has been hypotensive this morning.  Daughter (DEE) and sister at bedside.  Patient appears somewhat lethargic, however able to answer questions once BiPAP removed.    Palliative Medicine SymptomScreening/ROS:    Review of Systems   Constitutional:  Positive for fatigue.    Respiratory:  Negative for shortness of breath.    Cardiovascular:  Negative for chest pain.   Neurological:  Positive for weakness.     A complete 10 count ROS was obtained. Pertinent positives mentioned above in HPI/ROS.  All others if not mentioned are negative.       Palliative Performance Scale:     [] 60%  Amb reduced; Sig dz. Can't do hobbies/housework; Intake normal or reduced, Occasional assist; LOC full/confusion   [] 50%  Mainly sit/lie; Extensive disease. Mainly assist, Intake normal or reduced; Occasional assist; LOC full/confusion   [] 40%  Mainly in bed; Extensive disease; Mainly assist; Intake normal or reduced; Occasional assist; LOC full/confusion   [] 30%  Bed bound, Extensive disease; Total care; Intake reduced; LOC full/confusion   [x] 20%  Bed bound; Extensive disease; Total care; Intake minimal; Drowsy/coma   [] 10%  Bed bound; Extensive disease; Total care; Mouth care only; Drowsy/coma   []  0%   Death       Home med list and hospital medications reviewed in chart as of 12/26/2024     EXAM     Vitals:    12/26/24 0755   BP:    Pulse: 79   Resp: 20   Temp:    SpO2: 100%       Physical Exam  Constitutional:       Appearance: She is ill-appearing.   Cardiovascular:      Rate and Rhythm: Normal rate.   Pulmonary:      Effort: Pulmonary effort is normal.   Abdominal:      General: Bowel sounds are normal.   Neurological:      Mental Status: She is oriented to person, place, and time. She is lethargic.      Motor: Weakness present.          OBJECTIVE   BP (!) 69/36   Pulse 79   Temp 98 °F (36.7 °C) (Axillary)   Resp 20   Ht 1.473 m (4' 10\")   Wt 76.5 kg (168 lb 10.4 oz)   SpO2 100%   BMI 35.25 kg/m²   I/O last 3 completed shifts:  In: 20 [I.V.:20]  Out: 1350 [Urine:250; Emesis/NG output:1100]  No intake/output data recorded.      Palliative Medicine Interventions:    patient/family support  Goals of Care discussions with patient/surrogate  Spiritual Interventions: Patient's clergy was able

## 2024-12-26 NOTE — CARE COORDINATION
12/26/24 7607   Service Assessment   Patient Orientation Alert and Oriented   Cognition Alert   History Provided By Child/Family   Primary Caregiver Other (Comment)  (patient from [facility])   Accompanied By/Relationship daughter Taylor at bedside and sister Antonietta   Support Systems Children;Family Members   Patient's Healthcare Decision Maker is: Legal Next of Kin   PCP Verified by CM Yes  (patient see's facility PCP)   Last Visit to PCP Within last 3 months   Prior Functional Level Assistance with the following:;Bathing;Dressing;Toileting;Cooking;Housework;Shopping;Mobility   Current Functional Level Mobility;Shopping;Cooking;Housework;Toileting;Dressing;Bathing;Assistance with the following:   Can patient return to prior living arrangement Unknown at present   Ability to make needs known: Poor   Family able to assist with home care needs: Other (comment)  (patient is from LT)   Would you like for me to discuss the discharge plan with any other family members/significant others, and if so, who? Yes  (daughter Taylor)   Financial Resources Medicare;Medicaid   Community Resources ECF/Home Care   CM/SW Referral Other (see comment)  (discharge planning)   Discharge Planning   Type of Residence Long-Term Care   Living Arrangements Other (Comment)  ([facility])   Current Services Prior To Admission Durable Medical Equipment   Current DME Prior to Arrival Wheelchair;Walker   Potential Assistance Needed Extended Care Facility   DME Ordered? No   Potential Assistance Purchasing Medications No   Type of Home Care Services None   Patient expects to be discharged to: Long-term care   One/Two Story Residence One story   History of falls? 0   Services At/After Discharge   Transition of Care Consult (CM Consult) N/A   Services At/After Discharge Long term care   [place] Resource Information Provided? No   Mode of Transport at Discharge BLS   Confirm Follow Up Transport Family   Condition of  Michell, 12/26/2024, at 4:04pm who confirmed the patient is: Long Term Care, no Precert required for return.      Patient Covid vaccination status:    Internal Administration   First Dose COVID-19, PFIZER PURPLE top, DILUTE for use, (age 12 y+), 30mcg/0.3mL  01/18/2021   Second Dose COVID-19, PFIZER PURPLE top, DILUTE for use, (age 12 y+), 30mcg/0.3mL   02/08/2021       Last COVID Lab SARS-CoV-2, NAAT (no units)   Date Value   12/12/2022 Not Detected        Financial    Payor: [employer] / Plan: [employer] DUAL / Product Type: *No Product type* /     Does insurance require precert for SNF: Yes    Potential assistance Purchasing Medications: (P) No  Meds-to-Beds request:        [address] -  [phone] - F [phone]  [address] [facility] [address]  Phone: [phone] Fax: [phone]      Notes:    Factors facilitating achievement of predicted outcomes: Family support and Cooperative    Barriers to discharge: none    Additional Case Management Notes: RN CM met with patient, daughter, an sister at bedside. Patient was sleeping. Daughter provided RN CM with patient lives at [facility] and they provide total care for patient. Bedside RAHEEM Wooten stated patient would like to go Hospice. RN CM provided list of hospice agencies to family and they would like a referral to be made to hospice [facility]. RN CM sent referral to hospice [facility].     The Plan for Transition of Care is related to the following treatment goals of Septicemia (HCC) [A41.9]  Acute UTI [N39.0]  Partial small bowel obstruction (HCC) [K56.600]    IF APPLICABLE: The Patient and/or patient representative Va and her family were provided with a choice of provider and agrees with the discharge plan. Freedom of choice list with basic dialogue that supports the patient's individualized plan of care/goals and shares the quality data  associated with the providers was provided to: (P) Patient   Patient Representative Name:       The Patient and/or Patient Representative Agree with the Discharge Plan? (P) Yes    Sue Rosario RN  Case Management Department  Ph: [phone] Fax: [phone]

## 2024-12-26 NOTE — CONSULTS
coordination     Other partial intestinal obstruction (HCC)     Other pulmonary embolism without acute cor pulmonale (HCC)     Other speech disturbances     Parkinson disease (HCC)     Pulmonary embolism (HCC)     Pulmonary embolism without acute cor pulmonale (HCC) 12/13/2020    Reactive airway disease     Repeated falls     Restrictive lung disease 08/03/2022    Small bowel obstruction (HCC) 02/26/2021    Suprapubic catheter (HCC)     Unspecified lack of coordination     Unspecified voice and resonance disorder     Unsteadiness on feet          Past Surgical History:   Procedure Laterality Date    ABDOMEN SURGERY      bowel obstruction    APPENDECTOMY  1948    CYSTOSCOPY N/A 12/03/2020    CYSTOSCOPY,  WITH INTRAVESICAL INJECTION OF BOTOX 200UNITS, WITH SUPRAPUBIC TUBE EXCHANGE performed by Viki Morocho MD at [facility] OR    CYSTOSCOPY N/A 7/17/2023    CYSTOSCOPY WITH INTRAVESICAL INJECTION  UNITS OF BOTOX performed by Viki Morocho MD at [facility] OR    CYSTOSCOPY N/A 2/1/2024    CYSTOSCOPY BLADDER BOTOX INJECTION 200 UNITS AND EXCHANGE OF SUPRAPUBIC CATHETER performed by Viki Morocho MD at [facility] OR    DILATION AND CURETTAGE OF UTERUS  1959 60 62 63    HYSTERECTOMY (CERVIX STATUS UNKNOWN)  1974    IVC FILTER INSERTION Right 12/17/2020    Dr. Weber. [place] retreivable IVC filter    TONSILLECTOMY AND ADENOIDECTOMY  1943    UPPER GASTROINTESTINAL ENDOSCOPY N/A 2/26/2021    EGD BIOPSY performed by Misael Osborn MD at [facility] ENDOSCOPY    VAGINA SURGERY  2006    repair vagina bladder and rectum       Family Hx  family history includes No Known Problems in her father.    Social Hx   reports that she has never smoked. She has never used smokeless tobacco.    Scheduled Meds:   enoxaparin  30 mg SubCUTAneous Nightly    cefepime  1,000 mg IntraVENous Q24H    sodium chloride flush  10 mL IntraVENous 2 times per day       Continuous Infusions:   sodium chloride 100 mL/hr at 12/26/24 0732     reviewed personally by me, interpretation as follows:  -Multifocal airspace disease          Assessment/Plan:       Acute hypoxemic respiratory failure requiring BiPAP  Obstructive sleep apnea  -Should be wearing PAP at night and with naps  -Concerned she could have had an aspiration event with her SBO, cannot rule out a large component of fluid as well.  Lastly could be developing ARDS from sepsis.  -Would diurese as tolerated, already on antibiotic  -Looks like family does not want any resuscitative measures.  Agree with palliative care consult.  We need to clarify if patient is a DNR CCA or truly dnrCC.  They know she would not want a ventilator and daughter is discussing with siblings regarding goals of care should the BiPAP not work which is my concern.      Partial SBO  -NGT LIS, general surgery following  -N.p.o.  -No surgical plans per family request    Urinary tract infection  -On cefepime    Due to the immediate potential for life-threatening deterioration due to respiratory failure requiring NIPPV, I spent 33 minutes providing critical care.  This time is excluding time spent performing procedures.      DO NOT INTUBATE      This note was transcribed using Dragon Dictation software. Please disregard any translational errors.    Thank you for the consult    Priyank Merritt Pulmonary, Sleep and Critical Care Medicine

## 2024-12-26 NOTE — CARE COORDINATION
Wound Referral Progress Note       NAME:  Va Benitez  MEDICAL RECORD NUMBER:  [id number]  AGE: 89 y.o.   GENDER: female  : 1935  TODAY'S DATE:  2024    Subjective   Reason for WOCN Evaluation and Assessment: pt has stage 2, irritant contact dermatitis, chronic tissue discoloration, POA.      Va Benitez is a 89 y.o. female referred by:   Nursing    Wound Identification:  Wound Type: irritant contact dermatitis, chronic tissue discoloration  Contributing Factors: chronic pressure, decreased mobility, shear force, and incontinence of stool    Wound History: noted on admit  Current Wound Care Treatment:  triad    Patient Care Goal:  Palliative Care        PAST MEDICAL HISTORY        Diagnosis Date    Acute embolism and thrombosis of right femoral vein (HCC)     Acute pneumonia     2024    Anemia, unspecified     Anxiety     Arthritis     Bowel obstruction (HCC)     Chronic hypoxemic respiratory failure 2022    Constipation, unspecified     Contraindication to anticoagulation therapy     fall risk    COVID-19     Diaphragmatic hernia without obstruction or gangrene     Difficulty in walking, not elsewhere classified     Dysarthria and anarthria     Dysphagia, oropharyngeal phase     Excessive daytime sleepiness 2023    Femoral vein thrombosis (HCC) 2021    Hiatal hernia     High blood pressure     History of pulmonary embolism 2021    Major depressive disorder, single episode, unspecified     Malignant neoplasm of bladder, unspecified (HCC)     Muscle weakness (generalized)     Neurogenic bladder     Neuromuscular dysfunction of bladder, unspecified     STAR (obstructive sleep apnea) 2022    Osteoporosis     Other artificial openings of urinary tract status (HCC)     Other lack of coordination     Other partial intestinal obstruction (HCC)     Other pulmonary embolism without acute cor pulmonale (HCC)     Other speech disturbances     Parkinson disease (HCC)

## 2024-12-27 NOTE — DEATH NOTES
Death Pronouncement Note  Patient's Name: Va Benitez   Patient's YOB: 1935  MRN Number: [id number]    Admitting Provider: Rafiq Gilbert MD  Attending Provider: Rafiq Gilbert MD    Patient was examined and the following were absent: Pulses, Blood Pressure, and Respiratory effort    I declared the patient dead on 12/26/2024 at 2115.    Preliminary Cause of Death: Acute respiratory failure secondary to septic shock.    Electronically signed by WILMAR Ruiz CNP on 12/26/24 at 9:21 PM EST

## 2024-12-27 NOTE — PROGRESS NOTES
[facility] notified that patient was reading asystole on the heart monitor. Family then notified RN that patient stopped breathing at 20:46. RN assessed patient and removed tele monitor. [place] NP notified and came to bedside. TOD called at 2115. Patient belongings taken by family. [facility] called at 2100 and referral number given. Nurse supervisor notified. Post mortem care completed and documented.  home information documented on post mortem forms ([place]: [phone]). Post mortem forms printed and given to transport. Patient transported to the [facility] at 22:30. Hospice [facility] notified.

## 2024-12-27 NOTE — DISCHARGE SUMMARY
Osteoporosis, Other artificial openings of urinary tract status (HCC), Other lack of coordination, Other partial intestinal obstruction (HCC), Other pulmonary embolism without acute cor pulmonale (HCC), Other speech disturbances, Parkinson disease (HCC), Pulmonary embolism (HCC), Pulmonary embolism without acute cor pulmonale (HCC), Reactive airway disease, Repeated falls, Restrictive lung disease, Small bowel obstruction (HCC), Suprapubic catheter (HCC), Unspecified lack of coordination, Unspecified voice and resonance disorder, and Unsteadiness on feet. who was admitted on 12/25/2024 for evaluation and treatment of a partial small bowel obstruction and acute cystitis with hematuria       Family decided to withdrawal care and patient subsequently passed away. The following was her A/P at the time care was withdrawn    Partial small bowel obstruction (HCC)  - Per Surgery, an NG tube was placed while she was in the ER  - Keep npo  - Family and patient want only conservative treatment. No surgical intervention  - Surgery consult is appreciated     Acute cystitis with hematuria - patient was started on Cefepime empirically. Urine culture and sensitivities have been ordered, and antibiotic therapy will be adjusted as necessary based upon those results.      Sepsis (HCC) with septic shock due to UTI with tachycardia, tachypnea, neutrophilic leukocytosis,. Initial Lactic Acid was not elevated.   - Pt was resuscitated with 30 cc/Kg IV Fluids and blood pressure is being monitored closely  - IV fluids for hypotension. No vasopressors per family request  - Blood cultures x 2 NGTD     Acute respiratory failure with hypoxia - as evidenced by an O2 saturation of less than 92% on room air   - Due to sepsis  - At baseline patient does not require supplemental oxygen  - We will provide oxygen as necessary to maintain an oxygen saturation of 92% or higher on room air       Parkinson disease (HCC)  - Hold meds while npo     Morbid  obesity due to excess calories (Body mass index is 36.95 kg/m².) - Complicating assessment and treatment. Placing patient at risk for multiple co-morbidities as well as early death and contributing to the patient's presentation.      Code status - I confirmed with patient and her daughter Limited Code \"No\" x 4.  Daughters also requesting Palliative Care consult, explore hospice options             Consults: pulmonary/intensive care and general surgery    Imaging Studies:  XR ABDOMEN (KUB) (SINGLE AP VIEW)    Result Date: 12/26/2024  EXAMINATION: ONE SUPINE XRAY VIEW(S) OF THE ABDOMEN 12/26/2024 11:56 am COMPARISON: CT dated 12/25/2024 HISTORY: ORDERING SYSTEM PROVIDED HISTORY: eval SBO vs ileus TECHNOLOGIST PROVIDED HISTORY: Reason for exam:->eval SBO vs ileus Reason for Exam: sbo vs ileus FINDINGS: Interval retraction of the enteric tube with the tube side hole projecting over the mid esophagus and the tube tip projecting over the lower esophagus. Persistent gas-filled dilated loops of bowel project over the abdomen.  No evidence of pneumoperitoneum.  IVC filter projects over the lumbar spine.  No acute osseous abnormality.     Retracted enteric tube with the tube tip projecting over the lower esophagus. Recommend advancement by 10 cm for more optimal positioning.     XR CHEST PORTABLE    Result Date: 12/26/2024  EXAMINATION: ONE XRAY VIEW OF THE CHEST 12/26/2024 8:41 am COMPARISON: 12/25/2024 radiograph HISTORY: ORDERING SYSTEM PROVIDED HISTORY: repeat TECHNOLOGIST PROVIDED HISTORY: Reason for exam:->repeat Reason for Exam: pt on bipap FINDINGS: Enteric tube has been advanced.  Side hole has passed beyond the level of the diaphragm and is likely appropriately positioned.  The heart is enlarged. Scattered perihilar and bibasilar airspace opacities have slightly increased. There are no significant skeletal findings.     1. Enteric tube appears appropriately positioned. 2. Slight increase in bilateral airspace  Additional Contrast?->None Decision Support Exception - unselect if not a suspected or confirmed emergency medical condition->Emergency Medical Condition (MA) Reason for Exam: swelling Relevant Medical/Surgical History: abdomen surgery for bowel obstruction, hysterectomy, appendectomy, vaginal surgery, d/c of uterus, IVC filter placement FINDINGS: Lower Chest: There is bibasilar scarring.  Coronary artery calcifications are a marker of atherosclerosis.  Fluid fills the distended esophagus. Organs: The liver, spleen, pancreas, adrenal glands and kidneys are unremarkable.  Cholelithiases are present in lumen of the gallbladder. GI/Bowel: There are multiple dilated loops of small bowel and stomach without a discrete transition point.  Status post appendectomy. Pelvis: Status post hysterectomy.  A suprapubic Tse catheter decompresses the lumen of the bladder.  No change in the small to moderate right fat containing inguinal hernia. Peritoneum/Retroperitoneum: There is no evidence of free fluid or adenopathy. Moderate atherosclerosis involves the abdominal aorta and bilateral common iliac arteries.  An inferior vena cava filter is insitu.  A small to moderate fat containing supraumbilical hernia is noted. Bones/Soft Tissues: Degenerative changes involve the thoracolumbar spine, bilateral hips and sacroiliac joints.  No change in the old compression fracture involving the superior endplate of T12.     1. Multiple dilated loops of small bowel favoring a partial small bowel obstruction rather than ileus.       Other Significant Diagnostic Studies: As described above    Treatments: As described above    Disposition:  Home    Discharge Medications:  N/A      Signed:  Rafiq Gilbert MD  2024, 2:29 PM

## 2024-12-29 LAB
BACTERIA BLD CULT ORG #2: NORMAL
BACTERIA BLD CULT: NORMAL

## 2025-01-04 NOTE — PROGRESS NOTES
Physician Progress Note      PATIENT:               FRAN HINKLE  [facility] #:                  [id number]  :                       1935  ADMIT DATE:       2024 10:37 AM  DISCH DATE:        2024 10:00 PM  RESPONDING  PROVIDER #:        Rafiq Gilbert MD          QUERY TEXT:    Pt admitted with sepsis with UTI.  Pt noted to have Tse and a suprapubic   catheter.. If possible, please document in the progress notes and discharge   summary if you are evaluating and/or treating any of the following:    The medical record reflects the following:  Risk Factors: Suprapubic catheter.  Clinical Indicators: ED provider note \"Was initially told by EMS that she was   retaining urine, however has a Tse and a suprapubic catheter in place   draining urine\", UA , 1 epithelial cell, large leukocytes, turbid   color, mucus present.. urine cx \" >50,000 CFU/ml Mixed pathogens  Multiple organisms isolated, no predominance. Culture indicates probable   contamination, CBC 15, relative bands 59, T- max 101.4, acute respiratory   failure.  Treatment: IV cefepime, IV Vanc, Bipap, albumin,  Options provided:  -- Sepsis due to UTI secondary to suprapubic catheter with negative urine   culture.  -- Sepsis due to UTI not secondary to suprapubic catheter wiht negative urine   culture.  -- Sepsis due to unknown bacterial infection, UTI was ruled out.  -- Sepsis due to other., Please specify cause.  -- Other - I will add my own diagnosis  -- Disagree - Not applicable / Not valid  -- Disagree - Clinically unable to determine / Unknown  -- Refer to Clinical Documentation Reviewer    PROVIDER RESPONSE TEXT:    Sepsis due to UTI secondary to suprapubic catheter with negative urine   culture.    Query created by: Sugar Leyva on 1/3/2025 3:59 PM      QUERY TEXT:    Patient admitted with Sepsis. Per  wound nurse note, noted to also have   pressure ulcer. If possible, please document in progress notes and discharge   summary

## (undated) DEVICE — STERILE SURGICAL LUBRICANT, METAL TUBE: Brand: SURGILUBE

## (undated) DEVICE — SKIN MARKER REGULAR TIP WITH RULER CAP AND LABELS: Brand: DEVON

## (undated) DEVICE — BAG URO DRN URO-CATCHER

## (undated) DEVICE — SOLUTION IRRIG 1000ML STRL H2O USP PLAS POUR BTL

## (undated) DEVICE — GOWN SIRUS NONREIN XL W/TWL: Brand: MEDLINE INDUSTRIES, INC.

## (undated) DEVICE — CYSTOSCOPY: Brand: MEDLINE INDUSTRIES, INC.

## (undated) DEVICE — SOLUTION IRRIG 3000ML STRL H2O USP UROMATIC PLAS CONT

## (undated) DEVICE — Device

## (undated) DEVICE — NEEDLE CYSTO FLX 23 GAX70 CM ADJ DEPTH PRECIS INJ INJETAK

## (undated) DEVICE — ENDOSCOPY KIT: Brand: MEDLINE INDUSTRIES, INC.

## (undated) DEVICE — SOLUTION SCRB 4OZ 10% POVIDONE IOD ANTIMIC BTL

## (undated) DEVICE — FORCEPS BX 240CM 2.4MM L NDL RAD JAW 4 M00513334

## (undated) DEVICE — CATHETER URETH 12FR L16IN RED RUB INTMIT ROB MOD BARDX

## (undated) DEVICE — MERCY HEALTH WEST TURNOVER: Brand: MEDLINE INDUSTRIES, INC.

## (undated) DEVICE — Z INACTIVE USE 2635503 SOLUTION IRRIG 3000ML ST H2O USP UROMATIC PLAS CONT

## (undated) DEVICE — GLOVE SURG SZ 65 CRM LTX FREE POLYISOPRENE POLYMER BEAD ANTI

## (undated) DEVICE — Z DISCONTINUED BY MEDLINE USE 2711682 TRAY SKIN PREP DRY W/ PREM GLV

## (undated) DEVICE — NEEDLE SPNL L3.5IN PNK HUB S STL REG WALL FIT STYL W/ QNCKE

## (undated) DEVICE — Device: Brand: INJETAK ADJUSTABLE TIP NEEDLE 70CM

## (undated) DEVICE — CONTAINER SPEC 480ML CLR POLYSTYR 10% NEUT BUFF FRMLN ZN

## (undated) DEVICE — SYRINGE MED 10ML TRNSLUC BRL PLUNG BLK MRK POLYPR CTRL

## (undated) DEVICE — CATHETER URETH 14FR L16IN RED RUB INTMIT ROB MOD BARDX

## (undated) DEVICE — SOLUTION IV IRRIG WATER 1000ML POUR BRL 2F7114

## (undated) DEVICE — CYSTO/BLADDER IRRIGATION SET, REGULATING CLAMP

## (undated) DEVICE — PLUG CATH DRN TUBE CVR CAP STRL LF

## (undated) DEVICE — BITE BLOCK ENDOSCP AD 60 FR W/ ADJ STRP PLAS GRN BLOX

## (undated) DEVICE — PACK,CYSTOSCOPY,PK III,AURORA: Brand: MEDLINE

## (undated) DEVICE — CATHETER F BLLN 5CC 16FR 2 W HYDRGEL COAT LESS TRAUM LUB

## (undated) DEVICE — TOWEL,OR,DSP,ST,BLUE,STD,4/PK,20PK/CS: Brand: MEDLINE

## (undated) DEVICE — NEEDLE HYPO 18GA L1.5IN THN WALL PIVOTING SHLD BVL ORIENTED